# Patient Record
Sex: FEMALE | Race: WHITE | NOT HISPANIC OR LATINO | Employment: FULL TIME | URBAN - METROPOLITAN AREA
[De-identification: names, ages, dates, MRNs, and addresses within clinical notes are randomized per-mention and may not be internally consistent; named-entity substitution may affect disease eponyms.]

---

## 2018-02-23 ENCOUNTER — OFFICE VISIT (OUTPATIENT)
Dept: OBSTETRICS AND GYNECOLOGY | Facility: CLINIC | Age: 53
End: 2018-02-23

## 2018-02-23 VITALS — BODY MASS INDEX: 23.18 KG/M2 | WEIGHT: 157 LBS | DIASTOLIC BLOOD PRESSURE: 80 MMHG | SYSTOLIC BLOOD PRESSURE: 110 MMHG

## 2018-02-23 DIAGNOSIS — Z94.9 TRANSPLANT: ICD-10-CM

## 2018-02-23 DIAGNOSIS — Z11.3 SCREENING FOR STD (SEXUALLY TRANSMITTED DISEASE): ICD-10-CM

## 2018-02-23 DIAGNOSIS — C80.1 CANCER (HCC): ICD-10-CM

## 2018-02-23 DIAGNOSIS — Z87.410 HISTORY OF CERVICAL DYSPLASIA: ICD-10-CM

## 2018-02-23 DIAGNOSIS — C73 THYROID CANCER (HCC): ICD-10-CM

## 2018-02-23 DIAGNOSIS — N95.2 VAGINAL ATROPHY: ICD-10-CM

## 2018-02-23 DIAGNOSIS — Z00.00 ANNUAL PHYSICAL EXAM: Primary | ICD-10-CM

## 2018-02-23 PROBLEM — C50.919 BREAST CANCER (HCC): Status: ACTIVE | Noted: 2018-02-23

## 2018-02-23 LAB
BILIRUB BLD-MCNC: NEGATIVE MG/DL
CLARITY, POC: CLEAR
COLOR UR: YELLOW
GLUCOSE UR STRIP-MCNC: NEGATIVE MG/DL
KETONES UR QL: NEGATIVE
LEUKOCYTE EST, POC: NEGATIVE
NITRITE UR-MCNC: NEGATIVE MG/ML
PH UR: 5 [PH] (ref 5–8)
PROT UR STRIP-MCNC: NEGATIVE MG/DL
RBC # UR STRIP: NEGATIVE /UL
SP GR UR: 1 (ref 1–1.03)
UROBILINOGEN UR QL: NORMAL

## 2018-02-23 PROCEDURE — 99213 OFFICE O/P EST LOW 20 MIN: CPT | Performed by: OBSTETRICS & GYNECOLOGY

## 2018-02-23 PROCEDURE — 81002 URINALYSIS NONAUTO W/O SCOPE: CPT | Performed by: OBSTETRICS & GYNECOLOGY

## 2018-02-23 PROCEDURE — 99386 PREV VISIT NEW AGE 40-64: CPT | Performed by: OBSTETRICS & GYNECOLOGY

## 2018-02-23 RX ORDER — BACLOFEN 10 MG/1
10 TABLET ORAL 3 TIMES DAILY
COMMUNITY
End: 2018-08-16

## 2018-02-23 RX ORDER — LEVOTHYROXINE SODIUM 137 UG/1
112 TABLET ORAL DAILY
COMMUNITY

## 2018-02-23 RX ORDER — SUMATRIPTAN 100 MG/1
100 TABLET, FILM COATED ORAL
COMMUNITY
End: 2019-06-13 | Stop reason: SDUPTHER

## 2018-02-23 RX ORDER — HYDROCODONE BITARTRATE AND ACETAMINOPHEN 5; 325 MG/1; MG/1
1 TABLET ORAL EVERY 6 HOURS PRN
COMMUNITY
End: 2022-01-27

## 2018-02-23 RX ORDER — VENLAFAXINE 37.5 MG/1
37.5 TABLET ORAL 2 TIMES DAILY
COMMUNITY
End: 2018-08-16

## 2018-02-23 RX ORDER — VITAMIN B COMPLEX
2 CAPSULE ORAL DAILY
COMMUNITY

## 2018-02-23 RX ORDER — CEPHALEXIN 500 MG/1
500 CAPSULE ORAL 2 TIMES DAILY
COMMUNITY
End: 2018-04-27

## 2018-02-23 RX ORDER — ANASTROZOLE 1 MG/1
1 TABLET ORAL DAILY
COMMUNITY

## 2018-02-23 RX ORDER — PROMETHAZINE HYDROCHLORIDE 12.5 MG/1
12.5 TABLET ORAL EVERY 6 HOURS PRN
COMMUNITY
End: 2019-06-13 | Stop reason: SDUPTHER

## 2018-02-23 RX ORDER — ERGOCALCIFEROL (VITAMIN D2) 10 MCG
400 TABLET ORAL DAILY
COMMUNITY
End: 2019-06-13 | Stop reason: SDUPTHER

## 2018-02-23 RX ORDER — MULTIPLE VITAMINS W/ MINERALS TAB 9MG-400MCG
1 TAB ORAL DAILY
COMMUNITY
End: 2022-07-20

## 2018-02-23 RX ORDER — GABAPENTIN 300 MG/1
300 CAPSULE ORAL 3 TIMES DAILY
COMMUNITY
End: 2019-06-13 | Stop reason: SDUPTHER

## 2018-02-23 NOTE — PROGRESS NOTES
"GYN Annual Exam     CC- Here for annual exam.     Cinthya Ayoub is a 52 y.o. female new patient who presents for annual well woman exam. She has a very complicated past history . She has had Stage 3 breast cancer with a B mastectomy.She is using Estring for dryness on the advice of her oncologist despite the fact she was ER+. She is BRCA neg. She also had thyroid cancer. She has had a recent lymph node transplant at [facility] due to recurrent cellulitis. She is dong well. She has a h/o possible herpes in college but has never had an outbreak. She does feel like she sometimes has a\"scratch\" vaginally that is very sore, story concerning for HSV.  She had a TLH/USO for recurrent dysplasia.       OB History      Para Term  AB Living    0 0 0 0 0 0    SAB TAB Ectopic Multiple Live Births    0 0 0 0 0          Menarche:13  Menopause:48- TLH/USO dysplasia  HRT:none, Estring  Current contraception: status post hysterectomy  History of abnormal Pap smear: yes - s/p LEEP, recurrent. TLH  History of abnormal mammogram: yes - stage 3 breast cancer  Family history of uterine, colon or ovarian cancer: yes - colon cancer dad age 74  Family history of breast cancer: yes - m greast grandmom   STD's: ? HSV2    Health Maintenance   Topic Date Due   • TDAP/TD VACCINES (1 - Tdap) 1984   • INFLUENZA VACCINE  2017   • HEPATITIS C SCREENING  2018   • MAMMOGRAM  2018   • PAP SMEAR  2018   • COLONOSCOPY  2018       Past Medical History:   Diagnosis Date   • Abnormal Pap smear of cervix    • Breast cancer metastasized to axillary lymph node, right 2014   • Cervical dysplasia     S/P LEEP, continued abnl, TLH   • Herpes    • Thyroid cancer        Past Surgical History:   Procedure Laterality Date   • BREAST BIOPSY     • CERVICAL BIOPSY  W/ LOOP ELECTRODE EXCISION     • HYSTERECTOMY      TLH/USO dysplasia   • HYSTEROSCOPY     • MASTECTOMY      B mastectomy with TRAM flap " reconstruction   • OTHER SURGICAL HISTORY      lymph node transplant   • TOTAL THYROIDECTOMY      follicular cancer         Current Outpatient Prescriptions:   •  anastrozole (ARIMIDEX) 1 MG tablet, Take 1 mg by mouth Daily., Disp: , Rfl:   •  B Complex Vitamins (VITAMIN B COMPLEX) capsule capsule, Take  by mouth Daily., Disp: , Rfl:   •  baclofen (LIORESAL) 10 MG tablet, Take 10 mg by mouth 3 (Three) Times a Day., Disp: , Rfl:   •  cephalexin (KEFLEX) 500 MG capsule, Take 500 mg by mouth 2 (Two) Times a Day., Disp: , Rfl:   •  gabapentin (NEURONTIN) 300 MG capsule, Take 300 mg by mouth 3 (Three) Times a Day., Disp: , Rfl:   •  HYDROcodone-acetaminophen (NORCO) 5-325 MG per tablet, Take 1 tablet by mouth Every 6 (Six) Hours As Needed., Disp: , Rfl:   •  levothyroxine (SYNTHROID, LEVOTHROID) 137 MCG tablet, Take 137 mcg by mouth Daily., Disp: , Rfl:   •  Multiple Vitamins-Minerals (MULTIVITAMIN WITH MINERALS) tablet tablet, Take 1 tablet by mouth Daily., Disp: , Rfl:   •  promethazine (PHENERGAN) 12.5 MG tablet, Take 12.5 mg by mouth Every 6 (Six) Hours As Needed for Nausea or Vomiting., Disp: , Rfl:   •  SUMAtriptan (IMITREX) 100 MG tablet, Take 100 mg by mouth Every 2 (Two) Hours As Needed for Migraine., Disp: , Rfl:   •  venlafaxine (EFFEXOR) 37.5 MG tablet, Take 37.5 mg by mouth 2 (Two) Times a Day., Disp: , Rfl:   •  Vitamin D, Cholecalciferol, (CHOLECALCIFEROL) 400 units tablet, Take 400 Units by mouth Daily., Disp: , Rfl:     Allergies   Allergen Reactions   • Codeine Nausea And Vomiting   • Levaquin [Levofloxacin In D5w] Unknown (See Comments)     Lost concious       Social History   Substance Use Topics   • Smoking status: Never Smoker   • Smokeless tobacco: None   • Alcohol use Yes      Comment: socially       Family History   Problem Relation Age of Onset   • Colon cancer Father 74   • Breast cancer Neg Hx      maternal great grandmother breast   • Ovarian cancer Neg Hx        Review of Systems    Constitutional: Negative for appetite change, fatigue, fever and unexpected weight change.   Eyes: Negative for photophobia and visual disturbance.   Respiratory: Negative for cough and shortness of breath.    Cardiovascular: Negative for chest pain and palpitations.   Gastrointestinal: Negative for abdominal distention, abdominal pain, constipation, diarrhea and nausea.   Endocrine: Negative for cold intolerance and heat intolerance.   Genitourinary: Positive for genital sores (occassionally). Negative for dyspareunia, dysuria, menstrual problem, pelvic pain and vaginal discharge.   Musculoskeletal: Negative for arthralgias and back pain.   Skin: Negative for color change and rash.   Allergic/Immunologic: Negative for environmental allergies and food allergies.   Neurological: Negative for headaches.   Hematological: Negative for adenopathy. Does not bruise/bleed easily.   Psychiatric/Behavioral: Negative for dysphoric mood. The patient is not nervous/anxious.        /80  Wt 71.2 kg (157 lb)  Breastfeeding? No  BMI 23.18 kg/m2    Physical Exam   Constitutional: She is oriented to person, place, and time. She appears well-developed and well-nourished.   HENT:   Head: Normocephalic and atraumatic.   Eyes: Conjunctivae are normal. No scleral icterus.   Neck: Neck supple. No thyromegaly present.   Cardiovascular: Normal rate and regular rhythm.    Pulmonary/Chest: Effort normal and breath sounds normal. Right breast exhibits no inverted nipple, no mass, no nipple discharge, no skin change and no tenderness. Left breast exhibits no inverted nipple, no mass, no nipple discharge, no skin change and no tenderness.   B Mastectomy with reconstruction   Abdominal: Soft. Bowel sounds are normal. She exhibits no distension and no mass. There is no tenderness. There is no rebound and no guarding. No hernia.   Genitourinary:       Pelvic exam was performed with patient supine. There is no rash, tenderness or lesion on  the right labia. There is no rash, tenderness or lesion on the left labia. Right adnexum displays no mass, no tenderness and no fullness. Left adnexum displays no mass, no tenderness and no fullness. No erythema, tenderness or bleeding in the vagina. No foreign body in the vagina. No signs of injury around the vagina. No vaginal discharge found.   Genitourinary Comments: Normal cuff  No masses   Musculoskeletal: Normal range of motion.   Neurological: She is alert and oriented to person, place, and time.   Skin: Skin is warm and dry.   Psychiatric: She has a normal mood and affect. Her behavior is normal. Judgment and thought content normal.   Nursing note and vitals reviewed.         Assessment/Plan    1) GYN HM: check pap/HPV   SBE demonstrated and encouraged.  2) STD screening: accepts.  Condoms encouraged.  3) Bone health - Weight bearing exercise, dietary calcium recommendations and vitamin D reviewed.   4) Diet and Exercise discussed  5) Smoking Status: non smoker  6) Social:  for [facility], not .   7)MMG:  N/A, s/p mastectomy. BRCA neg.   8) DEXA-plan age 55.   9)C scope- refer.  10) Possible HSV- will check bloodwork. Rec pt RTO when she is having the lesion so it can be swabbed. If she is HSV +, consider suppressive therapy to decrease outbreaks and to protect discordant partners.   11) Estring use- Patient counseled that vaginal estrogen rings, creams and tablets are available and highly effective at treating local vaginal symptoms such as atrophy and vaginal dryness.  Vaginal estrogen does not cause uterine overgrowth and does NOT require a progestogen to protect the uterus.  Very small amounts of estrogen are absorbed systemically.  For patients with a history of an estrogen dependent cancer such as breast cancer, the decision to use local estrogen for local vaginal symptoms should be made after consultation with her oncologist.  Possible side effects include local  irritation or burning and/or vaginal bleeding and should always be reported.    12) Pt has had 2 cancers at a young age- offered referral to , pt agrees, Refer [facility].    Follow up prn and 1 year       Virginia was seen today for gynecologic exam.    Diagnoses and all orders for this visit:    Annual physical exam  -     POC Urinalysis Dipstick  -     Cancel: Pap IG, HPV-hr  -     Hepatitis B Surface Antigen  -     Hepatitis C Antibody  -     HIV-1 / O / 2 Ag / Antibody 4th Generation  -     HSV 1 & 2 - Specific Antibody, IgG  -     RPR  -     PapIG, CtNgTv, HPV, Rfx 16 / 18  -     Ambulatory Referral For Screening Colonoscopy    Cancer  -     Ambulatory Referral For Screening Colonoscopy  -     Ambulatory Referral to Genetics    Transplant    Thyroid cancer    History of cervical dysplasia    Vaginal atrophy    Screening for STD (sexually transmitted disease)        Edelmira Aviles MD  2/23/2018  12:46 PM

## 2018-02-26 LAB
HBV SURFACE AG SERPL QL IA: NEGATIVE
HCV AB S/CO SERPL IA: <0.1 S/CO RATIO (ref 0–0.9)
HIV 1+2 AB+HIV1 P24 AG SERPL QL IA: NON REACTIVE
HSV1 IGG SER IA-ACNC: 16.7 INDEX (ref 0–0.9)
HSV2 IGG SER IA-ACNC: <0.91 INDEX (ref 0–0.9)
RPR SER QL: NORMAL

## 2018-03-01 LAB
C TRACH RRNA CVX QL NAA+PROBE: NEGATIVE
CYTOLOGIST CVX/VAG CYTO: NORMAL
CYTOLOGY CVX/VAG DOC THIN PREP: NORMAL
DX ICD CODE: NORMAL
HIV 1 & 2 AB SER-IMP: NORMAL
HPV I/H RISK 1 DNA CVX QL PROBE+SIG AMP: NEGATIVE
Lab: NORMAL
N GONORRHOEA RRNA CVX QL NAA+PROBE: NEGATIVE
OTHER STN SPEC: NORMAL
PATH REPORT.FINAL DX SPEC: NORMAL
STAT OF ADQ CVX/VAG CYTO-IMP: NORMAL
T VAGINALIS RRNA SPEC QL NAA+PROBE: NEGATIVE

## 2018-03-27 NOTE — PROGRESS NOTES
Subjective   Patient ID: Cinthya Ayoub is a 52 y.o. female is being seen for consultation today at the request of Facundo Wing II, MD for back pain.    Today the patient reports back pain that radiates intermittently down the legs right worse than left. She also complains of tingling in the legs. She also states that she has been falling down a lot.    Back Pain   This is a chronic problem. The current episode started more than 1 year ago. The problem has been gradually worsening since onset. The pain is present in the lumbar spine. Associated symptoms include headaches, leg pain, numbness, tingling and weakness. Pertinent negatives include no bladder incontinence.       The following portions of the patient's history were reviewed and updated as appropriate: allergies, current medications, past family history, past medical history, past social history, past surgical history and problem list.    Review of Systems   Eyes: Positive for photophobia.   Genitourinary: Negative for bladder incontinence.   Musculoskeletal: Positive for arthralgias, back pain, gait problem, myalgias and neck pain.   Allergic/Immunologic: Positive for immunocompromised state.   Neurological: Positive for tingling, weakness, numbness and headaches.   Hematological: Bruises/bleeds easily.   All other systems reviewed and are negative.    The patient is a practicing  and is quite active. She has a history of breast cancer and thyroid cancer, which have been successfully treated at this point. She has a history of migraines and is on gabapentin at 300/300/600. She also has a history of many years of low back pain and radiating buttock and leg pain. These have gotten worse over the last 6 months. Her neurologist, Dr. Wing, sent her here. She has gone through physical therapy which really has not helped. She has no motor deficits but certainly does have a history consistent with neurogenic claudication with positive shopping cart  sign. We discussed her MRI which shows multiple things. She also has some thoracolumbar scoliosis but the main thing is the spinal stenosis at L4-L5 and the spondylolisthesis. She has not had epidural blocks and I told her I thought that was a good starting point, but it may or may not be helpful. If it is, we can utilize it, and if not and her pain is intractable, we might consider a lumbar decompression and fusion of L4-L5. Again, it is both legs, right worse than left, as well as the low back pain.      Objective   Physical Exam   Constitutional: She is oriented to person, place, and time. She appears well-developed and well-nourished.   HENT:   Head: Normocephalic and atraumatic.   Eyes: Conjunctivae and EOM are normal. Pupils are equal, round, and reactive to light.   Fundoscopic exam:       The right eye shows no papilledema. The right eye shows venous pulsations.        The left eye shows no papilledema. The left eye shows venous pulsations.   Neck: Carotid bruit is not present.   Neurological: She is oriented to person, place, and time. She has a normal Finger-Nose-Finger Test and a normal Heel to Shin Test. Gait normal.   Reflex Scores:       Tricep reflexes are 2+ on the right side and 2+ on the left side.       Bicep reflexes are 2+ on the right side and 2+ on the left side.       Brachioradialis reflexes are 2+ on the right side and 2+ on the left side.       Patellar reflexes are 2+ on the right side and 2+ on the left side.       Achilles reflexes are 2+ on the right side and 2+ on the left side.  Psychiatric: Her speech is normal.     Neurologic Exam     Mental Status   Oriented to person, place, and time.   Registration of memory: Good recent and remote memory.   Attention: normal. Concentration: normal.   Speech: speech is normal   Level of consciousness: alert  Knowledge: consistent with education.     Cranial Nerves     CN II   Visual fields full to confrontation.   Visual acuity: normal    CN  III, IV, VI   Pupils are equal, round, and reactive to light.  Extraocular motions are normal.     CN V   Facial sensation intact.   Right corneal reflex: normal  Left corneal reflex: normal    CN VII   Facial expression full, symmetric.   Right facial weakness: none  Left facial weakness: none    CN VIII   Hearing: intact    CN IX, X   Palate: symmetric    CN XI   Right sternocleidomastoid strength: normal  Left sternocleidomastoid strength: normal    CN XII   Tongue: not atrophic  Tongue deviation: none    Motor Exam   Muscle bulk: normal  Right arm tone: normal  Left arm tone: normal  Right leg tone: normal  Left leg tone: normal    Strength   Strength 5/5 except as noted.     Sensory Exam   Light touch normal.     Gait, Coordination, and Reflexes     Gait  Gait: normal    Coordination   Finger to nose coordination: normal  Heel to shin coordination: normal    Reflexes   Right brachioradialis: 2+  Left brachioradialis: 2+  Right biceps: 2+  Left biceps: 2+  Right triceps: 2+  Left triceps: 2+  Right patellar: 2+  Left patellar: 2+  Right achilles: 2+  Left achilles: 2+  Right : 2+  Left : 2+      Assessment/Plan   Independent Review of Radiographic Studies:    Lumbar MRI done 10/24/17 shows multiple levels of degenerative disc disease throughout the lumbar spine.  The most significant factor is the spinal stenosis at L4-L5 with a spondylolisthesis causing severe bilateral root compression.  Agree with the report.      Medical Decision Making:    We'll try to avoid surgery if we can although it may come to that.  We'll try some epidural blocks and have her come back to see me in 2 months.  If nothing else helps, we might need to resort to a decompression and fusion at L4-L5.    Virginia was seen today for back pain.    Diagnoses and all orders for this visit:    Spinal stenosis of lumbar region with neurogenic claudication  -     Epidural Block    Spondylolisthesis at L4-L5 level  -     Epidural  Block    Other forms of scoliosis, thoracic region      Return in about 2 months (around 5/28/2018).

## 2018-03-28 ENCOUNTER — TELEPHONE (OUTPATIENT)
Dept: GASTROENTEROLOGY | Facility: CLINIC | Age: 53
End: 2018-03-28

## 2018-03-28 ENCOUNTER — OFFICE VISIT (OUTPATIENT)
Dept: NEUROSURGERY | Facility: CLINIC | Age: 53
End: 2018-03-28

## 2018-03-28 VITALS
WEIGHT: 157 LBS | SYSTOLIC BLOOD PRESSURE: 128 MMHG | HEIGHT: 69 IN | BODY MASS INDEX: 23.25 KG/M2 | HEART RATE: 78 BPM | DIASTOLIC BLOOD PRESSURE: 86 MMHG

## 2018-03-28 DIAGNOSIS — M43.16 SPONDYLOLISTHESIS AT L4-L5 LEVEL: ICD-10-CM

## 2018-03-28 DIAGNOSIS — M41.84 OTHER FORMS OF SCOLIOSIS, THORACIC REGION: ICD-10-CM

## 2018-03-28 DIAGNOSIS — Z12.11 COLON CANCER SCREENING: Primary | ICD-10-CM

## 2018-03-28 DIAGNOSIS — M48.062 SPINAL STENOSIS OF LUMBAR REGION WITH NEUROGENIC CLAUDICATION: Primary | ICD-10-CM

## 2018-03-28 PROCEDURE — 99244 OFF/OP CNSLTJ NEW/EST MOD 40: CPT | Performed by: NEUROLOGICAL SURGERY

## 2018-04-11 PROBLEM — Z12.11 COLON CANCER SCREENING: Status: ACTIVE | Noted: 2018-04-11

## 2018-04-11 RX ORDER — SODIUM PICOSULFATE, MAGNESIUM OXIDE, AND ANHYDROUS CITRIC ACID 10; 3.5; 12 MG/160ML; G/160ML; G/160ML
1 LIQUID ORAL 2 TIMES DAILY
Qty: 320 ML | Refills: 0 | Status: SHIPPED | OUTPATIENT
Start: 2018-04-11 | End: 2018-04-12 | Stop reason: CLARIF

## 2018-04-11 NOTE — PATIENT INSTRUCTIONS
Dr. Danielle Morelos   Date: _____6/1/18________     Arrival Time: __8:00am_______    Hospital:  [facility] ([address] [place] [address] [place] [address]) (back of hospital at the emergency room) or         Please call the office as soon as possible if you need to reschedule or cancel your procedure please give two weeks’ notice.  If you do not show up or frequently reschedule your procedure, your provider has the option of not rescheduling.    Stop the following medications 5 days prior to your procedure- check with the prescribing doctor prior to holding.  No Aspirin, Advil, Aleve, Motrin, IBU or anything listed on the back of this form.    If you are taking any medications on the attached list and/or pills that contain iron please stop them one week prior. Insulin will be addressed separately.    1.  your prescription AND a 10 oz. bottle of Magnesium Citrate (over the counter) as soon as possible. Do not wait until the day before in case of issues with cost or etc.    The day before your procedure  It is very important that you follow the instructions on this form and not on the prep you were prescribed.    You will be on a clear liquid diet only which may include coffee , tea, soft drinks, broth(chicken beef or vegetable), popsicles, Jell-O, Gatorade, juice (no orange, grapefruit or V-8).  ®No red or purple dyes and no dairy or non-dairy.    2. At 8:00 am drink the bottle of magnesium citrate.  Drink plenty of fluids in between    3. At    2:00 pm drink first dose or ½ of prep, mix as directed on container/box    Drink plenty of fluids between    4. At   10:00 pm      drink second dose or ½ of prep, mix as directed on container/box    5. If you start to get nauseous while drinking your prep try stepping away for 15-20 min. then resume again at a slower pace.    You may have clear liquids only up to 4 hours before your procedure.  No gum or candy!     You may only take your morning prescription blood  pressure (no diuretic combinations), breathing, seizure, psych or heart medications.     You will need a  to accompany you for your exam. The average time spent in our facility is around 2-3 hours.  You are not to drive, operate machinery or make legal decisions the remainder of the day following you procedure.    Please call Tania the morning of your procedure@ [phone] if your bowel movements are not a clearish liquid or have questions about any of this information.  If it is after hours or the weekend and you need to reach the doctor or have questions for the office please call [phone].     It is your responsibility to check with your insurance company to determine benefits and out of pocket costs.      Avoid these medications 5-7 days prior to surgery  Please check with your prescribing doctor before stopping any medications  NSAIDS- Advil, Aleve, Motrin, Ibuprofen, Midol, Excedrin, Fiorinal, Caryl-[place]  (Some cold medications may have these in them)    All herbal medications- iron, vitamin E, fish oil, decongestants (phenylephrine, pseudoephedrine), ginkgo, garlic, ginseng, [place]’s wart    Mobic (meloxicam), Celebrex, Diclofenac (Voltaren), Nambumetone (Relafen), Daypro, Naproxen, Sulindac, Indomethacin, Toradol, Feldine, Salsalate, Etodolac (Lodine),     Viagra, Cialis, Levitra    Aspirin, Plavix (clopidogrel), Effient, Pletal, Coumadin, Pradaxa, Brilinta, Ticlide, Eliquis, (Xaralto- 3 days)      Diet pills -Adipex (phentermine) -2 weeks prior

## 2018-04-12 RX ORDER — SODIUM, POTASSIUM,MAG SULFATES 17.5-3.13G
1 SOLUTION, RECONSTITUTED, ORAL ORAL EVERY 12 HOURS
Qty: 2 BOTTLE | Refills: 0 | Status: ON HOLD | OUTPATIENT
Start: 2018-04-12 | End: 2018-08-17

## 2018-04-13 ENCOUNTER — ANESTHESIA (OUTPATIENT)
Dept: PAIN MEDICINE | Facility: HOSPITAL | Age: 53
End: 2018-04-13

## 2018-04-13 ENCOUNTER — ANESTHESIA EVENT (OUTPATIENT)
Dept: PAIN MEDICINE | Facility: HOSPITAL | Age: 53
End: 2018-04-13

## 2018-04-13 ENCOUNTER — HOSPITAL ENCOUNTER (OUTPATIENT)
Dept: GENERAL RADIOLOGY | Facility: HOSPITAL | Age: 53
Discharge: HOME | End: 2018-04-13

## 2018-04-13 ENCOUNTER — TRANSCRIBE ORDERS (OUTPATIENT)
Dept: PAIN MEDICINE | Facility: HOSPITAL | Age: 53
End: 2018-04-13

## 2018-04-13 ENCOUNTER — HOSPITAL ENCOUNTER (OUTPATIENT)
Dept: PAIN MEDICINE | Facility: HOSPITAL | Age: 53
Discharge: HOME | End: 2018-04-13
Attending: NEUROLOGICAL SURGERY | Admitting: NEUROLOGICAL SURGERY

## 2018-04-13 VITALS
SYSTOLIC BLOOD PRESSURE: 125 MMHG | HEART RATE: 84 BPM | RESPIRATION RATE: 16 BRPM | OXYGEN SATURATION: 97 % | TEMPERATURE: 98.9 F | DIASTOLIC BLOOD PRESSURE: 90 MMHG

## 2018-04-13 DIAGNOSIS — M48.062 SPINAL STENOSIS OF LUMBAR REGION WITH NEUROGENIC CLAUDICATION: ICD-10-CM

## 2018-04-13 DIAGNOSIS — M43.16 SPONDYLOLISTHESIS AT L4-L5 LEVEL: ICD-10-CM

## 2018-04-13 DIAGNOSIS — R52 PAIN: ICD-10-CM

## 2018-04-13 DIAGNOSIS — M48.062 SPINAL STENOSIS OF LUMBAR REGION WITH NEUROGENIC CLAUDICATION: Primary | ICD-10-CM

## 2018-04-13 PROCEDURE — C1755 CATHETER, INTRASPINAL: HCPCS

## 2018-04-13 PROCEDURE — 25010000002 METHYLPREDNISOLONE PER 80 MG: Performed by: ANESTHESIOLOGY

## 2018-04-13 PROCEDURE — 77003 FLUOROGUIDE FOR SPINE INJECT: CPT

## 2018-04-13 PROCEDURE — 0 IOPAMIDOL 41 % SOLUTION: Performed by: ANESTHESIOLOGY

## 2018-04-13 RX ORDER — LIDOCAINE HYDROCHLORIDE 10 MG/ML
1 INJECTION, SOLUTION INFILTRATION; PERINEURAL ONCE AS NEEDED
Status: DISCONTINUED | OUTPATIENT
Start: 2018-04-13 | End: 2018-04-14 | Stop reason: HOSPADM

## 2018-04-13 RX ORDER — METHYLPREDNISOLONE ACETATE 80 MG/ML
80 INJECTION, SUSPENSION INTRA-ARTICULAR; INTRALESIONAL; INTRAMUSCULAR; SOFT TISSUE ONCE
Status: COMPLETED | OUTPATIENT
Start: 2018-04-13 | End: 2018-04-13

## 2018-04-13 RX ORDER — MIDAZOLAM HYDROCHLORIDE 1 MG/ML
1 INJECTION INTRAMUSCULAR; INTRAVENOUS AS NEEDED
Status: DISCONTINUED | OUTPATIENT
Start: 2018-04-13 | End: 2018-04-14 | Stop reason: HOSPADM

## 2018-04-13 RX ORDER — SODIUM CHLORIDE 0.9 % (FLUSH) 0.9 %
1-10 SYRINGE (ML) INJECTION AS NEEDED
Status: DISCONTINUED | OUTPATIENT
Start: 2018-04-13 | End: 2018-04-14 | Stop reason: HOSPADM

## 2018-04-13 RX ADMIN — METHYLPREDNISOLONE ACETATE 80 MG: 80 INJECTION, SUSPENSION INTRA-ARTICULAR; INTRALESIONAL; INTRAMUSCULAR; SOFT TISSUE at 13:45

## 2018-04-13 RX ADMIN — IOPAMIDOL 10 ML: 408 INJECTION, SOLUTION INTRATHECAL at 13:45

## 2018-04-13 NOTE — H&P
[facility]    History and Physical    Patient Name: Cinthya Ayoub  :  1965  MRN:  [id number]  Date of Admission: 2018    Subjective     Patient is a 52 y.o. female presents with chief complaint of chronic, intermitent, moderate, severe low back and bilateral lower extremity pain.  Onset of symptoms was gradual starting several years ago.  Symptoms are associated/aggravated by activity. Symptoms improve with pain medication and rest.  On a pain scale from 0-10, she rates her pain as a 4 on a good day and 8 on a bad day.  She describes the pain as aching and burning in nature.  She says the pain is adversely affected her ability to perform activities of daily living, her ability to exercise and her sleep habits.  The pain radiates down the posterior aspect of both lower extremities, the right being greater than the left.    The following portions of the patients history were reviewed and updated as appropriate: current medications, allergies, past medical history, past surgical history, past family history, past social history and problem list                Objective     Past Medical History:   Past Medical History:   Diagnosis Date   • Abnormal Pap smear of cervix    • Breast cancer metastasized to axillary lymph node, right 2014   • Cervical dysplasia     S/P LEEP, continued abnl, TLH   • Herpes    • Low back pain    • Thyroid cancer      Past Surgical History:   Past Surgical History:   Procedure Laterality Date   • BREAST BIOPSY     • CERVICAL BIOPSY  W/ LOOP ELECTRODE EXCISION     • HYSTERECTOMY      TLH/USO dysplasia   • HYSTEROSCOPY     • KNEE ARTHROSCOPY Left    • MASTECTOMY      B mastectomy with TRAM flap reconstruction   • OTHER SURGICAL HISTORY      lymph node transplant   • TOTAL THYROIDECTOMY      follicular cancer     Family History:   Family History   Problem Relation Age of Onset   • Colon cancer Father 74   • No Known Problems Mother    • No Known Problems Brother     • Breast cancer Neg Hx      maternal great grandmother breast   • Ovarian cancer Neg Hx      Social History:   Social History   Substance Use Topics   • Smoking status: Never Smoker   • Smokeless tobacco: Never Used   • Alcohol use Yes      Comment: socially       Vital Signs Range for the last 24 hours  Temperature: Temp:  [37.2 °C (98.9 °F)] 37.2 °C (98.9 °F)   Temp Source: Temp src: Oral   BP: BP: (117)/(84) 117/84   Pulse: Heart Rate:  [81] 81   Respirations: Resp:  [16] 16   SPO2: SpO2:  [97 %] 97 %   O2 Amount (l/min):     O2 Devices Device (Oxygen Therapy): room air   Weight:           --------------------------------------------------------------------------------    Current Outpatient Prescriptions   Medication Sig Dispense Refill   • anastrozole (ARIMIDEX) 1 MG tablet Take 1 mg by mouth Daily.     • B Complex Vitamins (VITAMIN B COMPLEX) capsule capsule Take  by mouth Daily.     • baclofen (LIORESAL) 10 MG tablet Take 10 mg by mouth 3 (Three) Times a Day.     • cephalexin (KEFLEX) 500 MG capsule Take 500 mg by mouth 2 (Two) Times a Day.     • gabapentin (NEURONTIN) 300 MG capsule Take 300 mg by mouth 3 (Three) Times a Day.     • HYDROcodone-acetaminophen (NORCO) 5-325 MG per tablet Take 1 tablet by mouth Every 6 (Six) Hours As Needed.     • levothyroxine (SYNTHROID, LEVOTHROID) 137 MCG tablet Take 137 mcg by mouth Daily.     • Multiple Vitamins-Minerals (MULTIVITAMIN WITH MINERALS) tablet tablet Take 1 tablet by mouth Daily.     • promethazine (PHENERGAN) 12.5 MG tablet Take 12.5 mg by mouth Every 6 (Six) Hours As Needed for Nausea or Vomiting.     • sodium-potassium-magnesium sulfates (SUPREP BOWEL PREP KIT) 17.5-3.13-1.6 GM/180ML solution oral solution Take 1 bottle by mouth Every 12 (Twelve) Hours. 2 bottle 0   • SUMAtriptan (IMITREX) 100 MG tablet Take 100 mg by mouth Every 2 (Two) Hours As Needed for Migraine.     • venlafaxine (EFFEXOR) 37.5 MG tablet Take 37.5 mg by mouth 2 (Two) Times a Day.      • Vitamin D, Cholecalciferol, (CHOLECALCIFEROL) 400 units tablet Take 400 Units by mouth Daily.       No current facility-administered medications for this encounter.        --------------------------------------------------------------------------------  Assessment/Plan      Anesthesia Evaluation     Patient summary reviewed and Nursing notes reviewed   NPO Solid Status: > 8 hours  NPO Liquid Status: < 2 hours           Airway   Mallampati: II  TM distance: >3 FB  Neck ROM: full  Dental - normal exam     Pulmonary - negative pulmonary ROS and normal exam    breath sounds clear to auscultation  Cardiovascular - negative cardio ROS and normal exam    Rhythm: regular  Rate: normal    (-) angina, orthopnea, PND, BENITO      Neuro/Psych- negative ROS  (-) left straight leg raise test, right straight leg raise test  GI/Hepatic/Renal/Endo - negative ROS     Musculoskeletal (-) negative ROS    Abdominal  - normal exam    Abdomen: soft.  Bowel sounds: normal.   Substance History - negative use     OB/GYN negative ob/gyn ROS         Other      history of cancer (Breast cancer and thyroid cancer)        Phys Exam Other:   NECK:  Supple without adenopathy, JVD or bruits.    EXTREMITIES:  There is no clubbing, cyanosis or edema.  Radial pulses are 1+ and equal bilaterally.  Examination of the lumbar spine shows no marked tenderness or discoloration.    SKIN:  Warm and dry.    NEUROLOGICAL EXAM:  Alert and oriented ×3.  Extraocular muscles intact.  Strength is normal and symmetrical in the lower extremities with respect to dorsal and plantar flexion of the ankles and quadriceps.           Diagnosis and Plan    Treatment Plan  ASA 2      Procedures: Lumbar Epidural Steroid Injection(LESI), With fluoroscopy,       Anesthetic plan and risks discussed with patient.          Diagnosis     * Lumbar radiculopathy [M54.16]     * Lumbago [M54.5]      * Spinal stenosis, lumbar region without neurogenic claudication [M48.061]

## 2018-04-13 NOTE — ANESTHESIA PROCEDURE NOTES
PAIN Epidural block    Patient location during procedure: pain clinic  Start Time: 4/13/2018 1:34 PM  Stop Time: 4/13/2018 1:48 PM  Indication:procedure for pain  Performed By  Anesthesiologist: DWIGHT SPIVEY  Preanesthetic Checklist  Completed: patient identified, site marked, surgical consent, pre-op evaluation, timeout performed, risks and benefits discussed and monitors and equipment checked  Additional Notes  Interlaminar epidural was performed under fluoroscopic guidance.    Diagnosis     * Lumbar radiculopathy (M54.16)     * Lumbago (M54.5)      * Spinal stenosis, lumbar region without neurogenic claudication (M48.061)  Prep:  Pt Position:prone  Sterile Tech:cap, gloves, mask and sterile barrier  Prep:chlorhexidine gluconate and isopropyl alcohol  Monitoring:blood pressure monitoring, continuous pulse oximetry and EKG  Procedure:  Sedation: no   Approach:left paramedian  Guidance: fluoroscopy  Location:lumbar  Level:4-5  Needle Type:Tuohy  Needle Gauge:20 G  Aspiration:negative  Medications:  Depomedrol:80 mg  Preservative Free Saline:3mL  Isovue:2mL  Comments:Epidural spread of contrast.  Post Assessment:  Dressing:occlusive dressing applied  Pt Tolerance:patient tolerated the procedure well with no apparent complications  Complications:no

## 2018-04-27 ENCOUNTER — HOSPITAL ENCOUNTER (OUTPATIENT)
Dept: PAIN MEDICINE | Facility: HOSPITAL | Age: 53
Discharge: HOME | End: 2018-04-27
Admitting: NEUROLOGICAL SURGERY

## 2018-04-27 ENCOUNTER — ANESTHESIA EVENT (OUTPATIENT)
Dept: PAIN MEDICINE | Facility: HOSPITAL | Age: 53
End: 2018-04-27

## 2018-04-27 ENCOUNTER — ANESTHESIA (OUTPATIENT)
Dept: PAIN MEDICINE | Facility: HOSPITAL | Age: 53
End: 2018-04-27

## 2018-04-27 ENCOUNTER — HOSPITAL ENCOUNTER (OUTPATIENT)
Dept: GENERAL RADIOLOGY | Facility: HOSPITAL | Age: 53
Discharge: HOME | End: 2018-04-27

## 2018-04-27 VITALS
DIASTOLIC BLOOD PRESSURE: 81 MMHG | SYSTOLIC BLOOD PRESSURE: 128 MMHG | TEMPERATURE: 98.3 F | RESPIRATION RATE: 16 BRPM | OXYGEN SATURATION: 97 % | HEART RATE: 83 BPM

## 2018-04-27 DIAGNOSIS — R52 PAIN: ICD-10-CM

## 2018-04-27 PROCEDURE — C1755 CATHETER, INTRASPINAL: HCPCS

## 2018-04-27 PROCEDURE — 77003 FLUOROGUIDE FOR SPINE INJECT: CPT

## 2018-04-27 PROCEDURE — 25010000002 METHYLPREDNISOLONE PER 80 MG: Performed by: ANESTHESIOLOGY

## 2018-04-27 RX ORDER — SODIUM CHLORIDE 0.9 % (FLUSH) 0.9 %
1-10 SYRINGE (ML) INJECTION AS NEEDED
Status: DISCONTINUED | OUTPATIENT
Start: 2018-04-27 | End: 2018-04-28 | Stop reason: HOSPADM

## 2018-04-27 RX ORDER — LIDOCAINE HYDROCHLORIDE 10 MG/ML
1 INJECTION, SOLUTION INFILTRATION; PERINEURAL ONCE
Status: DISCONTINUED | OUTPATIENT
Start: 2018-04-27 | End: 2018-04-28 | Stop reason: HOSPADM

## 2018-04-27 RX ORDER — FENTANYL CITRATE 50 UG/ML
50 INJECTION, SOLUTION INTRAMUSCULAR; INTRAVENOUS
Status: DISCONTINUED | OUTPATIENT
Start: 2018-04-27 | End: 2018-04-28 | Stop reason: HOSPADM

## 2018-04-27 RX ORDER — MIDAZOLAM HYDROCHLORIDE 1 MG/ML
1 INJECTION INTRAMUSCULAR; INTRAVENOUS
Status: DISCONTINUED | OUTPATIENT
Start: 2018-04-27 | End: 2018-04-28 | Stop reason: HOSPADM

## 2018-04-27 RX ORDER — METHYLPREDNISOLONE ACETATE 80 MG/ML
80 INJECTION, SUSPENSION INTRA-ARTICULAR; INTRALESIONAL; INTRAMUSCULAR; SOFT TISSUE ONCE
Status: COMPLETED | OUTPATIENT
Start: 2018-04-27 | End: 2018-04-27

## 2018-04-27 RX ADMIN — METHYLPREDNISOLONE ACETATE 80 MG: 80 INJECTION, SUSPENSION INTRA-ARTICULAR; INTRALESIONAL; INTRAMUSCULAR; SOFT TISSUE at 07:57

## 2018-04-27 NOTE — H&P
INTERVAL HISTORY:    The patient returns for another Lumbar epidural steroid injection 2 today.  They have received 50% improvement since their last injection with a pain level of 3 /10 at its worst recently.  Leg pain is gone, back still hurts.  Conservative measures tried in the interim. Daily activities are still affected by the pain.    Radiology reports and/or previous notes have been reviewed and are consistent with their diagnosis of Post-Op Diagnosis Codes:     * Lumbar spinal stenosis [M48.061]     * Lumbar radiculopathy [M54.16]     * Lumbago [M54.5]    Alert and oriented  MP - 2  Lungs - clear  CV - rrr    Diagnosis:    Plan:  Lumbar epidural steroid injection under fluoroscopic guidance    I have encouraged them to continue:  1.  Physical therapy exercises at home as prescribed by physical therapy or from the pain clinic handout (given to the patient).  Continuation of these exercises every day, or multiple times per week, even when the patient has good pain relief, was stressed to the patient as a preventative measure to decrease the frequency and severity of future pain episodes.  2.  Continue pain medicines as already prescribed.  If patient not currently taking any, it is recommended to begin Acetaminophen 1000 mg po q 8 hours.  If other medicines containing Acetaminophen are currently prescribed, maintain daily dose at 3000mg.    3.  If they can tolerate NSAIDS, it is recommended to take Ibuprofen 600 mg po q 6 hours for 7 days during pain exacerbations.   Alternatively, they may substitute an NSAID of their choice (e.g. Aleve)  4.  Heat and ice to the affected area as tolerated for pain control.  It was discussed that heating pads can cause burns.  5.  Low impact exercise such as walking or water exercise was recommended to maintain overall health and aid in weight control.   6.  Follow up as needed for subsequent injections.  7.  Patient was counseled to abstain from tobacco products.

## 2018-04-27 NOTE — ANESTHESIA PROCEDURE NOTES
PAIN Epidural block    Patient location during procedure: pain clinic  Start Time: 4/27/2018 7:48 AM  Stop Time: 4/27/2018 7:58 AM  Indication:at surgeon's request and procedure for pain  Performed By  Anesthesiologist: CHRIS KIDD  Preanesthetic Checklist  Completed: patient identified, site marked, surgical consent, pre-op evaluation, timeout performed, IV checked, risks and benefits discussed and monitors and equipment checked  Additional Notes  Dx:  Post-Op Diagnosis Codes:     * Lumbar spinal stenosis (M48.061)     * Lumbar radiculopathy (M54.16)     * Lumbago (M54.5)  80 mg depomedrol in epid    Plan : return to clinic as needed  Prep:  Pt Position:prone (prone)  Sterile Tech:cap, gloves, mask and sterile barrier  Prep:chlorhexidine gluconate and isopropyl alcohol  Monitoring:blood pressure monitoring, EKG and continuous pulse oximetry  Procedure:  Sedation: no   Approach:midline  Guidance: fluoroscopy and c arm pa and lat and loss of resistance  Location:lumbar  Level:4-5 (interlaminar)  Needle Type:Well[employer]skaylynn  Needle Gauge:20  Aspiration:negative  Medications:  Depomedrol:80 mg  Preservative Free Saline:3mL    Post Assessment:  Post-procedure: bandaid.  Pt Tolerance:patient tolerated the procedure well with no apparent complications  Complications:no

## 2018-05-23 ENCOUNTER — TELEPHONE (OUTPATIENT)
Dept: SURGERY | Facility: CLINIC | Age: 53
End: 2018-05-23

## 2018-05-23 NOTE — PROGRESS NOTES
Subjective   Patient ID: Cinthya Ayoub is a 52 y.o. female is here today for follow-up on back pain.    At the last visit the patient reported worsening back pain with intermittent pain radiating down bilateral legs right worse than left. She also reported tingling in the legs and frequent falls. Ms. Ayoub was given a referral to have some epidural injections at the last visit.    Today the patient reports that she has had 2 epidurals since the last visit and states that she has not had any falls. She reports that her pain has improved and she no longer has the radiating leg pain.     Back Pain   This is a chronic problem. The current episode started more than 1 year ago. The problem occurs daily. The problem has been gradually improving since onset. The pain is present in the lumbar spine. Associated symptoms include tingling. Pertinent negatives include no bladder incontinence, bowel incontinence, leg pain, numbness or weakness.       The following portions of the patient's history were reviewed and updated as appropriate: allergies, current medications, past family history, past medical history, past social history, past surgical history and problem list.    Review of Systems   Gastrointestinal: Negative for bowel incontinence.   Genitourinary: Negative for bladder incontinence.   Musculoskeletal: Positive for back pain.   Neurological: Positive for tingling. Negative for weakness and numbness.   All other systems reviewed and are negative.    This patient has known spinal stenosis at L4-L5 with a spondylolisthesis. We have been trying to avoid surgery. Two blocks helped her quite a bit to the extent that she will hold off on the third block. She feels much better and really has not had to take much other pain medications. Hopefully this will last a while but it is hard to know what the sustainability of this treatment is. We will see her in 4 months. She can get another block if the pain flares  up.      Objective   Physical Exam   Constitutional: She is oriented to person, place, and time. She appears well-developed and well-nourished.   HENT:   Head: Normocephalic and atraumatic.   Eyes: Conjunctivae and EOM are normal. Pupils are equal, round, and reactive to light.   Fundoscopic exam:       The right eye shows no papilledema. The right eye shows venous pulsations.        The left eye shows no papilledema. The left eye shows venous pulsations.   Neck: Carotid bruit is not present.   Neurological: She is oriented to person, place, and time. She has a normal Finger-Nose-Finger Test and a normal Heel to Shin Test. Gait normal.   Reflex Scores:       Tricep reflexes are 2+ on the right side and 2+ on the left side.       Bicep reflexes are 2+ on the right side and 2+ on the left side.       Brachioradialis reflexes are 2+ on the right side and 2+ on the left side.       Patellar reflexes are 2+ on the right side and 2+ on the left side.       Achilles reflexes are 2+ on the right side and 2+ on the left side.  Psychiatric: Her speech is normal.     Neurologic Exam     Mental Status   Oriented to person, place, and time.   Registration of memory: Good recent and remote memory.   Attention: normal. Concentration: normal.   Speech: speech is normal   Level of consciousness: alert  Knowledge: consistent with education.     Cranial Nerves     CN II   Visual fields full to confrontation.   Visual acuity: normal    CN III, IV, VI   Pupils are equal, round, and reactive to light.  Extraocular motions are normal.     CN V   Facial sensation intact.   Right corneal reflex: normal  Left corneal reflex: normal    CN VII   Facial expression full, symmetric.   Right facial weakness: none  Left facial weakness: none    CN VIII   Hearing: intact    CN IX, X   Palate: symmetric    CN XI   Right sternocleidomastoid strength: normal  Left sternocleidomastoid strength: normal    CN XII   Tongue: not atrophic  Tongue deviation:  none    Motor Exam   Muscle bulk: normal  Right arm tone: normal  Left arm tone: normal  Right leg tone: normal  Left leg tone: normal    Strength   Strength 5/5 except as noted.     Sensory Exam   Light touch normal.     Gait, Coordination, and Reflexes     Gait  Gait: normal    Coordination   Finger to nose coordination: normal  Heel to shin coordination: normal    Reflexes   Right brachioradialis: 2+  Left brachioradialis: 2+  Right biceps: 2+  Left biceps: 2+  Right triceps: 2+  Left triceps: 2+  Right patellar: 2+  Left patellar: 2+  Right achilles: 2+  Left achilles: 2+  Right : 2+  Left : 2+      Assessment/Plan   Independent Review of Radiographic Studies:    I reviewed the lumbar MRI done 10/24/17 which shows severe spinal stenosis at L4-L5 with spondylolisthesis.  Agree with the report.      Medical Decision Making:    She's doing much better after 2 blocks.  She will hold her third block in reserve.  I'll have her come back to see me in 4 months.  If there is a flareup she will let me know.  She can utilize her third block at that time.      [place] was seen today for back pain.    Diagnoses and all orders for this visit:    Spinal stenosis of lumbar region with neurogenic claudication    Spondylolisthesis at L4-L5 level    Other forms of scoliosis, thoracic region      Return in about 4 months (around 9/25/2018).

## 2018-05-25 ENCOUNTER — OFFICE VISIT (OUTPATIENT)
Dept: NEUROSURGERY | Facility: CLINIC | Age: 53
End: 2018-05-25

## 2018-05-25 VITALS
SYSTOLIC BLOOD PRESSURE: 136 MMHG | HEIGHT: 69 IN | WEIGHT: 157 LBS | HEART RATE: 87 BPM | BODY MASS INDEX: 23.25 KG/M2 | DIASTOLIC BLOOD PRESSURE: 83 MMHG

## 2018-05-25 DIAGNOSIS — M41.84 OTHER FORMS OF SCOLIOSIS, THORACIC REGION: ICD-10-CM

## 2018-05-25 DIAGNOSIS — M48.062 SPINAL STENOSIS OF LUMBAR REGION WITH NEUROGENIC CLAUDICATION: Primary | ICD-10-CM

## 2018-05-25 DIAGNOSIS — M43.16 SPONDYLOLISTHESIS AT L4-L5 LEVEL: ICD-10-CM

## 2018-05-25 PROCEDURE — 99213 OFFICE O/P EST LOW 20 MIN: CPT | Performed by: NEUROLOGICAL SURGERY

## 2018-07-03 ENCOUNTER — TRANSCRIBE ORDERS (OUTPATIENT)
Dept: PAIN MEDICINE | Facility: HOSPITAL | Age: 53
End: 2018-07-03

## 2018-07-03 DIAGNOSIS — M48.062 SPINAL STENOSIS OF LUMBAR REGION WITH NEUROGENIC CLAUDICATION: Primary | ICD-10-CM

## 2018-07-03 DIAGNOSIS — M43.16 SPONDYLOLISTHESIS AT L4-L5 LEVEL: ICD-10-CM

## 2018-07-05 ENCOUNTER — ANESTHESIA (OUTPATIENT)
Dept: PAIN MEDICINE | Facility: HOSPITAL | Age: 53
End: 2018-07-05

## 2018-07-05 ENCOUNTER — HOSPITAL ENCOUNTER (OUTPATIENT)
Dept: PAIN MEDICINE | Facility: HOSPITAL | Age: 53
Discharge: HOME | End: 2018-07-05
Admitting: ANESTHESIOLOGY

## 2018-07-05 ENCOUNTER — HOSPITAL ENCOUNTER (OUTPATIENT)
Dept: GENERAL RADIOLOGY | Facility: HOSPITAL | Age: 53
Discharge: HOME | End: 2018-07-05

## 2018-07-05 ENCOUNTER — ANESTHESIA EVENT (OUTPATIENT)
Dept: PAIN MEDICINE | Facility: HOSPITAL | Age: 53
End: 2018-07-05

## 2018-07-05 VITALS
RESPIRATION RATE: 16 BRPM | OXYGEN SATURATION: 99 % | TEMPERATURE: 98.7 F | HEART RATE: 76 BPM | DIASTOLIC BLOOD PRESSURE: 95 MMHG | SYSTOLIC BLOOD PRESSURE: 133 MMHG

## 2018-07-05 DIAGNOSIS — M43.16 SPONDYLOLISTHESIS AT L4-L5 LEVEL: ICD-10-CM

## 2018-07-05 DIAGNOSIS — R52 PAIN: ICD-10-CM

## 2018-07-05 DIAGNOSIS — M48.062 SPINAL STENOSIS OF LUMBAR REGION WITH NEUROGENIC CLAUDICATION: ICD-10-CM

## 2018-07-05 PROCEDURE — 25010000002 METHYLPREDNISOLONE PER 80 MG: Performed by: ANESTHESIOLOGY

## 2018-07-05 PROCEDURE — 77003 FLUOROGUIDE FOR SPINE INJECT: CPT

## 2018-07-05 PROCEDURE — C1755 CATHETER, INTRASPINAL: HCPCS

## 2018-07-05 RX ORDER — SODIUM CHLORIDE 0.9 % (FLUSH) 0.9 %
1-10 SYRINGE (ML) INJECTION AS NEEDED
Status: DISCONTINUED | OUTPATIENT
Start: 2018-07-05 | End: 2018-07-06 | Stop reason: HOSPADM

## 2018-07-05 RX ORDER — METHYLPREDNISOLONE ACETATE 80 MG/ML
80 INJECTION, SUSPENSION INTRA-ARTICULAR; INTRALESIONAL; INTRAMUSCULAR; SOFT TISSUE ONCE
Status: COMPLETED | OUTPATIENT
Start: 2018-07-05 | End: 2018-07-05

## 2018-07-05 RX ORDER — LIDOCAINE HYDROCHLORIDE 10 MG/ML
1 INJECTION, SOLUTION INFILTRATION; PERINEURAL ONCE AS NEEDED
Status: DISCONTINUED | OUTPATIENT
Start: 2018-07-05 | End: 2018-07-06 | Stop reason: HOSPADM

## 2018-07-05 RX ORDER — CEPHALEXIN 500 MG/1
1000 CAPSULE ORAL 3 TIMES DAILY
COMMUNITY
End: 2018-08-16

## 2018-07-05 RX ADMIN — METHYLPREDNISOLONE ACETATE 80 MG: 80 INJECTION, SUSPENSION INTRA-ARTICULAR; INTRALESIONAL; INTRAMUSCULAR; SOFT TISSUE at 09:12

## 2018-07-05 NOTE — H&P
[facility]    History and Physical    Patient Name: Cinthya Ayoub  :  1965  MRN:  [id number]  Date of Admission: 2018    Subjective     Patient is a 52 y.o. female presents with chief complaint of chronic low back pain.  Onset of symptoms was gradual starting several months ago.  Symptoms are associated/aggravated by activity. Symptoms improve with injection.  Pain worse than before after 2 months of >70% relief.  Pain radiate to right big toe.    The following portions of the patients history were reviewed and updated as appropriate: current medications, allergies, past medical history, past surgical history, past family history, past social history and problem list                Objective     Past Medical History:   Past Medical History:   Diagnosis Date   • Abnormal Pap smear of cervix    • Breast cancer metastasized to axillary lymph node, right (CMS/HCC) 2014   • Cervical dysplasia     S/P LEEP, continued abnl, TLH   • Herpes    • Low back pain    • Peripheral neuropathy    • Thyroid cancer (CMS/HCC)      Past Surgical History:   Past Surgical History:   Procedure Laterality Date   • BREAST BIOPSY     • CERVICAL BIOPSY  W/ LOOP ELECTRODE EXCISION     • HYSTERECTOMY      TLH/USO dysplasia   • HYSTEROSCOPY     • KNEE ARTHROSCOPY Left    • MASTECTOMY      B mastectomy with TRAM flap reconstruction   • OTHER SURGICAL HISTORY      lymph node transplant   • TOTAL THYROIDECTOMY      follicular cancer     Family History:   Family History   Problem Relation Age of Onset   • Colon cancer Father 74   • No Known Problems Mother    • No Known Problems Brother    • Breast cancer Neg Hx         maternal great grandmother breast   • Ovarian cancer Neg Hx      Social History:   Social History   Substance Use Topics   • Smoking status: Never Smoker   • Smokeless tobacco: Never Used   • Alcohol use Yes      Comment: socially       Vital Signs Range for the last 24 hours  Temperature: Temp:   [37.1 °C (98.7 °F)] 37.1 °C (98.7 °F)   Temp Source: Temp src: Oral   BP: BP: (129)/(92) 129/92   Pulse: Heart Rate:  [63] 63   Respirations: Resp:  [16] 16   SPO2: SpO2:  [98 %] 98 %   O2 Amount (l/min):     O2 Devices Device (Oxygen Therapy): room air   Weight:           --------------------------------------------------------------------------------    Current Outpatient Prescriptions   Medication Sig Dispense Refill   • anastrozole (ARIMIDEX) 1 MG tablet Take 1 mg by mouth Daily.     • B Complex Vitamins (VITAMIN B COMPLEX) capsule capsule Take  by mouth Daily.     • baclofen (LIORESAL) 10 MG tablet Take 10 mg by mouth 3 (Three) Times a Day.     • cephalexin (KEFLEX) 500 MG capsule Take 1,000 mg by mouth 3 (Three) Times a Day.     • gabapentin (NEURONTIN) 300 MG capsule Take 300 mg by mouth 3 (Three) Times a Day.     • HYDROcodone-acetaminophen (NORCO) 5-325 MG per tablet Take 1 tablet by mouth Every 6 (Six) Hours As Needed.     • levothyroxine (SYNTHROID, LEVOTHROID) 137 MCG tablet Take 137 mcg by mouth Daily.     • Multiple Vitamins-Minerals (MULTIVITAMIN WITH MINERALS) tablet tablet Take 1 tablet by mouth Daily.     • promethazine (PHENERGAN) 12.5 MG tablet Take 12.5 mg by mouth Every 6 (Six) Hours As Needed for Nausea or Vomiting.     • SUMAtriptan (IMITREX) 100 MG tablet Take 100 mg by mouth Every 2 (Two) Hours As Needed for Migraine.     • venlafaxine (EFFEXOR) 37.5 MG tablet Take 37.5 mg by mouth 2 (Two) Times a Day.     • Vitamin D, Cholecalciferol, (CHOLECALCIFEROL) 400 units tablet Take 400 Units by mouth Daily.     • sodium-potassium-magnesium sulfates (SUPREP BOWEL PREP KIT) 17.5-3.13-1.6 GM/180ML solution oral solution Take 1 bottle by mouth Every 12 (Twelve) Hours. 2 bottle 0     No current facility-administered medications for this encounter.        --------------------------------------------------------------------------------  Assessment/Plan      Anesthesia Evaluation     Patient summary  reviewed and Nursing notes reviewed                Airway   Mallampati: II  TM distance: >3 FB  Neck ROM: full  no difficulty expected  Dental - normal exam     Pulmonary - negative pulmonary ROS    breath sounds clear to auscultation  Cardiovascular - negative cardio ROS and normal exam  Exercise tolerance: good (4-7 METS)    Rhythm: regular  Rate: normal        Neuro/Psych- neuro exam normal  (+) numbness,     GI/Hepatic/Renal/Endo    (+)   hypothyroidism,     Musculoskeletal (-) normal exam    (+) back pain,   Abdominal  - normal exam   Substance History - negative use     OB/GYN          Other      history of cancer remission               Diagnosis and Plan    Treatment Plan  ASA 2   Patient has had previous injection/procedure with 50-75% improvement.   Procedures: Lumbar Epidural Steroid Injection(LESI), With fluoroscopy,       Anesthetic plan and risks discussed with patient.          Diagnosis     * Lumbar spinal stenosis [M48.061]     * Lumbar radicular pain [M54.16]

## 2018-07-05 NOTE — ANESTHESIA PROCEDURE NOTES
PAIN Epidural block    Patient location during procedure: pain clinic  Indication:procedure for pain  Performed By  Anesthesiologist: KHALIF HERNANDEZ  Preanesthetic Checklist  Completed: patient identified, site marked, surgical consent, pre-op evaluation, timeout performed, IV checked, risks and benefits discussed and monitors and equipment checked  Additional Notes  LSS/LRAD  Prep:  Pt Position:prone  Sterile Tech:cap, gloves and sterile barrier  Prep:chlorhexidine gluconate and isopropyl alcohol  Monitoring:EKG, continuous pulse oximetry and blood pressure monitoring  Procedure:  Sedation: no   Approach:right paramedian  Guidance: fluoroscopy  Location:lumbar  Level:4-5  Needle Type:Tuohy  Needle Gauge:20  Aspiration:negative  Medications:  Depomedrol:80  Preservative Free Saline:3mL    Post Assessment:  Dressing:secured with tape  Pt Tolerance:patient tolerated the procedure well with no apparent complications  Complications:no

## 2018-08-13 ENCOUNTER — TELEPHONE (OUTPATIENT)
Dept: GASTROENTEROLOGY | Facility: CLINIC | Age: 53
End: 2018-08-13

## 2018-08-13 NOTE — TELEPHONE ENCOUNTER
Patient forgot to stop her daypro 600mg two daily. She is scheduled for a screening colonoscopy this Friday. Is it still okay to proceed?

## 2018-08-16 ENCOUNTER — ANESTHESIA EVENT (OUTPATIENT)
Dept: PERIOP | Facility: HOSPITAL | Age: 53
End: 2018-08-16

## 2018-08-16 RX ORDER — DULOXETIN HYDROCHLORIDE 30 MG/1
30 CAPSULE, DELAYED RELEASE ORAL DAILY
COMMUNITY

## 2018-08-16 RX ORDER — METHOCARBAMOL 500 MG/1
750 TABLET, FILM COATED ORAL 3 TIMES DAILY
COMMUNITY
End: 2022-07-20

## 2018-08-17 ENCOUNTER — HOSPITAL ENCOUNTER (OUTPATIENT)
Facility: HOSPITAL | Age: 53
Setting detail: HOSPITAL OUTPATIENT SURGERY
Discharge: HOME | End: 2018-08-17
Attending: INTERNAL MEDICINE | Admitting: NURSE ANESTHETIST, CERTIFIED REGISTERED

## 2018-08-17 ENCOUNTER — ANESTHESIA (OUTPATIENT)
Dept: PERIOP | Facility: HOSPITAL | Age: 53
End: 2018-08-17

## 2018-08-17 VITALS
SYSTOLIC BLOOD PRESSURE: 148 MMHG | TEMPERATURE: 98 F | DIASTOLIC BLOOD PRESSURE: 91 MMHG | HEIGHT: 69 IN | OXYGEN SATURATION: 98 % | BODY MASS INDEX: 21.48 KG/M2 | RESPIRATION RATE: 14 BRPM | HEART RATE: 52 BPM | WEIGHT: 145 LBS

## 2018-08-17 DIAGNOSIS — Z12.11 COLON CANCER SCREENING: ICD-10-CM

## 2018-08-17 PROCEDURE — 25010000002 PROPOFOL 10 MG/ML EMULSION: Performed by: NURSE ANESTHETIST, CERTIFIED REGISTERED

## 2018-08-17 PROCEDURE — 45380 COLONOSCOPY AND BIOPSY: CPT | Performed by: INTERNAL MEDICINE

## 2018-08-17 RX ORDER — SODIUM CHLORIDE 0.9 % (FLUSH) 0.9 %
1-10 SYRINGE (ML) INJECTION AS NEEDED
Status: DISCONTINUED | OUTPATIENT
Start: 2018-08-17 | End: 2018-08-17 | Stop reason: HOSPADM

## 2018-08-17 RX ORDER — SODIUM CHLORIDE, SODIUM LACTATE, POTASSIUM CHLORIDE, CALCIUM CHLORIDE 600; 310; 30; 20 MG/100ML; MG/100ML; MG/100ML; MG/100ML
9 INJECTION, SOLUTION INTRAVENOUS CONTINUOUS PRN
Status: DISCONTINUED | OUTPATIENT
Start: 2018-08-17 | End: 2018-08-17 | Stop reason: HOSPADM

## 2018-08-17 RX ORDER — PROPOFOL 10 MG/ML
VIAL (ML) INTRAVENOUS AS NEEDED
Status: DISCONTINUED | OUTPATIENT
Start: 2018-08-17 | End: 2018-08-17 | Stop reason: SURG

## 2018-08-17 RX ORDER — PROPOFOL 10 MG/ML
VIAL (ML) INTRAVENOUS CONTINUOUS PRN
Status: DISCONTINUED | OUTPATIENT
Start: 2018-08-17 | End: 2018-08-17 | Stop reason: SURG

## 2018-08-17 RX ORDER — LIDOCAINE HYDROCHLORIDE 10 MG/ML
0.5 INJECTION, SOLUTION EPIDURAL; INFILTRATION; INTRACAUDAL; PERINEURAL ONCE AS NEEDED
Status: DISCONTINUED | OUTPATIENT
Start: 2018-08-17 | End: 2018-08-17 | Stop reason: HOSPADM

## 2018-08-17 RX ORDER — MAGNESIUM HYDROXIDE 1200 MG/15ML
LIQUID ORAL AS NEEDED
Status: DISCONTINUED | OUTPATIENT
Start: 2018-08-17 | End: 2018-08-17 | Stop reason: HOSPADM

## 2018-08-17 RX ORDER — SODIUM CHLORIDE 9 MG/ML
40 INJECTION, SOLUTION INTRAVENOUS AS NEEDED
Status: DISCONTINUED | OUTPATIENT
Start: 2018-08-17 | End: 2018-08-17 | Stop reason: HOSPADM

## 2018-08-17 RX ORDER — HYDROCODONE BITARTRATE AND ACETAMINOPHEN 5; 325 MG/1; MG/1
1 TABLET ORAL ONCE
Status: COMPLETED | OUTPATIENT
Start: 2018-08-17 | End: 2018-08-17

## 2018-08-17 RX ORDER — LIDOCAINE HYDROCHLORIDE 10 MG/ML
INJECTION, SOLUTION INFILTRATION; PERINEURAL AS NEEDED
Status: DISCONTINUED | OUTPATIENT
Start: 2018-08-17 | End: 2018-08-17 | Stop reason: SURG

## 2018-08-17 RX ADMIN — PROPOFOL 50 MG: 10 INJECTION, EMULSION INTRAVENOUS at 08:59

## 2018-08-17 RX ADMIN — PROPOFOL 50 MG: 10 INJECTION, EMULSION INTRAVENOUS at 08:56

## 2018-08-17 RX ADMIN — LIDOCAINE HYDROCHLORIDE 50 MG: 10 INJECTION, SOLUTION INFILTRATION; PERINEURAL at 08:54

## 2018-08-17 RX ADMIN — PROPOFOL 50 MG: 10 INJECTION, EMULSION INTRAVENOUS at 09:20

## 2018-08-17 RX ADMIN — PROPOFOL 50 MG: 10 INJECTION, EMULSION INTRAVENOUS at 09:07

## 2018-08-17 RX ADMIN — SODIUM CHLORIDE, POTASSIUM CHLORIDE, SODIUM LACTATE AND CALCIUM CHLORIDE: 600; 310; 30; 20 INJECTION, SOLUTION INTRAVENOUS at 08:54

## 2018-08-17 RX ADMIN — PROPOFOL 50 MG: 10 INJECTION, EMULSION INTRAVENOUS at 09:15

## 2018-08-17 RX ADMIN — PROPOFOL 50 MG: 10 INJECTION, EMULSION INTRAVENOUS at 09:03

## 2018-08-17 RX ADMIN — PROPOFOL 50 MG: 10 INJECTION, EMULSION INTRAVENOUS at 09:12

## 2018-08-17 RX ADMIN — PROPOFOL 50 MG: 10 INJECTION, EMULSION INTRAVENOUS at 09:25

## 2018-08-17 RX ADMIN — HYDROCODONE BITARTRATE AND ACETAMINOPHEN 1 TABLET: 5; 325 TABLET ORAL at 08:18

## 2018-08-17 RX ADMIN — PROPOFOL 100 MCG/KG/MIN: 10 INJECTION, EMULSION INTRAVENOUS at 08:55

## 2018-08-17 NOTE — H&P
[facility]  Pre-procedure H&P    Chief Complaint: screening colonoscopy    Cinthya Ayoub is a 52 y.o. female  who is referred by Danielle Morelos MD for a colonoscopy. She is an Asymptomatic person Age 50 years and older who has a history of screening for colon cancer.   She has had colonoscopy 0 years ago.    She denies any change in bowel function, melena, hematochezia or unexplained weight loss.    Past Medical History:   Diagnosis Date   • Abnormal Pap smear of cervix    • Breast cancer metastasized to axillary lymph node, right (CMS/HCC) 03/07/2014   • Cervical dysplasia     S/P LEEP, continued abnl, TLH   • Herpes    • Low back pain    • Peripheral neuropathy    • Thyroid cancer (CMS/HCC)        Past Surgical History:   Procedure Laterality Date   • BREAST BIOPSY     • CERVICAL BIOPSY  W/ LOOP ELECTRODE EXCISION     • HYSTERECTOMY      TLH/USO dysplasia   • HYSTEROSCOPY  2008   • KNEE ARTHROSCOPY Left 1982   • MASTECTOMY      B mastectomy with TRAM flap reconstruction   • OTHER SURGICAL HISTORY      lymph node transplant   • TOTAL THYROIDECTOMY      follicular cancer       Family History   Problem Relation Age of Onset   • Colon cancer Father 74   • No Known Problems Mother    • No Known Problems Brother    • Breast cancer Neg Hx         maternal great grandmother breast   • Ovarian cancer Neg Hx        Social History     Social History   • Marital status:      Spouse name: N/A   • Number of children: 0   • Years of education: DOCTORAL     Occupational History   • ELECTED PROSECUTOR,       Social History Main Topics   • Smoking status: Never Smoker   • Smokeless tobacco: Never Used   • Alcohol use Yes      Comment: socially    • Drug use: No   • Sexual activity: No      Comment: hysterectomy     Other Topics Concern   • Not on file     Social History Narrative    LIVES ALONE         Current Facility-Administered Medications:   •  sterile water 1,000 mL with  simethicone 40 MG/0.6ML 3 mL mixture, , , PRN, Danielle Morelos MD, 300 mL at 08/17/18 0822  •  sterile water irrigation solution, , , PRN, Danielle Morelos MD, 1,000 mL at 08/17/18 0822    Allergies   Allergen Reactions   • Ciprofloxacin Anaphylaxis   • Codeine Nausea And Vomiting   • Levaquin [Levofloxacin In D5w] Unknown (See Comments)     Lost concious            Vitals:    08/17/18 0812   BP: 123/85   Pulse: 64   Temp: 98.3 °F (36.8 °C)   SpO2: 100%       Physical Exam:   General: patient awake, alert and cooperative   Eyes: Normal lids and lashes, no scleral icterus   Neck: supple, normal ROM   Skin: warm and dry, not jaundiced   Cardiovascular: regular rhythm and rate, no murmurs auscultated   Pulm: clear to auscultation bilaterally, regular and unlabored   Abdomen: soft, nontender, nondistended; normal bowel sounds   Extremities: no rash or edema   Psychiatric: Normal mood and behavior         Assessment/Plan       No diagnosis found.    Schedule for colonoscopy.      Indications, risks and procedure were discussed with the patient, including but not limited to, bleeding, infection, possibility of perforation and possible polypectomy. All of the patient's questions were answered, and signed informed consent was obtained and placed on the chart.         Danielle Morelos MD  [facility]  [unfilled]

## 2018-08-17 NOTE — OP NOTE
Colonoscopy Note:    Indication:  Screening colon, family history of colon cancer and polyps    Consent: Procedure colonoscopy was explained to the patient and detail including but not limited to the, patient of bleeding perforation and possible reactions to sedation.    Sedation: Sedation was provided by anesthesia.    Procedure:  After excellent sedation a digital rectal examination is performed and a flexible colonoscope was inserted into the rectum passed to the cecum.  The cecum was identified by both the ileocecal valve and the appendiceal orifice.  The overall bowel preparation was fair to good.  Upon withdrawal scope careful examination mucosa was made.  Pertinent findings include a 4 mm sessile ascending polyp removed completely with forceps and a 3 mm sessile rectal polyp removed with forceps.  The scope was slowly withdrawn to the rectum and retroflex were internal hemorrhoids are noted.  The scope was straightened and withdrawn out of the patient with no immediate, patient's and she tolerated procedure well.    Impression/Plan:  Colon polyps removed with forceps  Await pathology  Repeat in 5 years

## 2018-08-17 NOTE — ANESTHESIA POSTPROCEDURE EVALUATION
Patient: Cinthya Ayoub    Procedure Summary     Date:  08/17/18 Room / Location:  [facility] ENDOSCOPY 2 /  [facility]    Anesthesia Start:  0838 Anesthesia Stop:  0936    Procedure:  COLONOSCOPY, polypectomy (N/A ) Diagnosis:       Colon cancer screening      (Colon cancer screening [Z12.11])    Surgeon:  Danielle Morelos MD Provider:  Benny Richmond CRNA    Anesthesia Type:  MAC ASA Status:  2          Anesthesia Type: MAC  Last vitals  BP   128/84 (08/17/18 0950)   Temp   98 °F (36.7 °C) (08/17/18 0939)   Pulse   53 (08/17/18 0950)   Resp   14 (08/17/18 0950)     SpO2   98 % (08/17/18 0950)     Post Anesthesia Care and Evaluation    Patient location during evaluation: PHASE II  Patient participation: complete - patient participated  Level of consciousness: awake  Pain management: adequate  Airway patency: patent  Anesthetic complications: No anesthetic complications  PONV Status: noneRespiratory status: acceptable  Hydration status: acceptable

## 2018-08-17 NOTE — ANESTHESIA PREPROCEDURE EVALUATION
Anesthesia Evaluation     Patient summary reviewed and Nursing notes reviewed   no history of anesthetic complications:  NPO Solid Status: > 8 hours  NPO Liquid Status: > 8 hours           Airway   Mallampati: II  TM distance: >3 FB  Neck ROM: full  No difficulty expected  Dental      Pulmonary - negative pulmonary ROS    breath sounds clear to auscultation  Cardiovascular - negative cardio ROS  Exercise tolerance: good (4-7 METS)    Rhythm: regular  Rate: normal        Neuro/Psych  (+) headaches, numbness (LBP, RIGHT LE),     Neuromuscular disease: FIBROMYALGIA ?  GI/Hepatic/Renal/Endo      ROS Comment: THYROID CA HX REMOVAL OF THYROID     Musculoskeletal     (+) back pain, chronic pain (STEROID INJECTIONS, SEVERE BACK PAIN TODAY WITH SHOOTING NERVE PAIN DOWN RIGHT LE, WILL TAKE NORMAL PAIN MEDS NOW),   Abdominal    Substance History - negative use     OB/GYN          Other   (+) arthritis   history of cancer (BREAST CANCER RIGHT, THYROID CANCER) remission                    Anesthesia Plan    ASA 2     MAC     intravenous induction   Anesthetic plan and risks discussed with patient.  Use of blood products discussed with patient  Consented to blood products.

## 2018-08-21 LAB
LAB AP CASE REPORT: NORMAL
PATH REPORT.FINAL DX SPEC: NORMAL

## 2019-06-13 ENCOUNTER — OFFICE VISIT (OUTPATIENT)
Dept: OBSTETRICS AND GYNECOLOGY | Facility: CLINIC | Age: 54
End: 2019-06-13

## 2019-06-13 VITALS
HEIGHT: 69 IN | WEIGHT: 149 LBS | BODY MASS INDEX: 22.07 KG/M2 | DIASTOLIC BLOOD PRESSURE: 76 MMHG | SYSTOLIC BLOOD PRESSURE: 120 MMHG

## 2019-06-13 DIAGNOSIS — Z11.3 SCREEN FOR STD (SEXUALLY TRANSMITTED DISEASE): Primary | ICD-10-CM

## 2019-06-13 DIAGNOSIS — Z13.9 SCREENING FOR CONDITION: ICD-10-CM

## 2019-06-13 DIAGNOSIS — Z01.419 ENCOUNTER FOR GYNECOLOGICAL EXAMINATION WITHOUT ABNORMAL FINDING: ICD-10-CM

## 2019-06-13 DIAGNOSIS — Z85.9 HISTORY OF CANCER: ICD-10-CM

## 2019-06-13 DIAGNOSIS — Z01.419 PAP SMEAR, LOW-RISK: ICD-10-CM

## 2019-06-13 DIAGNOSIS — Z11.51 SPECIAL SCREENING EXAMINATION FOR HUMAN PAPILLOMAVIRUS (HPV): ICD-10-CM

## 2019-06-13 LAB
BILIRUB BLD-MCNC: NEGATIVE MG/DL
CLARITY, POC: CLEAR
COLOR UR: YELLOW
GLUCOSE UR STRIP-MCNC: NEGATIVE MG/DL
KETONES UR QL: NEGATIVE
LEUKOCYTE EST, POC: NEGATIVE
NITRITE UR-MCNC: NEGATIVE MG/ML
PH UR: 6 [PH] (ref 5–8)
PROT UR STRIP-MCNC: NEGATIVE MG/DL
RBC # UR STRIP: NEGATIVE /UL
SP GR UR: 1.03 (ref 1–1.03)
UROBILINOGEN UR QL: NORMAL

## 2019-06-13 PROCEDURE — 99396 PREV VISIT EST AGE 40-64: CPT | Performed by: OBSTETRICS & GYNECOLOGY

## 2019-06-13 PROCEDURE — 81002 URINALYSIS NONAUTO W/O SCOPE: CPT | Performed by: OBSTETRICS & GYNECOLOGY

## 2019-06-13 RX ORDER — BETAMETHASONE DIPROPIONATE 0.05 %
OINTMENT (GRAM) TOPICAL
COMMUNITY
Start: 2019-02-21 | End: 2022-07-20

## 2019-06-13 RX ORDER — GABAPENTIN 600 MG/1
300 TABLET ORAL 3 TIMES DAILY
Refills: 3 | COMMUNITY
Start: 2019-06-07

## 2019-06-13 RX ORDER — SUMATRIPTAN 100 MG/1
100 TABLET, FILM COATED ORAL
COMMUNITY

## 2019-06-13 RX ORDER — PROMETHAZINE HYDROCHLORIDE 25 MG/1
25 TABLET ORAL
COMMUNITY
Start: 2018-10-12

## 2019-06-13 RX ORDER — HYDROXYZINE HYDROCHLORIDE 25 MG/1
TABLET, FILM COATED ORAL
COMMUNITY
Start: 2018-08-15 | End: 2022-07-20

## 2019-06-13 RX ORDER — PREDNISOLONE ACETATE 10 MG/ML
SUSPENSION/ DROPS OPHTHALMIC
COMMUNITY
Start: 2019-03-08 | End: 2022-07-20

## 2019-06-13 RX ORDER — FLUOROURACIL 50 MG/G
CREAM TOPICAL
COMMUNITY
Start: 2019-02-21 | End: 2022-07-20

## 2019-06-13 RX ORDER — TOBRAMYCIN 3 MG/ML
SOLUTION/ DROPS OPHTHALMIC
Refills: 0 | COMMUNITY
Start: 2019-03-07 | End: 2022-07-20

## 2019-06-13 RX ORDER — CYCLOBENZAPRINE HCL 10 MG
10 TABLET ORAL 3 TIMES DAILY
COMMUNITY
Start: 2018-10-03

## 2019-06-13 NOTE — PROGRESS NOTES
"GYN Annual Exam     CC- Here for annual exam.     Cinthya Ayoub is a 53 y.o. female established patient who presents for annual well woman exam. She had a neck fusion and feels good. She denies any  VB. She is using Estring still with the blessing of her oncologist. She is now cancer free. She never heard back from the  and would like to try to get that done. She plans on seeing \"Vern\" and \"Dear Paddy Plasencia\" soon.       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   Last pap smear:2018- normal pap/HPV  Gardasil: none  KEVIN: none      Health Maintenance   Topic Date Due   • TDAP/TD VACCINES (1 - Tdap) 1984   • ZOSTER VACCINE (1 of 2) 2015   • MAMMOGRAM  2018   • ANNUAL PHYSICAL  2019   • INFLUENZA VACCINE  2019   • ANNUAL GYN EXAM  2020   • PAP SMEAR  2021   • COLONOSCOPY  2023   • HEPATITIS C SCREENING  Completed       Past Medical History:   Diagnosis Date   • Abnormal Pap smear of cervix    • Breast cancer metastasized to axillary lymph node, right (CMS/HCC) 2014   • Cervical dysplasia     S/P LEEP, continued abnl, TLH   • Colon polyp    • Herpes     HSV 1 by blood   • Low back pain    • Peripheral neuropathy    • Thyroid cancer (CMS/HCC)     h/o thyroid cancer       Past Surgical History:   Procedure Laterality Date   • BREAST BIOPSY     • CERVICAL BIOPSY  W/ LOOP ELECTRODE EXCISION     • " CERVICAL SPINE SURGERY     • COLONOSCOPY N/A 8/17/2018    Procedure: COLONOSCOPY, polypectomy;  Surgeon: Danielle Morelos MD;  [facility]: [facility];  Service: Gastroenterology   • HYSTERECTOMY      TLH/USO dysplasia   • HYSTEROSCOPY  2008   • KNEE ARTHROSCOPY Left 1982   • MASTECTOMY      B mastectomy with TRAM flap reconstruction   • OTHER SURGICAL HISTORY      lymph node transplant   • TOTAL THYROIDECTOMY      follicular cancer         Current Outpatient Medications:   •  betamethasone dipropionate (DIPROLENE) 0.05 % ointment, by Intratympanic route., Disp: , Rfl:   •  Cholecalciferol (VITAMIN D) 1000 units tablet, Take 2,000 Units by mouth Daily., Disp: , Rfl:   •  cyclobenzaprine (FLEXERIL) 10 MG tablet, Take 10 mg by mouth., Disp: , Rfl:   •  estradiol (ESTRING) 2 MG vaginal ring, PLACE RING VAGINALLY EVERY 3 MONTHS FOLLOWING PACKAGE DIRECTIONS, Disp: , Rfl:   •  fluorouracil (EFUDEX) 5 % cream, by Intratympanic route., Disp: , Rfl:   •  hydrOXYzine (ATARAX) 25 MG tablet, as needed, Disp: , Rfl:   •  promethazine (PHENERGAN) 25 MG tablet, Take 25 mg by mouth., Disp: , Rfl:   •  anastrozole (ARIMIDEX) 1 MG tablet, Take 1 mg by mouth Daily., Disp: , Rfl:   •  B Complex Vitamins (VITAMIN B COMPLEX) capsule capsule, Take  by mouth Daily., Disp: , Rfl:   •  DULoxetine (CYMBALTA) 30 MG capsule, Take 30 mg by mouth Daily., Disp: , Rfl:   •  gabapentin (NEURONTIN) 600 MG tablet, TAKE ONE TABLET BY MOUTH EVERY SIX HOURS., Disp: , Rfl: 3  •  HYDROcodone-acetaminophen (NORCO) 5-325 MG per tablet, Take 1 tablet by mouth Every 6 (Six) Hours As Needed., Disp: , Rfl:   •  levothyroxine (SYNTHROID, LEVOTHROID) 137 MCG tablet, Take 137 mcg by mouth Daily., Disp: , Rfl:   •  methocarbamol (ROBAXIN) 500 MG tablet, Take 750 mg by mouth 3 (Three) Times a Day., Disp: , Rfl:   •  Multiple Vitamins-Minerals (MULTIVITAMIN WITH MINERALS) tablet tablet, Take 1 tablet by mouth Daily., Disp: , Rfl:   •  oxaprozin (DAYPRO) 600 MG tablet, Take  1,200 mg by mouth Daily., Disp: , Rfl:   •  prednisoLONE acetate (PRED FORTE) 1 % ophthalmic suspension, , Disp: , Rfl:   •  SUMAtriptan (IMITREX) 100 MG tablet, Take 100 mg by mouth., Disp: , Rfl:   •  tobramycin 0.3 % solution ophthalmic solution, , Disp: , Rfl: 0    Allergies   Allergen Reactions   • Ciprofloxacin Anaphylaxis     Low b/p   • Oxycodone-Acetaminophen Other (See Comments)     Makes patient cry and could not eat   • Codeine Nausea And Vomiting     Felt hyperactive and out of head   • Levaquin [Levofloxacin In D5w] Unknown (See Comments)     Lost concious   • Levofloxacin Rash     Low b/p   Lost concious         Social History     Tobacco Use   • Smoking status: Never Smoker   • Smokeless tobacco: Never Used   Substance Use Topics   • Alcohol use: Yes     Comment: socially    • Drug use: No       Family History   Problem Relation Age of Onset   • Colon cancer Father 74   • No Known Problems Mother    • No Known Problems Brother    • Breast cancer Maternal Great-Grandmother    • Ovarian cancer Neg Hx        Review of Systems   Constitutional: Negative for appetite change, fatigue, fever and unexpected weight change.   Eyes: Negative for photophobia and visual disturbance.   Respiratory: Negative for cough and shortness of breath.    Cardiovascular: Negative for chest pain and palpitations.   Gastrointestinal: Negative for abdominal distention, abdominal pain, constipation, diarrhea and nausea.   Endocrine: Negative for cold intolerance and heat intolerance.   Genitourinary: Negative for dyspareunia, dysuria, menstrual problem, pelvic pain and vaginal discharge.   Musculoskeletal: Positive for neck pain (better). Negative for back pain.   Skin: Negative for color change and rash.   Neurological: Negative for headaches.   Hematological: Negative for adenopathy. Does not bruise/bleed easily.   Psychiatric/Behavioral: Negative for dysphoric mood. The patient is not nervous/anxious.        /76   Ht  "175.3 cm (69\")   Wt 67.6 kg (149 lb)   BMI 22.00 kg/m²     Physical Exam   Constitutional: She is oriented to person, place, and time. She appears well-developed and well-nourished.   HENT:   Head: Normocephalic and atraumatic.   Eyes: Conjunctivae are normal. No scleral icterus.   Neck: Neck supple. No thyromegaly present.   Cardiovascular: Normal rate and regular rhythm.   Pulmonary/Chest: Effort normal and breath sounds normal. Right breast exhibits no inverted nipple, no mass, no nipple discharge, no skin change and no tenderness. Left breast exhibits no inverted nipple, no mass, no nipple discharge, no skin change and no tenderness.   B Mastectomy with reconstruction   Abdominal: Soft. Bowel sounds are normal. She exhibits no distension and no mass. There is no tenderness. There is no rebound and no guarding. No hernia.   Genitourinary: Pelvic exam was performed with patient supine. There is no rash, tenderness or lesion on the right labia. There is no rash, tenderness or lesion on the left labia. Right adnexum displays no mass, no tenderness and no fullness. Left adnexum displays no mass, no tenderness and no fullness. No erythema, tenderness or bleeding in the vagina.   There is a foreign body in the vagina. No signs of injury around the vagina. Vaginal discharge found.   Genitourinary Comments: Normal cuff  No masses  Scant discharge  Estring noted   Musculoskeletal: Normal range of motion.   Neurological: She is alert and oriented to person, place, and time.   Skin: Skin is warm and dry.   Psychiatric: She has a normal mood and affect. Her behavior is normal. Judgment and thought content normal.   Nursing note and vitals reviewed.         Assessment/Plan    1) GYN HM: UTD 2/2018  SBE demonstrated and encouraged.  2) STD screening: accepts Condoms encouraged.  3) Bone health - Weight bearing exercise, dietary calcium recommendations and vitamin D reviewed.   4) Diet and Exercise discussed  5) Smoking " Status: No  6) Social: no issues, dating again.  for [employer] [employer].  7)MMG: N/A, s/p B mastectomy. BRCA neg  8) DEXA-plan age 55  9)C scope-UTD 8/2018- repeat 5 years   10) h/o 2 cancers at a young age- refer .   11) Possible HSV on vulva- blood work only showed HSV 1, advised her to come in when she is having any vanigal discomfort and we can culture the lesion for correct diagnosis.   12) Vaginal estrogen use- Patient counseled that vaginal estrogen rings, creams and tablets are available and highly effective at treating local vaginal symptoms such as atrophy and vaginal dryness.  Vaginal estrogen does not cause uterine overgrowth and does not require a progestogen to protect the uterus.  Very small amounts of estrogen are absorbed systemically.  For patients with a history of an estrogen dependent cancer such as breast cancer, the decision to use local estrogen for local vaginal symptoms should be made after consultation with her oncologist.  Possible side effects include local irritation or burning and/or vaginal bleeding and should always be reported.  I do not recommend vaginal estrogen use for someone with a h/o of an E2 positive breast cancer, pt says her oncologist is aware and prescribing.      Follow up prn and 1 year       [place]ia was seen today for gynecologic exam.    Diagnoses and all orders for this visit:    Screen for STD (sexually transmitted disease)  -     Chlamydia trachomatis, Neisseria gonorrhoeae, Trichomonas vaginalis, PCR - Urine, Urine, Random Void  -     Hepatitis B Surface Antigen  -     Hepatitis C Antibody  -     HIV-1 / O / 2 Ag / Antibody 4th Generation  -     HSV 1 & 2 - Specific Antibody, IgG  -     RPR, Rfx Qn RPR / Confirm TP    Screening for condition  -     POC Urinalysis Dipstick    Special screening examination for human papillomavirus (HPV)  -     Cancel: Pap IG, HPV-hr    Pap smear, low-risk  -     Cancel: Pap IG, HPV-hr        Edelmira Mcelroy  MD [facility]  6/13/19  2:25 PM

## 2019-06-14 LAB
HBV SURFACE AG SERPL QL IA: NEGATIVE
HCV AB S/CO SERPL IA: <0.1 S/CO RATIO (ref 0–0.9)
HIV 1+2 AB+HIV1 P24 AG SERPL QL IA: NON REACTIVE
HSV1 IGG SER IA-ACNC: 21.7 INDEX (ref 0–0.9)
HSV2 IGG SER IA-ACNC: <0.91 INDEX (ref 0–0.9)
RPR SER QL: NON REACTIVE

## 2019-06-16 PROBLEM — Z12.11 COLON CANCER SCREENING: Status: RESOLVED | Noted: 2018-04-11 | Resolved: 2019-06-16

## 2019-06-16 LAB
C TRACH RRNA SPEC QL NAA+PROBE: NEGATIVE
N GONORRHOEA RRNA SPEC QL NAA+PROBE: NEGATIVE
T VAGINALIS RRNA VAG QL NAA+PROBE: POSITIVE

## 2019-06-16 RX ORDER — TINIDAZOLE 500 MG/1
TABLET ORAL
Qty: 14 TABLET | Refills: 2 | Status: SHIPPED | OUTPATIENT
Start: 2019-06-16 | End: 2022-07-20

## 2019-06-16 NOTE — PROGRESS NOTES
6/16/19- I called and spoke with pt by phone re: natalia. Her partners need to be treated, condoms, YEIMI in 1month.Called in Rx for tindamax

## 2019-10-02 ENCOUNTER — TELEPHONE (OUTPATIENT)
Dept: OBSTETRICS AND GYNECOLOGY | Facility: CLINIC | Age: 54
End: 2019-10-02

## 2019-10-02 NOTE — TELEPHONE ENCOUNTER
PT CALLED AND STATED SHE NEEDS TO BE SEEN BY YOU FROM A F/U FROM [facility] THROUGH [place]. SHE STATED SHE NEEDS HER LAST OVARY REMOVED.  IS IT OK TO WORK HER IN WHEN YOU GET BACK? THANKS

## 2019-10-04 ENCOUNTER — TRANSCRIBE ORDERS (OUTPATIENT)
Dept: ADMINISTRATIVE | Facility: HOSPITAL | Age: 54
End: 2019-10-04

## 2019-10-04 ENCOUNTER — HOSPITAL ENCOUNTER (OUTPATIENT)
Dept: ULTRASOUND IMAGING | Facility: HOSPITAL | Age: 54
Discharge: HOME | End: 2019-10-04
Admitting: PHYSICIAN ASSISTANT

## 2019-10-04 DIAGNOSIS — M79.605 LEFT LEG PAIN: Primary | ICD-10-CM

## 2019-10-04 DIAGNOSIS — M79.605 LEFT LEG PAIN: ICD-10-CM

## 2019-10-04 PROCEDURE — 93971 EXTREMITY STUDY: CPT

## 2019-10-17 ENCOUNTER — PROCEDURE VISIT (OUTPATIENT)
Dept: OBSTETRICS AND GYNECOLOGY | Facility: CLINIC | Age: 54
End: 2019-10-17

## 2019-10-17 ENCOUNTER — OFFICE VISIT (OUTPATIENT)
Dept: OBSTETRICS AND GYNECOLOGY | Facility: CLINIC | Age: 54
End: 2019-10-17

## 2019-10-17 VITALS
DIASTOLIC BLOOD PRESSURE: 82 MMHG | SYSTOLIC BLOOD PRESSURE: 120 MMHG | HEIGHT: 69 IN | BODY MASS INDEX: 22.72 KG/M2 | WEIGHT: 153.4 LBS

## 2019-10-17 DIAGNOSIS — C50.919 MALIGNANT NEOPLASM OF FEMALE BREAST, UNSPECIFIED ESTROGEN RECEPTOR STATUS, UNSPECIFIED LATERALITY, UNSPECIFIED SITE OF BREAST (HCC): Primary | ICD-10-CM

## 2019-10-17 DIAGNOSIS — Z11.3 SCREEN FOR STD (SEXUALLY TRANSMITTED DISEASE): Primary | ICD-10-CM

## 2019-10-17 DIAGNOSIS — N89.8 VAGINAL DRYNESS: ICD-10-CM

## 2019-10-17 DIAGNOSIS — C50.919 MALIGNANT NEOPLASM OF FEMALE BREAST, UNSPECIFIED ESTROGEN RECEPTOR STATUS, UNSPECIFIED LATERALITY, UNSPECIFIED SITE OF BREAST (HCC): ICD-10-CM

## 2019-10-17 DIAGNOSIS — Z13.9 SCREENING FOR CONDITION: ICD-10-CM

## 2019-10-17 DIAGNOSIS — Z90.721 H/O UNILATERAL OOPHORECTOMY: ICD-10-CM

## 2019-10-17 PROCEDURE — 99214 OFFICE O/P EST MOD 30 MIN: CPT | Performed by: OBSTETRICS & GYNECOLOGY

## 2019-10-17 PROCEDURE — 76830 TRANSVAGINAL US NON-OB: CPT | Performed by: OBSTETRICS & GYNECOLOGY

## 2019-10-17 PROCEDURE — 81002 URINALYSIS NONAUTO W/O SCOPE: CPT | Performed by: OBSTETRICS & GYNECOLOGY

## 2019-10-17 NOTE — PROGRESS NOTES
"      Cinthya Ayoub is a 53 y.o. patient who presents for follow up of   Chief Complaint   Patient presents with   • Follow-up     gyn     54 yo est pt here for TVUS and discussion of surgery. She also needs a YEIMI for trich. She has a history of breast cancer and thyroid cancer. She has had a TLH/USO for dysplasia. She has one remaining ovary and was seen by a  at [facility] who recommended that she have her remaining ovary and appendix removed. Her US today shows no visible ovary remaining and there is no comparable data. Her workup from the  is still pending. We discussed removal of appendix and ovary and I recommend that this be done by gyn onc so that they can do a definitive surgery should anything be found and she is agreeable. She is also on Estring vaginally, despite the fact that her tumor was ER+. She has discussed this at length with her oncologist and has been cleared for use. She understands that any use of vaginal estrogen may increase risk of recurrence, but feels that her vaginal dryness without some therapy is too miserable to bear.  We did discuss changing to Imvecty 4 mcg twice a week, which is lower than the 7 to 8 micrograms daily that one receives from Estring.  She is agreeable to this change.      The following portions of the patient's history were reviewed and updated as appropriate: allergies, current medications and problem list.    Review of Systems   Constitutional: Negative for chills and fever.   Gastrointestinal: Negative for abdominal pain.   Genitourinary: Negative for dysuria, pelvic pain, vaginal bleeding and vaginal discharge.   Musculoskeletal: Negative for back pain and joint swelling.   All other systems reviewed and are negative.      /82   Ht 175.3 cm (69.02\")   Wt 69.6 kg (153 lb 6.4 oz)   LMP  (LMP Unknown)   Breastfeeding? No   BMI 22.64 kg/m²     Physical Exam   Constitutional: She is oriented to person, place, and time. She appears " well-developed and well-nourished.   HENT:   Head: Normocephalic and atraumatic.   Eyes: Conjunctivae are normal. No scleral icterus.   Abdominal: Soft. Bowel sounds are normal. She exhibits no distension and no mass. There is no tenderness. There is no rebound and no guarding. No hernia.   Neurological: She is alert and oriented to person, place, and time.   Skin: Skin is warm and dry.   Psychiatric: She has a normal mood and affect. Her behavior is normal. Judgment and thought content normal.   Nursing note and vitals reviewed.      A/P:  1. H/O trich-she and her partner were treated.  She is not using condoms.  Check full STD panel.  Encourage condoms.  2. Vaginal atrophy- Patient counseled that vaginal estrogen rings, creams and tablets are available and highly effective at treating local vaginal symptoms such as atrophy and vaginal dryness.  Vaginal estrogen does not cause uterine overgrowth and does not require a progestogen to protect the uterus.  Very small amounts of estrogen are absorbed systemically.  For patients with a history of an estrogen dependent cancer such as breast cancer, the decision to use local estrogen for local vaginal symptoms should be made after consultation with her oncologist.  Possible side effects include local irritation or burning and/or vaginal bleeding and should always be reported.  Rx Imvecty 4 mcg x 2/week.   3. H/O breast and thyroid cancer-patient is that she has Alina negative, is awaiting her results from [facility] at [place].  They recommend removal of her remaining ovary and her appendix.  Will refer to Dr. Frost at GYN oncology for surgery.  4. [facility] annual 6/2019    Assessment/Plan   Virginia was seen today for follow-up.    Diagnoses and all orders for this visit:    Screen for STD (sexually transmitted disease)  -     Chlamydia trachomatis, Neisseria gonorrhoeae, Trichomonas vaginalis, PCR - Urine, Urine, Random Void  -     Hepatitis B Surface Antigen  -      Hepatitis C Antibody  -     HIV-1 / O / 2 Ag / Antibody 4th Generation  -     HSV 1 & 2 - Specific Antibody, IgG  -     RPR, Rfx Qn RPR / Confirm TP    Screening for condition  -     POC Urinalysis Dipstick    H/O unilateral oophorectomy  -     Ambulatory Referral to Gynecologic Oncology    Malignant neoplasm of female breast, unspecified estrogen receptor status, unspecified laterality, unspecified site of breast (CMS/HCC)  -     Ambulatory Referral to Gynecologic Oncology    Vaginal dryness    Other orders  -     Estradiol (IMVEXXY MAINTENANCE PACK) 4 MCG insert; Insert 1 capsule into the vagina 2 (Two) Times a Week.                 No Follow-up on file.      Edelmira Aviles MD    10/17/19  12:20 PM

## 2019-10-18 LAB
HBV SURFACE AG SERPL QL IA: NEGATIVE
HCV AB S/CO SERPL IA: 0.1 S/CO RATIO (ref 0–0.9)
HIV 1+2 AB+HIV1 P24 AG SERPL QL IA: NON REACTIVE
HSV1 IGG SER IA-ACNC: 28.8 INDEX (ref 0–0.9)
HSV2 IGG SER IA-ACNC: <0.91 INDEX (ref 0–0.9)
RPR SER QL: NON REACTIVE

## 2019-10-21 LAB
C TRACH RRNA SPEC QL NAA+PROBE: NEGATIVE
N GONORRHOEA RRNA SPEC QL NAA+PROBE: NEGATIVE
T VAGINALIS DNA SPEC QL NAA+PROBE: NEGATIVE

## 2020-01-10 RX ORDER — SUMATRIPTAN 100 MG/1
TABLET, FILM COATED ORAL
Qty: 9 TABLET | OUTPATIENT
Start: 2020-01-10

## 2020-01-31 RX ORDER — SUMATRIPTAN 100 MG/1
TABLET, FILM COATED ORAL
Qty: 9 TABLET | OUTPATIENT
Start: 2020-01-31

## 2020-02-21 ENCOUNTER — TELEPHONE (OUTPATIENT)
Dept: OBSTETRICS AND GYNECOLOGY | Facility: CLINIC | Age: 55
End: 2020-02-21

## 2020-02-21 NOTE — TELEPHONE ENCOUNTER
Patient called again just to make sure you understood she didn't have genetic testing done were you referred her to [facility] because she couldn't get in for two years so another dr sent her to [facility] in [facility]

## 2020-02-21 NOTE — TELEPHONE ENCOUNTER
PT STATED SHE HAD THE GENETIC TESTING OF JING ALEXANDRA AND HER INSURANCE COMPANY DENIED PAYING FOR IT.      SHE IS ASKING FOR AN APPEAL.    SHE NEEDS A LETTER OF APPEAL SENT TO HER INSURANCE COMPANY. CAN YOU CALL PATIENT WHEN YOU GET A CHANCE, OR SEND AN APPEAL LETTER TO HER INSURANCE COMPANY, OR PLEASE ADVISE ME ON WHAT TO DO NEXT . THANK YOU

## 2020-05-06 ENCOUNTER — TELEPHONE (OUTPATIENT)
Dept: OBSTETRICS AND GYNECOLOGY | Facility: CLINIC | Age: 55
End: 2020-05-06

## 2020-05-06 NOTE — TELEPHONE ENCOUNTER
[facility] drug store in [place] called and stated they dispensed the wrong prescription to the patient. They gave her Imvexxy 10mcg instead of imvexxy 4mcg. Pharmacist spoke to patient and states she is ok. Pharmacy is sending over all paperwork on this, and dispensing the correct prescription.

## 2020-10-27 ENCOUNTER — HOSPITAL ENCOUNTER (EMERGENCY)
Facility: HOSPITAL | Age: 55
Discharge: HOME | End: 2020-10-27
Attending: EMERGENCY MEDICINE | Admitting: EMERGENCY MEDICINE

## 2020-10-27 VITALS
OXYGEN SATURATION: 99 % | SYSTOLIC BLOOD PRESSURE: 136 MMHG | RESPIRATION RATE: 18 BRPM | DIASTOLIC BLOOD PRESSURE: 110 MMHG | HEART RATE: 96 BPM | TEMPERATURE: 97.3 F | BODY MASS INDEX: 21.7 KG/M2 | WEIGHT: 147 LBS

## 2020-10-27 DIAGNOSIS — H02.401 PTOSIS OF RIGHT EYELID: Primary | ICD-10-CM

## 2020-10-27 PROCEDURE — 99282 EMERGENCY DEPT VISIT SF MDM: CPT | Performed by: EMERGENCY MEDICINE

## 2020-10-27 PROCEDURE — 99282 EMERGENCY DEPT VISIT SF MDM: CPT

## 2020-10-27 RX ORDER — ESTRADIOL 4 UG/1
1 INSERT VAGINAL 2 TIMES WEEKLY
Qty: 8 EACH | Refills: 3 | Status: SHIPPED | OUTPATIENT
Start: 2020-10-29

## 2020-10-27 RX ORDER — IBUPROFEN 800 MG/1
800 TABLET ORAL EVERY 6 HOURS PRN
COMMUNITY
End: 2022-07-20

## 2021-04-19 ENCOUNTER — OFFICE VISIT (OUTPATIENT)
Dept: OBSTETRICS AND GYNECOLOGY | Facility: CLINIC | Age: 56
End: 2021-04-19

## 2021-04-19 VITALS
DIASTOLIC BLOOD PRESSURE: 88 MMHG | BODY MASS INDEX: 25.72 KG/M2 | WEIGHT: 154.4 LBS | SYSTOLIC BLOOD PRESSURE: 134 MMHG | HEIGHT: 65 IN

## 2021-04-19 DIAGNOSIS — Z01.419 ROUTINE GYNECOLOGICAL EXAMINATION: ICD-10-CM

## 2021-04-19 DIAGNOSIS — Z01.419 PAP SMEAR, LOW-RISK: ICD-10-CM

## 2021-04-19 DIAGNOSIS — Z11.51 ENCOUNTER FOR SCREENING FOR HUMAN PAPILLOMAVIRUS (HPV): Primary | ICD-10-CM

## 2021-04-19 DIAGNOSIS — Z11.3 SCREEN FOR STD (SEXUALLY TRANSMITTED DISEASE): ICD-10-CM

## 2021-04-19 PROCEDURE — 99396 PREV VISIT EST AGE 40-64: CPT | Performed by: OBSTETRICS & GYNECOLOGY

## 2021-04-19 PROCEDURE — 81002 URINALYSIS NONAUTO W/O SCOPE: CPT | Performed by: OBSTETRICS & GYNECOLOGY

## 2021-04-19 RX ORDER — DIPHENOXYLATE HYDROCHLORIDE AND ATROPINE SULFATE 2.5; .025 MG/1; MG/1
1 TABLET ORAL DAILY
COMMUNITY

## 2021-04-19 RX ORDER — ZOLMITRIPTAN 5 MG/1
SPRAY NASAL
COMMUNITY
Start: 2020-11-05

## 2021-04-20 LAB
HBV SURFACE AG SERPL QL IA: NEGATIVE
HCV AB S/CO SERPL IA: <0.1 S/CO RATIO (ref 0–0.9)
HIV 1+2 AB+HIV1 P24 AG SERPL QL IA: NON REACTIVE
HSV1 IGG SER IA-ACNC: 31.6 INDEX (ref 0–0.9)
HSV2 IGG SER IA-ACNC: <0.91 INDEX (ref 0–0.9)
RPR SER QL: NON REACTIVE

## 2021-04-21 LAB
C TRACH RRNA CVX QL NAA+PROBE: NEGATIVE
CYTOLOGIST CVX/VAG CYTO: NORMAL
CYTOLOGY CVX/VAG DOC CYTO: NORMAL
CYTOLOGY CVX/VAG DOC THIN PREP: NORMAL
DX ICD CODE: NORMAL
HIV 1 & 2 AB SER-IMP: NORMAL
HPV I/H RISK 4 DNA CVX QL PROBE+SIG AMP: NEGATIVE
N GONORRHOEA RRNA CVX QL NAA+PROBE: NEGATIVE
OTHER STN SPEC: NORMAL
STAT OF ADQ CVX/VAG CYTO-IMP: NORMAL
T VAGINALIS RRNA SPEC QL NAA+PROBE: NEGATIVE

## 2022-01-27 ENCOUNTER — OFFICE VISIT (OUTPATIENT)
Dept: ORTHOPEDIC SURGERY | Facility: CLINIC | Age: 57
End: 2022-01-27

## 2022-01-27 VITALS — HEIGHT: 65 IN | WEIGHT: 154 LBS | BODY MASS INDEX: 25.66 KG/M2 | TEMPERATURE: 96.8 F

## 2022-01-27 DIAGNOSIS — M20.11 HALLUX VALGUS OF RIGHT FOOT: ICD-10-CM

## 2022-01-27 DIAGNOSIS — M79.672 BILATERAL FOOT PAIN: ICD-10-CM

## 2022-01-27 DIAGNOSIS — M92.71 FREIBERG'S INFRACTION, RIGHT: ICD-10-CM

## 2022-01-27 DIAGNOSIS — M79.671 BILATERAL FOOT PAIN: ICD-10-CM

## 2022-01-27 DIAGNOSIS — M19.072 ARTHRITIS OF FIRST METATARSOPHALANGEAL (MTP) JOINT OF LEFT FOOT: ICD-10-CM

## 2022-01-27 DIAGNOSIS — M19.071 ARTHRITIS OF FIRST METATARSOPHALANGEAL (MTP) JOINT OF RIGHT FOOT: Primary | ICD-10-CM

## 2022-01-27 DIAGNOSIS — M20.41 HAMMER TOE OF RIGHT FOOT: ICD-10-CM

## 2022-01-27 PROCEDURE — 99204 OFFICE O/P NEW MOD 45 MIN: CPT | Performed by: ORTHOPAEDIC SURGERY

## 2022-01-27 PROCEDURE — 73630 X-RAY EXAM OF FOOT: CPT | Performed by: ORTHOPAEDIC SURGERY

## 2022-01-27 RX ORDER — KETOROLAC TROMETHAMINE 10 MG/1
TABLET, FILM COATED ORAL
COMMUNITY
Start: 2021-11-30

## 2022-01-27 RX ORDER — FLUTICASONE PROPIONATE 50 MCG
SPRAY, SUSPENSION (ML) NASAL
COMMUNITY
End: 2022-07-20

## 2022-01-27 RX ORDER — DICLOFENAC SODIUM 75 MG/1
TABLET, DELAYED RELEASE ORAL
COMMUNITY
Start: 2022-01-11

## 2022-01-27 NOTE — PROGRESS NOTES
"   New Patient Complaint      Patient: Cinthya Ayoub  YOB: 1965 56 y.o. female  Medical Record Number: [id number]    Chief Complaints: Toes hurt    History of Present Illness:   Patient reports a several year history of mild intermittent aching pain around the first MTP joints bilaterally fairly symmetrically mainly over the dorsal medial and somewhat lateral aspect on the left more so than right.  This does interfere some with shoe wear but does not really keep her from daily activities.    She also reports that she broke her right second toe several years ago has had some persistent deformity to that with some intermittent achiness around the PIP joint and MTP joint.    She does have some mild decrease sensation in her feet and hands secondary to previous chemotherapy for breast cancer.    She is seen at the request of   ARNOLDO Dutton who has requested my opinion regarding etiology and treatment of this condition       HPI    Allergies:   Allergies   Allergen Reactions   • Ciprofloxacin Anaphylaxis and Rash     Low b/p  Other reaction(s): Bradycardia, HYPOTENSION  Low b/p, O2, lost consciousness     • Codeine Nausea And Vomiting, Confusion and Rash     Felt hyperactive and out of head  Other reaction(s): Hyperactive  \"Felt hyperactive and out of head, jittery\"     • Levofloxacin Rash and Other (See Comments)     Low b/p   Lost concious    Other reaction(s): HYPOTENSION  Lost concious  Low b/p, during chemo  Lost concious  Lost concious     • Oxycodone-Acetaminophen Other (See Comments)     Makes patient cry and could not eat  Makes patient cry and could not eat  Makes patient cry and could not eat     • Levaquin [Levofloxacin In D5w] Unknown (See Comments)     Lost concious       Medications:   Current Outpatient Medications on File Prior to Visit   Medication Sig   • anastrozole (ARIMIDEX) 1 MG tablet Take 1 mg by mouth Daily.   • ascorbic acid (VITAMIN C) 100 MG tablet Take 100 mg by " mouth Daily.   • B Complex Vitamins (VITAMIN B COMPLEX) capsule capsule Take  by mouth Daily.   • betamethasone dipropionate (DIPROLENE) 0.05 % ointment by Intratympanic route.   • Cholecalciferol (VITAMIN D) 1000 units tablet Take 2,000 Units by mouth Daily.   • cyclobenzaprine (FLEXERIL) 10 MG tablet Take 10 mg by mouth.   • diclofenac (VOLTAREN) 75 MG EC tablet TAKE ONE TABLET BY MOUTH TWICE DAILY AS NEEDED for pain Do not crush,chew, or split   • diphenhydrAMINE (SOMINEX) 25 MG tablet Take 25 mg by mouth.   • DULoxetine (CYMBALTA) 30 MG capsule Take 30 mg by mouth Daily.   • Estradiol (Imvexxy Maintenance Pack) 4 MCG insert Insert 1 capsule into the vagina 2 (Two) Times a Week.   • fluorouracil (EFUDEX) 5 % cream by Intratympanic route.   • fluticasone (FLONASE) 50 MCG/ACT nasal spray fluticasone propionate 50 mcg/actuation nasal spray,suspension   USE TWO SPRAYS TWICE DAILY FOR ONE WEEK THEN reduce TO ONCE APPLY DAY   • gabapentin (NEURONTIN) 600 MG tablet TAKE ONE TABLET BY MOUTH EVERY SIX HOURS.   • hydrOXYzine (ATARAX) 25 MG tablet as needed   • ibuprofen (ADVIL,MOTRIN) 800 MG tablet Take 800 mg by mouth Every 6 (Six) Hours As Needed for Mild Pain .   • ketorolac (TORADOL) 10 MG tablet TAKE ONE TABLET BY MOUTH every 8 hours AS NEEDED FOR moderate migraine pain FOR UP TO THREE DAYS   • levothyroxine (SYNTHROID, LEVOTHROID) 137 MCG tablet Take 112 mcg by mouth Daily.   • methocarbamol (ROBAXIN) 500 MG tablet Take 750 mg by mouth 3 (Three) Times a Day.   • Multiple Vitamins-Minerals (MULTIVITAMIN WITH MINERALS) tablet tablet Take 1 tablet by mouth Daily.   • multivitamin (THERAGRAN) tablet tablet Take 1 tablet by mouth Daily.   • oxaprozin (DAYPRO) 600 MG tablet Take 1,200 mg by mouth Daily.   • prednisoLONE acetate (PRED FORTE) 1 % ophthalmic suspension    • promethazine (PHENERGAN) 25 MG tablet Take 25 mg by mouth.   • SUMAtriptan (IMITREX) 100 MG tablet Take 100 mg by mouth.   • tinidazole (TINDAMAX) 500 MG  tablet One tablet by mouth twice a day for 7 days   • tobramycin 0.3 % solution ophthalmic solution    • VITAMIN D, CHOLECALCIFEROL, PO Take  by mouth.   • ZOLMitriptan (Zomig) 5 MG nasal solution Zomig 5 mg nasal spray   USE ONE SPRAY in nostril AT ONSET of HEADACHE. MAY REPEAT in ONE hour if needed. max DOSE TWO PER DAY OR FOUR PER WEEK   • [DISCONTINUED] HYDROcodone-acetaminophen (NORCO) 5-325 MG per tablet Take 1 tablet by mouth Every 6 (Six) Hours As Needed.     No current facility-administered medications on file prior to visit.       Past Medical History:   Diagnosis Date   • Abnormal Pap smear of cervix    • Breast cancer metastasized to axillary lymph node, right (HCC) 03/07/2014   • Cervical dysplasia     S/P LEEP, continued abnl, TLH   • Colon polyp    • Herpes     HSV 1 by blood   • Low back pain    • Peripheral neuropathy    • Thyroid cancer (HCC)     h/o thyroid cancer   • Urogenital trichomoniasis      Past Surgical History:   Procedure Laterality Date   • APPENDECTOMY     • BREAST BIOPSY     • CERVICAL BIOPSY  W/ LOOP ELECTRODE EXCISION     • CERVICAL SPINE SURGERY     • COLONOSCOPY N/A 8/17/2018    Procedure: COLONOSCOPY, polypectomy;  Surgeon: Danielle Morelos MD;  Location: [facility];  Service: Gastroenterology   • HYSTERECTOMY      TLH/USO dysplasia   • HYSTEROSCOPY  2008   • KNEE ARTHROSCOPY Left 1982   • MASTECTOMY      B mastectomy with TRAM flap reconstruction   • OOPHORECTOMY      lsc BSO by gyn onc   • OTHER SURGICAL HISTORY      lymph node transplant   • TOTAL THYROIDECTOMY      follicular cancer     Social History     Occupational History   • Occupation: ELECTED PROSECUTOR,    Tobacco Use   • Smoking status: Never Smoker   • Smokeless tobacco: Never Used   Substance and Sexual Activity   • Alcohol use: Yes     Comment: socially    • Drug use: No   • Sexual activity: Yes     Partners: Male     Birth control/protection: Surgical, Post-menopausal     Comment: hysterectomy      Social  "History     Social History Narrative    LIVES ALONE     Family History   Problem Relation Age of Onset   • Colon cancer Father 74   • No Known Problems Mother    • No Known Problems Brother    • Breast cancer Maternal Great-Grandmother    • Ovarian cancer Neg Hx    • Deep vein thrombosis Neg Hx    • Pulmonary embolism Neg Hx        Review of Systems: 14 point review of systems performed, positive pertinent findings identified in HPI. All remaining systems negative     Review of Systems      Physical Exam:   Vitals:    01/27/22 0859   Temp: 96.8 °F (36 °C)   Weight: 69.9 kg (154 lb)   Height: 165.1 cm (65\")     Physical Exam   Constitutional: pleasant, well developed She is in slip on rubber type sandals with a 2 to 3 inch heel  Eyes: sclera non icteric  Hearing : adequate for exam  Cardiovascular: palpable pulses in bilaeral feet, bilateral calves/ thighs NT without sign of DVT  Respiratoy: breathing unlabored   Neurological: grossly sensate to LT throughout bilateral LEs  Psychiatric: oriented with normal mood and affect.   Lymphatic: No palpable popliteal lymphadenopathy bilateral LEs  Skin: intact throughout bilateral legs/feet  Musculoskeletal: Right foot shows mild clinical hallux valgus deformity with about 50 degrees dorsiflexion 20 to 30 degrees plantarflexion with slight discomfort of the medial eminence and mild discomfort with range of motion especially the upper limits.  She was nontender about the first TMT joint and did not demonstrate hypermobility.    There was some mild deformity around the PIP joint of the second toe and somewhat limited motion at the MTP P joint without gross instability but slight discomfort.    Left foot showed prominent bony eminence dorsomedially with discomfort to palpation over this area and limited motion at 40 degrees dorsiflexion 20 great plantarflexion with mild discomfort.  Physical Exam  Ortho Exam    Radiology: 3 views of both feet ordered evaluate pain and alignment " reviewed and no prior x-rays available for comparison.    Right foot shows mild to moderate hallux valgus deformity with some probable arthritic change of the first MTP joint and questionable cystic area over the medial head.  The CHINTAN measures around 14.8 and HVA around 30.  There is chronic appearing morphologic changes over the distal second metatarsal head with some questionable cystic change and spurring as well as flattening.  There is some valgus alignment to the PIP joint of the second toe    Left foot demonstrates hallux valgus deformity with moderate to severe arthritic change of the first MTP joint.    Assessment/Plan: Bilateral hallux valgus with first MTP arthritis left more so than right  2.  Right second metatarsal head possible osteonecrosis or Freiberg's infraction with history of previous fracture  3.  Right second PIP joint pain with slight clinical deformity    We reviewed treatment options going forward and she not able to do anything from a surgical standpoint as she is going on a trip to [place] from February 3 through March 8 and then leaving to go on another trip overseas from April 16 to May 8.    Symptoms not particularly all that bothersome but do interfere with shoe wear and what I think we need to do is to get an MRI and CT scan of her right foot as it is the most symptomatic to determine the bony morphology of the second metatarsal head for preoperative planning as well as determine degree of arthrosis at the second and first MTP joints first assistance with preoperative planning and recommendations going forward.    For now we will have her use a silicone spacer between her first and second toes as this seems to be somewhat helpful to her in the past and she was also instructed on obtaining silicone bunion sleeves for both sides.    We will see her back in about 6 weeks to review treatment options and determine treatment course going forward.

## 2022-03-09 ENCOUNTER — HOSPITAL ENCOUNTER (OUTPATIENT)
Dept: CT IMAGING | Facility: HOSPITAL | Age: 57
Discharge: HOME | End: 2022-03-09

## 2022-03-09 ENCOUNTER — HOSPITAL ENCOUNTER (OUTPATIENT)
Dept: MRI IMAGING | Facility: HOSPITAL | Age: 57
Discharge: HOME | End: 2022-03-09

## 2022-03-09 DIAGNOSIS — M92.71 FREIBERG'S INFRACTION, RIGHT: ICD-10-CM

## 2022-03-09 DIAGNOSIS — M19.071 ARTHRITIS OF FIRST METATARSOPHALANGEAL (MTP) JOINT OF RIGHT FOOT: ICD-10-CM

## 2022-03-09 PROCEDURE — 73700 CT LOWER EXTREMITY W/O DYE: CPT

## 2022-03-09 PROCEDURE — 76376 3D RENDER W/INTRP POSTPROCES: CPT

## 2022-03-09 PROCEDURE — 73718 MRI LOWER EXTREMITY W/O DYE: CPT

## 2022-03-10 ENCOUNTER — OFFICE VISIT (OUTPATIENT)
Dept: ORTHOPEDIC SURGERY | Facility: CLINIC | Age: 57
End: 2022-03-10

## 2022-03-10 VITALS — WEIGHT: 138.2 LBS | BODY MASS INDEX: 20.47 KG/M2 | TEMPERATURE: 96.3 F | HEIGHT: 69 IN

## 2022-03-10 DIAGNOSIS — M92.71 FREIBERG'S INFRACTION, RIGHT: ICD-10-CM

## 2022-03-10 DIAGNOSIS — M19.072 ARTHRITIS OF FIRST METATARSOPHALANGEAL (MTP) JOINT OF LEFT FOOT: ICD-10-CM

## 2022-03-10 DIAGNOSIS — M20.11 HALLUX VALGUS OF RIGHT FOOT: ICD-10-CM

## 2022-03-10 DIAGNOSIS — M20.41 HAMMER TOE OF RIGHT FOOT: ICD-10-CM

## 2022-03-10 DIAGNOSIS — M19.071 ARTHRITIS OF FIRST METATARSOPHALANGEAL (MTP) JOINT OF RIGHT FOOT: Primary | ICD-10-CM

## 2022-03-10 PROCEDURE — 99214 OFFICE O/P EST MOD 30 MIN: CPT | Performed by: ORTHOPAEDIC SURGERY

## 2022-03-11 NOTE — PROGRESS NOTES
Foot Follow Up      Patient: Cinthya Ayoub    YOB: 1965 56 y.o. female    Chief Complaints: Feet feel okay    History of Present Illness: Patient was seen on 1/27/2022 with good several year history of mild intermittent aching pain around the first MTP joints bilaterally fairly symmetrically mainly over the dorsal medial and somewhat lateral aspect on the left more so than right.    This did interfere some with shoe wear but did not really keep her from daily activities.     She also reported that she broke her right second toe several years ago had had some persistent deformity to that with some intermittent achiness around the PIP joint and MTP joint.     She did have some mild decrease sensation in her feet and hands secondary to previous chemotherapy for breast cancer.     We had discussed treatment options that time and symptoms were not particularly bothersome to her but did interfere with shoe wear.  She was sent for MRI and CT scan of her right foot as that was the most symptomatic in order to help determine the degree of arthrosis of the first and second MTP joints.    She was instructed on use of silicone spacers and on obtaining bunion silicone sleeves.    She is seen back today reporting that she is really not having any pain in her toes.  It does interfere with shoe wear but does not keep her from doing anything [place] up to 10 miles a day and had no pain in her feet.  HPI    ROS: No foot pain  Past Medical History:   Diagnosis Date   • Abnormal Pap smear of cervix    • Breast cancer metastasized to axillary lymph node, right (HCC) 03/07/2014   • Cervical dysplasia     S/P LEEP, continued abnl, TLH   • Colon polyp    • Herpes     HSV 1 by blood   • Low back pain    • Peripheral neuropathy    • Thyroid cancer (HCC)     h/o thyroid cancer   • Urogenital trichomoniasis      Physical Exam:   Vitals:    03/10/22 0853   Temp: 96.3  "°F (35.7 °C)   Weight: 62.7 kg (138 lb 3.2 oz)   Height: 174 cm (68.5\")     Well developed with normal mood.  On exam she has mild to moderate clinical hallux valgus deformity on the right with about 50 degrees dorsiflexion 20 to 30 degrees plantarflexion without discomfort to range of motion today or over the medial eminence.  She was nontender about the first TMT joint with no hypermobility.  There remains some deformity to the second PIP joint but no discomfort although limited motion at the second MTP joint.    Left foot showed palpable arthritic deformity with 40 degrees dorsiflexion and 20 degrees plantarflexion but without significant pain.      Radiology: CT scan films and report of the right foot dated 3/9/2022 reviewed which show significant arthritic change of the first and second metatarsal phalangeal joints with osseous remodeling and deformity at the at the metatarsal head articular surface and base of the second proximal phalanx.  There is some subcortical cyst from the medial edge of the first metatarsal head and medial sesamoid and central portion of the second metatarsal head.    There is flattening of the second metatarsal head and ossification dorsal to the second metatarsal head which may be intra-articular.    MRI films and report of the right foot reviewed and have been correlated with previous MRI from 10/21/2016.    There has been progressive degenerative change of the first MTP joint compared to previous MRI in between the metatarsal head and sesamoids.  There is subcortical cystic change and marrow edema along medial aspect the metatarsal head but the marrow edema seen previously in the medial great toe sesamoid was no longer present but there was some subcortical cystic change and minimal synovial thickening observed at that joint.    There is progressive arthropathy of the second MTP joint with more extensive articular surface remodeling and marginal osteophyte.      Assessment/Plan: 1.  " Right hallux valgus with first MTP arthritis  2.  Right second MTP arthritis with deformity of second metatarsal head  3.  Left first MTP arthritis.    We discussed treatment going forward she does not desire anything done from a surgical standpoint.    Her left foot she would definitely need a fusion for the right foot given the arthritic change that she has I would recommend a fusion but she is not having any significant pain and if symptoms were bad enough we could consider bunion correction with the understanding that she could eventually need a fusion.    She would need debridement of the right second metatarsal head and PIP joint fusion as well.    Regardless she is not interested in any surgical treatment at this time and I will having significant symptoms.    She will continue with use of silicone spacers and/or bunion sleeves and accommodative shoes.    If anything worsens to the point that she wishes to proceed with surgical treatment she will let me know (we are related and she has my number).  Otherwise I will see her back as needed.

## 2022-07-18 ENCOUNTER — APPOINTMENT (OUTPATIENT)
Dept: GENERAL RADIOLOGY | Facility: HOSPITAL | Age: 57
End: 2022-07-18

## 2022-07-18 ENCOUNTER — HOSPITAL ENCOUNTER (EMERGENCY)
Facility: HOSPITAL | Age: 57
Discharge: HOME | End: 2022-07-18
Attending: EMERGENCY MEDICINE | Admitting: EMERGENCY MEDICINE

## 2022-07-18 VITALS
WEIGHT: 134 LBS | RESPIRATION RATE: 18 BRPM | TEMPERATURE: 98.4 F | SYSTOLIC BLOOD PRESSURE: 145 MMHG | HEIGHT: 68 IN | BODY MASS INDEX: 20.31 KG/M2 | HEART RATE: 79 BPM | DIASTOLIC BLOOD PRESSURE: 89 MMHG | OXYGEN SATURATION: 98 %

## 2022-07-18 DIAGNOSIS — L03.113 CELLULITIS OF RIGHT UPPER EXTREMITY: ICD-10-CM
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
## 2022-07-18 DIAGNOSIS — S61.451A DOG BITE OF RIGHT HAND, INITIAL ENCOUNTER: Primary | ICD-10-CM

## 2022-07-18 PROCEDURE — 96365 THER/PROPH/DIAG IV INF INIT: CPT

## 2022-07-18 PROCEDURE — 25010000002 TETANUS-DIPHTH-ACELL PERTUSSIS 5-2.5-18.5 LF-MCG/0.5 SUSPENSION PREFILLED SYRINGE: Performed by: EMERGENCY MEDICINE

## 2022-07-18 PROCEDURE — 96366 THER/PROPH/DIAG IV INF ADDON: CPT

## 2022-07-18 PROCEDURE — 90472 IMMUNIZATION ADMIN EACH ADD: CPT

## 2022-07-18 PROCEDURE — 90471 IMMUNIZATION ADMIN: CPT | Performed by: EMERGENCY MEDICINE

## 2022-07-18 PROCEDURE — 90375 RABIES IG IM/SC: CPT | Performed by: EMERGENCY MEDICINE

## 2022-07-18 PROCEDURE — 90715 TDAP VACCINE 7 YRS/> IM: CPT | Performed by: EMERGENCY MEDICINE

## 2022-07-18 PROCEDURE — 96372 THER/PROPH/DIAG INJ SC/IM: CPT

## 2022-07-18 PROCEDURE — 99282 EMERGENCY DEPT VISIT SF MDM: CPT

## 2022-07-18 PROCEDURE — 25010000002 AMPICILLIN-SULBACTAM PER 1.5 G: Performed by: EMERGENCY MEDICINE

## 2022-07-18 PROCEDURE — 25010000002 RABIES IMMUNE GLOBULIN PER 150 INT'L UNITS: Performed by: EMERGENCY MEDICINE

## 2022-07-18 PROCEDURE — 90675 RABIES VACCINE IM: CPT | Performed by: EMERGENCY MEDICINE

## 2022-07-18 PROCEDURE — 73130 X-RAY EXAM OF HAND: CPT

## 2022-07-18 PROCEDURE — 25010000002 RABIES VACCINE PER 1 ML: Performed by: EMERGENCY MEDICINE

## 2022-07-18 RX ORDER — ONDANSETRON 4 MG/1
4 TABLET, ORALLY DISINTEGRATING ORAL EVERY 6 HOURS PRN
Qty: 20 TABLET | Refills: 0 | Status: SHIPPED | OUTPATIENT
Start: 2022-07-18

## 2022-07-18 RX ORDER — AMOXICILLIN AND CLAVULANATE POTASSIUM 500; 125 MG/1; MG/1
1 TABLET, FILM COATED ORAL 3 TIMES DAILY
Qty: 21 TABLET | Refills: 0 | Status: SHIPPED | OUTPATIENT
Start: 2022-07-19 | End: 2022-07-26

## 2022-07-18 RX ORDER — SODIUM CHLORIDE 0.9 % (FLUSH) 0.9 %
10 SYRINGE (ML) INJECTION AS NEEDED
Status: DISCONTINUED | OUTPATIENT
Start: 2022-07-18 | End: 2022-07-18 | Stop reason: HOSPADM

## 2022-07-18 RX ORDER — SACCHAROMYCES BOULARDII 250 MG
250 CAPSULE ORAL 2 TIMES DAILY
Qty: 28 CAPSULE | Refills: 0 | Status: SHIPPED | OUTPATIENT
Start: 2022-07-18 | End: 2022-08-01

## 2022-07-18 RX ADMIN — RABIES IMMUNE GLOBULIN (HUMAN) 1215 UNITS: 300 INJECTION, SOLUTION INFILTRATION; INTRAMUSCULAR at 16:01

## 2022-07-18 RX ADMIN — TETANUS TOXOID, REDUCED DIPHTHERIA TOXOID AND ACELLULAR PERTUSSIS VACCINE, ADSORBED 0.5 ML: 5; 2.5; 8; 8; 2.5 SUSPENSION INTRAMUSCULAR at 15:05

## 2022-07-18 RX ADMIN — SODIUM CHLORIDE 3 G: 900 INJECTION INTRAVENOUS at 15:09

## 2022-07-18 RX ADMIN — Medication 2.5 UNITS: at 15:43

## 2022-07-18 NOTE — DISCHARGE INSTRUCTIONS
Medication as directed.  Wash wound 3 times daily with antibacterial soap.  Pat dry and apply bacitracin or Neosporin.  Continue to healed.  Elevate right upper extremity to heart level is much as possible.  This will increase blood flow and speed up healing.  You have been given rabies antiglobulin given the first rabies vaccine today.  You are to be given additional doses of rabies vaccine on days 3, 7, 14 and 28.  Day 3 is on Wednesday, 7/20/2022.  These are to be given in the [facility].  Return to the hospital as discussed.  Follow-up with your PCP in 2 days for wound check, sooner if needed.  Return to ED for worsening symptoms, medical emergencies.

## 2022-07-18 NOTE — ED PROVIDER NOTES
Subjective   Cinthya Ayoub is a 56-year-old white female who presents secondary to concerns after dog bite.  Patient is caring for her friend's cat.  Last night the neighbors dogs were attacking the cat.  Ms.  Open the door of her home and the right hand.  She and applying antibiotic cream.  This morning patient noted redness and swelling in her hand and forearm.  Ms. Edwards is status post bilateral mastectomy in 2014.  She had lymph nodes removed from her right axilla.  She called her oncologist at [facility].  As the rabies status of the dog was her unknown they recommended patient come to the ED for rabies injections.  Patient also concerned about cellulitis.  Thus she presents for evaluation.    Patient is a .  Her family members includes Roslyn Ayoub.     Patient is vaccinated and boosted for COVID-19.      History provided by:  Patient      Review of Systems   Constitutional: Negative.  Negative for fever.   HENT: Negative.  Negative for rhinorrhea.    Eyes: Negative.  Negative for redness.   Respiratory: Negative for cough.    Cardiovascular: Negative for chest pain.   Gastrointestinal: Negative for abdominal pain.   Endocrine: Negative.    Genitourinary: Negative.  Negative for difficulty urinating.   Musculoskeletal: Negative.  Negative for back pain.   Skin: Positive for color change.        Erythema right forearm   Neurological: Negative.  Negative for syncope.   Hematological: Negative.    Psychiatric/Behavioral: Negative.    All other systems reviewed and are negative.      Past Medical History:   Diagnosis Date   • Abnormal Pap smear of cervix    • Breast cancer metastasized to axillary lymph node, right (HCC) 03/07/2014   • Cervical dysplasia     S/P LEEP, continued abnl, TLH   • Colon polyp    • Herpes     HSV 1 by blood   • Low back pain    • Peripheral neuropathy    • Thyroid cancer (HCC)     h/o thyroid cancer   • Urogenital trichomoniasis        Allergies   Allergen  "Reactions   • Ciprofloxacin Anaphylaxis and Rash     Low b/p  Other reaction(s): Bradycardia, HYPOTENSION  Low b/p, O2, lost consciousness     • Codeine Nausea And Vomiting, Confusion and Rash     Felt hyperactive and out of head  Other reaction(s): Hyperactive  \"Felt hyperactive and out of head, jittery\"     • Levofloxacin Rash and Other (See Comments)     Low b/p   Lost concious    Other reaction(s): HYPOTENSION  Lost concious  Low b/p, during chemo  Lost concious  Lost concious     • Oxycodone-Acetaminophen Other (See Comments)     Makes patient cry and could not eat  Makes patient cry and could not eat  Makes patient cry and could not eat     • Levaquin [Levofloxacin In D5w] Unknown (See Comments)     Lost concious       Past Surgical History:   Procedure Laterality Date   • APPENDECTOMY     • BREAST BIOPSY     • CERVICAL BIOPSY  W/ LOOP ELECTRODE EXCISION     • CERVICAL SPINE SURGERY     • COLONOSCOPY N/A 8/17/2018    Procedure: COLONOSCOPY, polypectomy;  Surgeon: Danielle Morelos MD;  Location: [facility];  Service: Gastroenterology   • HYSTERECTOMY      TLH/USO dysplasia   • HYSTEROSCOPY  2008   • KNEE ARTHROSCOPY Left 1982   • MASTECTOMY      B mastectomy with TRAM flap reconstruction   • OOPHORECTOMY      lsc BSO by gyn onc   • OTHER SURGICAL HISTORY      lymph node transplant   • TOTAL THYROIDECTOMY      follicular cancer       Family History   Problem Relation Age of Onset   • Colon cancer Father 74   • No Known Problems Mother    • No Known Problems Brother    • Breast cancer Maternal Great-Grandmother    • Ovarian cancer Neg Hx    • Deep vein thrombosis Neg Hx    • Pulmonary embolism Neg Hx        Social History     Socioeconomic History   • Marital status:    • Number of children: 0   • Years of education: DOCTORAL   Tobacco Use   • Smoking status: Never Smoker   • Smokeless tobacco: Never Used   Substance and Sexual Activity   • Alcohol use: Yes     Comment: socially    • Drug use: No   • Sexual " activity: Yes     Partners: Male     Birth control/protection: Surgical, Post-menopausal     Comment: hysterectomy           Objective   Physical Exam  Vitals and nursing note reviewed.   Constitutional:       General: She is not in acute distress.     Appearance: Normal appearance. She is normal weight. She is not ill-appearing, toxic-appearing or diaphoretic.      Comments: 56-year-old white female sitting in chair.  Patient appears in good overall health.  Vital signs notable for blood pressure 160/89.  Patient is friendly, very jovial and cooperative.   Musculoskeletal:         General: Swelling and signs of injury present.      Right elbow: Normal.      Right forearm: Swelling present. No tenderness or bony tenderness.      Right wrist: Swelling present. No tenderness, bony tenderness or crepitus. Normal range of motion.      Left hand: Swelling present. No tenderness or bony tenderness. Normal strength. Normal sensation. Normal pulse.        Arms:         Hands:    Skin:     General: Skin is warm and dry.      Capillary Refill: Capillary refill takes less than 2 seconds.      Findings: Erythema present.   Neurological:      General: No focal deficit present.      Mental Status: She is alert and oriented to person, place, and time.   Psychiatric:         Mood and Affect: Mood normal.         Behavior: Behavior normal.         Procedures           ED Course  ED Course as of 07/18/22 1638   Mon Jul 18, 2022   1451 Updating tetanus.  Obtaining x-ray to rule out foreign body.  Will place IV and give first dose of antibiotics IV.  Starting rabies vaccine series as well as giving rabies immunoglobulin. [SS]   1619 X-ray is unremarkable.  Antibiotics infused.  Discussed at length with patient results, diagnoses, need for additional rabies vaccines on days 3, 7, 14 and 28.  Indications return to ED.  All questions answered.  Prescribing Augmentin for home.    Prescription  1-Augmentin  2-Zofran  3-probiotic. [SS]       ED Course User Index  [SS] Nikolay Guevara MD      My differential diagnosis of the upper extremity pain or injury includes but is not limited to contusions of the shoulder, forearm, arm, wrist, elbow or hand, dislocations of shoulder, elbow, wrist, digits, nursemaid's elbow (age-appropriate), shoulder sprain, elbow sprain, wrist sprain, digit sprain, shoulder strain, arm strain, forearm strain, elbow strain, wrist strain, hand sprain, digit strain, lacerations of the upper extremity, fractures both closed and open of clavicle, scapula, humerus, radius, ulna, bones of the wrist, hands and digits, cellulitis or abscess, cervical radiculopathy, radial nerve palsy, neurogenic upper extremity pain, angina equivalent, SVT, DVT, arterial occlusion, compartment syndrome.                                   MDM    Final diagnoses:   Dog bite of right hand, initial encounter   Cellulitis of right upper extremity       ED Disposition  ED Disposition     ED Disposition   Discharge    Condition   Stable    Comment   --             Lul Brandon MD  [address] [facility] [address] [phone]    Schedule an appointment as soon as possible for a visit in 2 days  For wound re-check, Sooner if needed    [address] [address] [phone]  Go in 2 days  To [facility] for second dose of rabies vaccine         Medication List      New Prescriptions    amoxicillin-clavulanate 500-125 MG per tablet  Commonly known as: Augmentin  Take 1 tablet by mouth 3 (Three) Times a Day for 7 days.  Start taking on: July 19, 2022     ondansetron ODT 4 MG disintegrating tablet  Commonly known as: Zofran ODT  Place 1 tablet on the tongue Every 6 (Six) Hours As Needed for Nausea or Vomiting for up to 20 doses.     saccharomyces boulardii 250 MG capsule  Commonly known as: FLORASTOR  Take 1 capsule by mouth 2 (Two) Times a Day for 14 days.           Where to Get Your Medications       These medications were sent to [facility] - [place], [place] - [address] [facility] [facility] - [phone]  - [phone] FX  [address] [facility] [address]    Phone: [phone]   · amoxicillin-clavulanate 500-125 MG per tablet  · ondansetron ODT 4 MG disintegrating tablet  · saccharomyces boulardii 250 MG capsule          Nikolay Guevara MD  07/18/22 6757

## 2022-07-18 NOTE — ED NOTES
Called [facility] spoke with Ivet, notified her of patient needing follow-up rabivert injections. Ivet stated she would call the patient tomorrow. Patient informed to expect call tomorrow.

## 2022-07-20 ENCOUNTER — HOSPITAL ENCOUNTER (OUTPATIENT)
Dept: INFUSION THERAPY | Facility: HOSPITAL | Age: 57
Discharge: HOME | End: 2022-07-20
Admitting: EMERGENCY MEDICINE

## 2022-07-20 VITALS
WEIGHT: 134.4 LBS | OXYGEN SATURATION: 100 % | RESPIRATION RATE: 16 BRPM | HEIGHT: 69 IN | DIASTOLIC BLOOD PRESSURE: 74 MMHG | HEART RATE: 96 BPM | BODY MASS INDEX: 19.91 KG/M2 | SYSTOLIC BLOOD PRESSURE: 134 MMHG | TEMPERATURE: 97.8 F

## 2022-07-20 DIAGNOSIS — S61.451A DOG BITE OF RIGHT HAND, INITIAL ENCOUNTER: ICD-10-CM
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
## 2022-07-20 DIAGNOSIS — T14.8XXA ANIMAL BITE: ICD-10-CM

## 2022-07-20 DIAGNOSIS — Z23 NEED FOR PROPHYLACTIC VACCINATION AND INOCULATION AGAINST RABIES: Primary | ICD-10-CM

## 2022-07-20 PROCEDURE — 96372 THER/PROPH/DIAG INJ SC/IM: CPT

## 2022-07-20 PROCEDURE — 90675 RABIES VACCINE IM: CPT | Performed by: EMERGENCY MEDICINE

## 2022-07-20 PROCEDURE — 25010000002 RABIES VACCINE PER 1 ML: Performed by: EMERGENCY MEDICINE

## 2022-07-20 RX ADMIN — RABIES VACCINE 2.5 UNITS: KIT at 13:45

## 2022-07-20 NOTE — PATIENT INSTRUCTIONS
"  Call  Dr Guevara [facility] [phone]  if you have any problems or concerns.    We know you have a Choice in healthcare and appreciate you using [facility].  Our purpose is to provide you \"Excellent Care\".  We hope that you will always choose us in the future and continue to recommend us to your family and friends.             "

## 2022-07-20 NOTE — NURSING NOTE
NURSE PROGRESS NOTE    PT. ARRIVED @ [facility] FOR SCHEDULED INJECTION AT 1300 .  INJECTION WAS PERFORMED WITHOUT INCIDENT.  PT. DISCHARGED TO HOME AT 1350.

## 2022-07-25 ENCOUNTER — HOSPITAL ENCOUNTER (OUTPATIENT)
Dept: INFUSION THERAPY | Facility: HOSPITAL | Age: 57
Discharge: HOME | End: 2022-07-25
Admitting: EMERGENCY MEDICINE

## 2022-07-25 VITALS
HEART RATE: 90 BPM | TEMPERATURE: 98.4 F | DIASTOLIC BLOOD PRESSURE: 73 MMHG | SYSTOLIC BLOOD PRESSURE: 111 MMHG | OXYGEN SATURATION: 99 % | RESPIRATION RATE: 16 BRPM
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
## 2022-07-25 DIAGNOSIS — S61.451A DOG BITE OF RIGHT HAND, INITIAL ENCOUNTER: ICD-10-CM

## 2022-07-25 DIAGNOSIS — Z23 NEED FOR PROPHYLACTIC VACCINATION AND INOCULATION AGAINST RABIES: Primary | ICD-10-CM

## 2022-07-25 PROCEDURE — 90675 RABIES VACCINE IM: CPT | Performed by: EMERGENCY MEDICINE

## 2022-07-25 PROCEDURE — 25010000002 RABIES VACCINE PER 1 ML: Performed by: EMERGENCY MEDICINE

## 2022-07-25 PROCEDURE — 96372 THER/PROPH/DIAG INJ SC/IM: CPT

## 2022-07-25 RX ADMIN — RABIES VACCINE 2.5 UNITS: KIT at 14:02

## 2022-07-25 NOTE — NURSING NOTE
NURSE PROGRESS NOTE    PT. ARRIVED @ [facility] FOR SCHEDULED INJECTION AT 1345 .  INJECTION WAS PERFORMED WITHOUT INCIDENT.  PT. DISCHARGED TO HOME AT 1406.

## 2022-08-01 ENCOUNTER — HOSPITAL ENCOUNTER (OUTPATIENT)
Dept: INFUSION THERAPY | Facility: HOSPITAL | Age: 57
Discharge: HOME | End: 2022-08-01
Admitting: EMERGENCY MEDICINE

## 2022-08-01 VITALS
SYSTOLIC BLOOD PRESSURE: 130 MMHG | OXYGEN SATURATION: 99 % | HEART RATE: 87 BPM | RESPIRATION RATE: 16 BRPM | DIASTOLIC BLOOD PRESSURE: 85 MMHG | TEMPERATURE: 97.4 F
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
## 2022-08-01 DIAGNOSIS — S61.451A DOG BITE OF RIGHT HAND, INITIAL ENCOUNTER: Primary | ICD-10-CM

## 2022-08-01 PROCEDURE — 96372 THER/PROPH/DIAG INJ SC/IM: CPT

## 2022-08-01 PROCEDURE — 90675 RABIES VACCINE IM: CPT | Performed by: EMERGENCY MEDICINE

## 2022-08-01 PROCEDURE — 25010000002 RABIES VACCINE PER 1 ML: Performed by: EMERGENCY MEDICINE

## 2022-08-01 RX ADMIN — Medication 2.5 UNITS: at 15:17

## 2022-08-01 NOTE — NURSING NOTE
Patient arrived at 1505 for scheduled [facility] appointment.  History and medications reviewed.  Medication administered as ordered without complications.  AVS refused by patient.  Patient discharged in stable condition without complaint ambulatory towards front lobby at 1520.

## 2022-08-15 ENCOUNTER — HOSPITAL ENCOUNTER (OUTPATIENT)
Dept: INFUSION THERAPY | Facility: HOSPITAL | Age: 57
Discharge: HOME | End: 2022-08-15
Admitting: EMERGENCY MEDICINE

## 2022-08-15 VITALS
TEMPERATURE: 98.2 F | BODY MASS INDEX: 20.17 KG/M2 | HEART RATE: 82 BPM | SYSTOLIC BLOOD PRESSURE: 122 MMHG | OXYGEN SATURATION: 97 % | DIASTOLIC BLOOD PRESSURE: 70 MMHG | RESPIRATION RATE: 16 BRPM | WEIGHT: 134.6 LBS

## 2022-08-15 DIAGNOSIS — S61.451A DOG BITE OF RIGHT HAND, INITIAL ENCOUNTER: Primary | ICD-10-CM

## 2022-08-15 DIAGNOSIS — Z23 NEED FOR PROPHYLACTIC VACCINATION AND INOCULATION AGAINST RABIES: ICD-10-CM
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
## 2022-08-15 PROCEDURE — 90471 IMMUNIZATION ADMIN: CPT

## 2022-08-15 PROCEDURE — 90675 RABIES VACCINE IM: CPT | Performed by: EMERGENCY MEDICINE

## 2022-08-15 PROCEDURE — 25010000002 RABIES VACCINE PER 1 ML: Performed by: EMERGENCY MEDICINE

## 2022-08-15 PROCEDURE — 96372 THER/PROPH/DIAG INJ SC/IM: CPT

## 2022-08-15 RX ADMIN — RABIES VACCINE 2.5 UNITS: KIT at 15:01

## 2022-08-15 NOTE — NURSING NOTE
NURSING PROGRESS NOTE:    PATIENT ARRIVED AMBULATORY TO [facility] AT 1445 FOR SCHEDULED INJECTION.  PROCEDURE PERFORMED WITHOUT INCIDENT.  DISCHARGED HOME AT 1510.  BAKARI GU

## 2023-12-13 ENCOUNTER — OFFICE VISIT (OUTPATIENT)
Dept: ORTHOPEDIC SURGERY | Facility: CLINIC | Age: 58
End: 2023-12-13
Payer: COMMERCIAL

## 2023-12-13 VITALS — TEMPERATURE: 98.4 F | HEIGHT: 69 IN | WEIGHT: 136 LBS | BODY MASS INDEX: 20.14 KG/M2

## 2023-12-13 DIAGNOSIS — R52 PAIN: ICD-10-CM

## 2023-12-13 DIAGNOSIS — M19.071 ARTHRITIS OF FIRST METATARSOPHALANGEAL (MTP) JOINT OF RIGHT FOOT: ICD-10-CM

## 2023-12-13 DIAGNOSIS — M20.41 HAMMER TOE OF RIGHT FOOT: ICD-10-CM

## 2023-12-13 DIAGNOSIS — M20.11 HALLUX VALGUS OF RIGHT FOOT: ICD-10-CM

## 2023-12-13 DIAGNOSIS — M92.71 FREIBERG'S INFRACTION, RIGHT: ICD-10-CM

## 2023-12-13 DIAGNOSIS — M19.072 ARTHRITIS OF FIRST METATARSOPHALANGEAL (MTP) JOINT OF LEFT FOOT: Primary | ICD-10-CM

## 2023-12-13 PROCEDURE — 99214 OFFICE O/P EST MOD 30 MIN: CPT | Performed by: ORTHOPAEDIC SURGERY

## 2023-12-13 NOTE — PROGRESS NOTES
"Foot Follow Up      Patient: Cinthya Ayoub    YOB: 1965 58 y.o. female    Chief Complaints: Toes hurt left more than right    History of Present Illness: Patient has a history of hallux valgus bilaterally with arthritis left much more so than right as well as second MTP arthritis and hammertoe    She was seen on 3/10/2022 and discussed surgical treatment with her but she will did not wish to proceed with them instructed use of accommodative shoes bunion sleeves silicone spacers etc.    Patient is seen back today with persistent worsening symptoms of bilateral first MTP joints and somewhat to the right second toe but mostly symptoms on the left.  Her symptoms preclude wearing any type of closed toed shoe and actually had quite a bit of swelling and some breakdown over the medial aspect of her left first MTP joint several weeks ago while wearing tighter shoes and doing a lot of walking in [place] which is now improving.  HPI    ROS: Foot pain  Past Medical History:   Diagnosis Date    Abnormal Pap smear of cervix     Breast cancer metastasized to axillary lymph node, right 03/07/2014    Cervical dysplasia     S/P LEEP, continued abnl, TLH    Colon polyp     Herpes     HSV 1 by blood    Low back pain     Peripheral neuropathy     Thyroid cancer     h/o thyroid cancer    Urogenital trichomoniasis      Physical Exam:   Vitals:    12/13/23 1104   Temp: 98.4 °F (36.9 °C)   Weight: 61.7 kg (136 lb)   Height: 174 cm (68.5\")   PainSc:   8     Well developed with normal mood.  On exam she has hallux valgus deformity on the left with limited motion of the first MTP joint with significant pain.  There is a very small scab over the medial first MTP joint that is nearly healed without sign of infection    Right foot shows hallux valgus again with pain on axial grind testing.  There is some mild hammering of the second toe with some callus formation medially and limited discomfort at the second MTP " joint          Radiology: 3 views of both feet ordered evaluate pain and alignment reviewed and compared to previous x-rays.  There has been some worsening of arthritis of the left first MTP joint with lateral subluxation.    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      Assessment/Plan:  1.  Right hallux valgus with first MTP arthritis  2.  Right second MTP arthritis with deformity of second metatarsal head  3.  Left first MTP arthritis with hallux valgus.    We discussed treatment going forward and left side is most symptomatic and definitely would require fusion with which she was in agreement.  Plan on correction of the left first metatarsophalangeal joint valgus and due to arthritis will require fusing left first MTP joint including calcaneal bone graft.    I reviewed the operative procedure and postoperative course with her in detail and that she would be nonweightbearing for at least 4 to 6 weeks may not be full weightbearing until least 8 to 10 weeks or possibly longer but this should help decompress her immediately and significantly improve her pain and allow for more accommodative shoe wear    She voiced clear understanding operative procedure and postoperative course with associated risk benefits potential outcomes and complications which can include but not limited to heart attack stroke death pneumonia infection bleeding damage to blood vessels nerves or tendons blood clots pulmonary embolism persistent or worsening pain stiffness malunion nonunion need for subsequent surgery and failure to return to presurgery and precondition levels of activity as well as wound healing complications and even partial loss of the  toe or foot and possible fracture or infection at bone graft harvest site    She was encouraged on getting a scooter    All of her questions were answered to her full satisfaction we will plan to proceed with this on outpatient basis at a mutually convenient time.  She was encouraged to call if she has any questions prior to surgery.

## 2024-01-11 ENCOUNTER — PRE-ADMISSION TESTING (OUTPATIENT)
Dept: PREADMISSION TESTING | Facility: HOSPITAL | Age: 59
End: 2024-01-11
Payer: COMMERCIAL

## 2024-01-11 VITALS
HEIGHT: 66 IN | RESPIRATION RATE: 18 BRPM | HEART RATE: 87 BPM | SYSTOLIC BLOOD PRESSURE: 144 MMHG | OXYGEN SATURATION: 98 % | BODY MASS INDEX: 22.34 KG/M2 | WEIGHT: 139 LBS | TEMPERATURE: 98.8 F | DIASTOLIC BLOOD PRESSURE: 92 MMHG

## 2024-01-11 LAB
ANION GAP SERPL CALCULATED.3IONS-SCNC: 10 MMOL/L (ref 5–15)
BUN SERPL-MCNC: 9 MG/DL (ref 6–20)
BUN/CREAT SERPL: 11.8 (ref 7–25)
CALCIUM SPEC-SCNC: 9.6 MG/DL (ref 8.6–10.5)
CHLORIDE SERPL-SCNC: 102 MMOL/L (ref 98–107)
CO2 SERPL-SCNC: 26 MMOL/L (ref 22–29)
CREAT SERPL-MCNC: 0.76 MG/DL (ref 0.57–1)
DEPRECATED RDW RBC AUTO: 40.4 FL (ref 37–54)
EGFRCR SERPLBLD CKD-EPI 2021: 91 ML/MIN/1.73
ERYTHROCYTE [DISTWIDTH] IN BLOOD BY AUTOMATED COUNT: 12.3 % (ref 12.3–15.4)
GLUCOSE SERPL-MCNC: 76 MG/DL (ref 65–99)
HCT VFR BLD AUTO: 39.7 % (ref 34–46.6)
HGB BLD-MCNC: 13.1 G/DL (ref 12–15.9)
MCH RBC QN AUTO: 29.4 PG (ref 26.6–33)
MCHC RBC AUTO-ENTMCNC: 33 G/DL (ref 31.5–35.7)
MCV RBC AUTO: 89.2 FL (ref 79–97)
PLATELET # BLD AUTO: 288 10*3/MM3 (ref 140–450)
PMV BLD AUTO: 9.8 FL (ref 6–12)
POTASSIUM SERPL-SCNC: 3.7 MMOL/L (ref 3.5–5.2)
RBC # BLD AUTO: 4.45 10*6/MM3 (ref 3.77–5.28)
SODIUM SERPL-SCNC: 138 MMOL/L (ref 136–145)
WBC NRBC COR # BLD AUTO: 5.63 10*3/MM3 (ref 3.4–10.8)

## 2024-01-11 PROCEDURE — 85027 COMPLETE CBC AUTOMATED: CPT

## 2024-01-11 PROCEDURE — 36415 COLL VENOUS BLD VENIPUNCTURE: CPT

## 2024-01-11 PROCEDURE — 80048 BASIC METABOLIC PNL TOTAL CA: CPT

## 2024-01-11 RX ORDER — CYPROHEPTADINE HYDROCHLORIDE 4 MG/1
4-8 TABLET ORAL 3 TIMES DAILY PRN
COMMUNITY

## 2024-01-11 NOTE — DISCHARGE INSTRUCTIONS
Take the following medications the morning of surgery: DULOXETINE AND LEVOTHYROXINE      If you are on prescription narcotic pain medication to control your pain you may also take that medication the morning of surgery.    General Instructions:  Do not eat solid food after midnight the night before surgery.  You may drink clear liquids day of surgery but must stop at least one hour before your hospital arrival time.  It is beneficial for you to have a clear drink that contains carbohydrates the day of surgery.  We suggest a 12 to 20 ounce bottle of Gatorade or Powerade for non-diabetic patients or a 12 to 20 ounce bottle of G2 or Powerade Zero for diabetic patients. (Pediatric patients, are not advised to drink a 12 to 20 ounce carbohydrate drink)    Clear liquids are liquids you can see through.  Nothing red in color.     Plain water                               Sports drinks  Sodas                                   Gelatin (Jell-O)  Fruit juices without pulp such as white grape juice and apple juice  Popsicles that contain no fruit or yogurt  Tea or coffee (no cream or milk added)  Gatorade / Powerade  G2 / Powerade Zero    Infants may have breast milk up to four hours before surgery.  Infants drinking formula may drink formula up to six hours before surgery.   Patients who avoid smoking, chewing tobacco and alcohol for 4 weeks prior to surgery have a reduced risk of post-operative complications.  Quit smoking as many days before surgery as you can.  Do not smoke, use chewing tobacco or drink alcohol the day of surgery.   If applicable bring your C-PAP/ BI-PAP machine in with you to preop day of surgery.  Bring any papers given to you in the doctor’s office.  Wear clean comfortable clothes.  Do not wear contact lenses, false eyelashes or make-up.  Bring a case for your glasses.   Bring crutches or walker if applicable.  Remove all piercings.  Leave jewelry and any other valuables at home.  Hair extensions with  metal clips must be removed prior to surgery.  The Pre-Admission Testing nurse will instruct you to bring medications if unable to obtain an accurate list in Pre-Admission Testing.        If you were given a blood bank ID arm band remember to bring it with you the day of surgery.    Preventing a Surgical Site Infection:  For 2 to 3 days before surgery, avoid shaving with a razor because the razor can irritate skin and make it easier to develop an infection.    Any areas of open skin can increase the risk of a post-operative wound infection by allowing bacteria to enter and travel throughout the body.  Notify your surgeon if you have any skin wounds / rashes even if it is not near the expected surgical site.  The area will need assessed to determine if surgery should be delayed until it is healed.  The night prior to surgery shower using a fresh bar of anti-bacterial soap (such as Dial) and clean washcloth.  Sleep in a clean bed with clean clothing.  Do not allow pets to sleep with you.  Shower on the morning of surgery using a fresh bar of anti-bacterial soap (such as Dial) and clean washcloth.  Dry with a clean towel and dress in clean clothing.  Ask your surgeon if you will be receiving antibiotics prior to surgery.  Make sure you, your family, and all healthcare providers clean their hands with soap and water or an alcohol based hand  before caring for you or your wound.    Day of surgery:  Your arrival time is approximately two hours before your scheduled surgery time.  Upon arrival, a Pre-op nurse and Anesthesiologist will review your health history, obtain vital signs, and answer questions you may have.  The only belongings needed at this time will be a list of your home medications and if applicable your C-PAP/BI-PAP machine.  A Pre-op nurse will start an IV and you may receive medication in preparation for surgery, including something to help you relax.     Please be aware that surgery does come  with discomfort.  We want to make every effort to control your discomfort so please discuss any uncontrolled symptoms with your nurse.   Your doctor will most likely have prescribed pain medications.      If you are going home after surgery you will receive individualized written care instructions before being discharged.  A responsible adult must drive you to and from the hospital on the day of your surgery and stay with you for 24 hours.  Discharge prescriptions can be filled by the hospital pharmacy during regular pharmacy hours.  If you are having surgery late in the day/evening your prescription may be e-prescribed to your pharmacy.  Please verify your pharmacy hours or chose a 24 hour pharmacy to avoid not having access to your prescription because your pharmacy has closed for the day.    If you are staying overnight following surgery, you will be transported to your hospital room following the recovery period.  [facility] has all private rooms.    If you have any questions please call Pre-Admission Testing at [phone].  Deductibles and co-payments are collected on the day of service. Please be prepared to pay the required co-pay, deductible or deposit on the day of service as defined by your plan.    Call your surgeon immediately if you experience any of the following symptoms:  Sore Throat  Shortness of Breath or difficulty breathing  Cough  Chills  Body soreness or muscle pain  Headache  Fever  New loss of taste or smell  Do not arrive for your surgery ill.  Your procedure will need to be rescheduled to another time.  You will need to call your physician before the day of surgery to avoid any unnecessary exposure to hospital staff as well as other patients.

## 2024-01-15 NOTE — H&P
"Patient: Cinthya Ayoub     YOB: 1965 58 y.o. female     Chief Complaints: Toes hurt left more than right     History of Present Illness: Patient has a history of hallux valgus bilaterally with arthritis left much more so than right as well as second MTP arthritis and hammertoe     She was seen on 3/10/2022 and discussed surgical treatment with her but she will did not wish to proceed with them instructed use of accommodative shoes bunion sleeves silicone spacers etc.     Patient was seen on 12/13/2023 with persistent worsening symptoms of bilateral first MTP joints and somewhat to the right second toe but mostly symptoms on the left.  Her symptoms preclude wearing any type of closed toed shoe and actually had quite a bit of swelling and some breakdown over the medial aspect of her left first MTP joint several weeks ago while wearing tighter shoes and doing a lot of walking in [place] which is now improving.  HPI     ROS: Foot pain  Medical History[]Expand by Default        Past Medical History:   Diagnosis Date    Abnormal Pap smear of cervix      Breast cancer metastasized to axillary lymph node, right 03/07/2014    Cervical dysplasia       S/P LEEP, continued abnl, TLH    Colon polyp      Herpes       HSV 1 by blood    Low back pain      Peripheral neuropathy      Thyroid cancer       h/o thyroid cancer    Urogenital trichomoniasis           Physical Exam:   Vitals       Vitals:     12/13/23 1104   Temp: 98.4 °F (36.9 °C)   Weight: 61.7 kg (136 lb)   Height: 174 cm (68.5\")   PainSc:   8      Performed 12/13/2023  Well developed with normal mood.  On exam she has hallux valgus deformity on the left with limited motion of the first MTP joint with significant pain.  There is a very small scab over the medial first MTP joint that is nearly healed without sign of infection     Right foot shows hallux valgus again with pain on axial grind testing.  There is some mild hammering of the second toe with " some callus formation medially and limited discomfort at the second MTP joint              Radiology: 3 views of both feet ordered evaluate pain and alignment reviewed and compared to previous x-rays.  There has been some worsening of arthritis of the left first MTP joint with lateral subluxation.     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        Assessment/Plan:  1.  Right hallux valgus with first MTP arthritis  2.  Right second MTP arthritis with deformity of second metatarsal head  3.  Left first MTP arthritis with hallux valgus.     On 12/13/2023 discussed treatment going forward and left side was most symptomatic and definitely would require fusion with which she was in agreement.  Plans were made for correction of the left first metatarsophalangeal joint valgus and due to arthritis will require fusing left first MTP joint including calcaneal bone graft.     I reviewed the operative procedure and postoperative course with her in detail and that she would be nonweightbearing for at least 4 to 6 weeks may not be full weightbearing until least 8 to 10 weeks or possibly longer but this should help decompress her immediately and significantly improve her pain and allow for more accommodative shoe wear     She voiced clear understanding operative procedure and postoperative course with associated risk benefits potential outcomes and complications which can include but not limited to heart attack stroke death pneumonia infection bleeding damage to blood vessels nerves or tendons blood clots pulmonary embolism persistent or worsening pain stiffness malunion nonunion need for subsequent surgery and failure to return to presurgery  and precondition levels of activity as well as wound healing complications and even partial loss of the toe or foot and possible fracture or infection at bone graft harvest site     She was encouraged on getting a scooter     All of her questions were answered to her full satisfaction plans were made to proceed with this on outpatient basis at a mutually convenient time.  She was encouraged to call if she has any questions prior to surgery.     Copied text in this note has been reviewed by me and is accurate as of  01/15/24 .

## 2024-01-16 ENCOUNTER — HOSPITAL ENCOUNTER (OUTPATIENT)
Facility: HOSPITAL | Age: 59
Setting detail: HOSPITAL OUTPATIENT SURGERY
Discharge: HOME | End: 2024-01-16
Attending: ORTHOPAEDIC SURGERY | Admitting: ORTHOPAEDIC SURGERY
Payer: COMMERCIAL

## 2024-01-16 ENCOUNTER — ANESTHESIA EVENT (OUTPATIENT)
Dept: PERIOP | Facility: HOSPITAL | Age: 59
End: 2024-01-16
Payer: COMMERCIAL

## 2024-01-16 ENCOUNTER — APPOINTMENT (OUTPATIENT)
Dept: GENERAL RADIOLOGY | Facility: HOSPITAL | Age: 59
End: 2024-01-16
Payer: COMMERCIAL

## 2024-01-16 ENCOUNTER — ANESTHESIA (OUTPATIENT)
Dept: PERIOP | Facility: HOSPITAL | Age: 59
End: 2024-01-16
Payer: COMMERCIAL

## 2024-01-16 VITALS
RESPIRATION RATE: 18 BRPM | DIASTOLIC BLOOD PRESSURE: 88 MMHG | TEMPERATURE: 97.5 F | SYSTOLIC BLOOD PRESSURE: 159 MMHG | HEART RATE: 66 BPM | OXYGEN SATURATION: 99 %

## 2024-01-16 DIAGNOSIS — M19.072 ARTHRITIS OF FIRST METATARSOPHALANGEAL (MTP) JOINT OF LEFT FOOT: Primary | ICD-10-CM

## 2024-01-16 PROCEDURE — 25010000002 PROPOFOL 10 MG/ML EMULSION: Performed by: NURSE ANESTHETIST, CERTIFIED REGISTERED

## 2024-01-16 PROCEDURE — C1713 ANCHOR/SCREW BN/BN,TIS/BN: HCPCS | Performed by: ORTHOPAEDIC SURGERY

## 2024-01-16 PROCEDURE — C1769 GUIDE WIRE: HCPCS | Performed by: ORTHOPAEDIC SURGERY

## 2024-01-16 PROCEDURE — 25010000002 CEFAZOLIN IN DEXTROSE 2-4 GM/100ML-% SOLUTION: Performed by: ORTHOPAEDIC SURGERY

## 2024-01-16 PROCEDURE — 25010000002 ROPIVACAINE PER 1 MG: Performed by: STUDENT IN AN ORGANIZED HEALTH CARE EDUCATION/TRAINING PROGRAM

## 2024-01-16 PROCEDURE — 28750 FUSION OF BIG TOE JOINT: CPT | Performed by: ORTHOPAEDIC SURGERY

## 2024-01-16 PROCEDURE — 25010000002 BUPIVACAINE PF 0.75 % SOLUTION: Performed by: STUDENT IN AN ORGANIZED HEALTH CARE EDUCATION/TRAINING PROGRAM

## 2024-01-16 PROCEDURE — 25810000003 LACTATED RINGERS PER 1000 ML: Performed by: STUDENT IN AN ORGANIZED HEALTH CARE EDUCATION/TRAINING PROGRAM

## 2024-01-16 PROCEDURE — 76000 FLUOROSCOPY <1 HR PHYS/QHP: CPT

## 2024-01-16 PROCEDURE — 28292 COR HLX VLGS RSC PRX PHLX BS: CPT | Performed by: ORTHOPAEDIC SURGERY

## 2024-01-16 PROCEDURE — 25010000002 DEXAMETHASONE PER 1 MG: Performed by: STUDENT IN AN ORGANIZED HEALTH CARE EDUCATION/TRAINING PROGRAM

## 2024-01-16 PROCEDURE — 25010000002 FENTANYL CITRATE (PF) 50 MCG/ML SOLUTION: Performed by: NURSE ANESTHETIST, CERTIFIED REGISTERED

## 2024-01-16 PROCEDURE — 25010000002 MIDAZOLAM PER 1 MG: Performed by: STUDENT IN AN ORGANIZED HEALTH CARE EDUCATION/TRAINING PROGRAM

## 2024-01-16 DEVICE — SCRW COMPR KREULOCK VA LK FUL/THRD TI 3X16MM: Type: IMPLANTABLE DEVICE | Site: TOES | Status: FUNCTIONAL

## 2024-01-16 DEVICE — IMPLANTABLE DEVICE: Type: IMPLANTABLE DEVICE | Site: TOES | Status: FUNCTIONAL

## 2024-01-16 DEVICE — SCRW COMPR KREULOCK VA LK FUL/THRD TI 3X12MM: Type: IMPLANTABLE DEVICE | Site: TOES | Status: FUNCTIONAL

## 2024-01-16 DEVICE — SCRW COMPR NOHD FUL/THRD TI MICRO 2.5X22MM: Type: IMPLANTABLE DEVICE | Site: TOES | Status: FUNCTIONAL

## 2024-01-16 DEVICE — SCRW CORT 3X16MM: Type: IMPLANTABLE DEVICE | Site: TOES | Status: FUNCTIONAL

## 2024-01-16 DEVICE — SCRW COMPR KREULOCK VA LK FUL/THRD TI 3X20MM: Type: IMPLANTABLE DEVICE | Site: TOES | Status: FUNCTIONAL

## 2024-01-16 DEVICE — SCRW COMPR KREULOCK VA LK FUL/THRD TI 3X14MM: Type: IMPLANTABLE DEVICE | Site: TOES | Status: FUNCTIONAL

## 2024-01-16 RX ORDER — HYDROCODONE BITARTRATE AND ACETAMINOPHEN 7.5; 325 MG/1; MG/1
1 TABLET ORAL ONCE AS NEEDED
Status: DISCONTINUED | OUTPATIENT
Start: 2024-01-16 | End: 2024-01-16 | Stop reason: HOSPADM

## 2024-01-16 RX ORDER — DROPERIDOL 2.5 MG/ML
0.62 INJECTION, SOLUTION INTRAMUSCULAR; INTRAVENOUS
Status: DISCONTINUED | OUTPATIENT
Start: 2024-01-16 | End: 2024-01-16 | Stop reason: HOSPADM

## 2024-01-16 RX ORDER — FLUMAZENIL 0.1 MG/ML
0.2 INJECTION INTRAVENOUS AS NEEDED
Status: DISCONTINUED | OUTPATIENT
Start: 2024-01-16 | End: 2024-01-16 | Stop reason: HOSPADM

## 2024-01-16 RX ORDER — ASPIRIN 325 MG
325 TABLET, DELAYED RELEASE (ENTERIC COATED) ORAL DAILY
Qty: 45 TABLET | Refills: 0 | Status: SHIPPED | OUTPATIENT
Start: 2024-01-17

## 2024-01-16 RX ORDER — ROPIVACAINE HYDROCHLORIDE 5 MG/ML
INJECTION, SOLUTION EPIDURAL; INFILTRATION; PERINEURAL
Status: COMPLETED | OUTPATIENT
Start: 2024-01-16 | End: 2024-01-16

## 2024-01-16 RX ORDER — LIDOCAINE HYDROCHLORIDE 10 MG/ML
0.5 INJECTION, SOLUTION INFILTRATION; PERINEURAL ONCE AS NEEDED
Status: DISCONTINUED | OUTPATIENT
Start: 2024-01-16 | End: 2024-01-16 | Stop reason: HOSPADM

## 2024-01-16 RX ORDER — PROMETHAZINE HYDROCHLORIDE 25 MG/1
25 TABLET ORAL ONCE AS NEEDED
Status: DISCONTINUED | OUTPATIENT
Start: 2024-01-16 | End: 2024-01-16 | Stop reason: HOSPADM

## 2024-01-16 RX ORDER — FENTANYL CITRATE 50 UG/ML
50 INJECTION, SOLUTION INTRAMUSCULAR; INTRAVENOUS
Status: DISCONTINUED | OUTPATIENT
Start: 2024-01-16 | End: 2024-01-16 | Stop reason: HOSPADM

## 2024-01-16 RX ORDER — PROPOFOL 10 MG/ML
VIAL (ML) INTRAVENOUS AS NEEDED
Status: DISCONTINUED | OUTPATIENT
Start: 2024-01-16 | End: 2024-01-16 | Stop reason: SURG

## 2024-01-16 RX ORDER — SODIUM CHLORIDE 0.9 % (FLUSH) 0.9 %
3 SYRINGE (ML) INJECTION EVERY 12 HOURS SCHEDULED
Status: DISCONTINUED | OUTPATIENT
Start: 2024-01-16 | End: 2024-01-16 | Stop reason: HOSPADM

## 2024-01-16 RX ORDER — CEFAZOLIN SODIUM 2 G/100ML
2 INJECTION, SOLUTION INTRAVENOUS ONCE
Status: COMPLETED | OUTPATIENT
Start: 2024-01-16 | End: 2024-01-16

## 2024-01-16 RX ORDER — SODIUM CHLORIDE 0.9 % (FLUSH) 0.9 %
3-10 SYRINGE (ML) INJECTION AS NEEDED
Status: DISCONTINUED | OUTPATIENT
Start: 2024-01-16 | End: 2024-01-16 | Stop reason: HOSPADM

## 2024-01-16 RX ORDER — HYDRALAZINE HYDROCHLORIDE 20 MG/ML
5 INJECTION INTRAMUSCULAR; INTRAVENOUS
Status: DISCONTINUED | OUTPATIENT
Start: 2024-01-16 | End: 2024-01-16 | Stop reason: HOSPADM

## 2024-01-16 RX ORDER — FENTANYL CITRATE 50 UG/ML
INJECTION, SOLUTION INTRAMUSCULAR; INTRAVENOUS AS NEEDED
Status: DISCONTINUED | OUTPATIENT
Start: 2024-01-16 | End: 2024-01-16 | Stop reason: SURG

## 2024-01-16 RX ORDER — HYDROCODONE BITARTRATE AND ACETAMINOPHEN 7.5; 325 MG/1; MG/1
1 TABLET ORAL EVERY 4 HOURS PRN
Qty: 42 TABLET | Refills: 0 | Status: SHIPPED | OUTPATIENT
Start: 2024-01-16

## 2024-01-16 RX ORDER — PROPARACAINE HYDROCHLORIDE 5 MG/ML
1 SOLUTION/ DROPS OPHTHALMIC ONCE
Status: COMPLETED | OUTPATIENT
Start: 2024-01-16 | End: 2024-01-16

## 2024-01-16 RX ORDER — SCOLOPAMINE TRANSDERMAL SYSTEM 1 MG/1
1 PATCH, EXTENDED RELEASE TRANSDERMAL ONCE
Status: DISCONTINUED | OUTPATIENT
Start: 2024-01-16 | End: 2024-01-16 | Stop reason: HOSPADM

## 2024-01-16 RX ORDER — FAMOTIDINE 10 MG/ML
20 INJECTION, SOLUTION INTRAVENOUS ONCE
Status: COMPLETED | OUTPATIENT
Start: 2024-01-16 | End: 2024-01-16

## 2024-01-16 RX ORDER — MIDAZOLAM HYDROCHLORIDE 1 MG/ML
1 INJECTION INTRAMUSCULAR; INTRAVENOUS
Status: DISCONTINUED | OUTPATIENT
Start: 2024-01-16 | End: 2024-01-16 | Stop reason: HOSPADM

## 2024-01-16 RX ORDER — IPRATROPIUM BROMIDE AND ALBUTEROL SULFATE 2.5; .5 MG/3ML; MG/3ML
3 SOLUTION RESPIRATORY (INHALATION) ONCE AS NEEDED
Status: DISCONTINUED | OUTPATIENT
Start: 2024-01-16 | End: 2024-01-16 | Stop reason: HOSPADM

## 2024-01-16 RX ORDER — HYDROMORPHONE HYDROCHLORIDE 1 MG/ML
0.5 INJECTION, SOLUTION INTRAMUSCULAR; INTRAVENOUS; SUBCUTANEOUS
Status: DISCONTINUED | OUTPATIENT
Start: 2024-01-16 | End: 2024-01-16 | Stop reason: HOSPADM

## 2024-01-16 RX ORDER — EPHEDRINE SULFATE 50 MG/ML
5 INJECTION, SOLUTION INTRAVENOUS ONCE AS NEEDED
Status: DISCONTINUED | OUTPATIENT
Start: 2024-01-16 | End: 2024-01-16 | Stop reason: HOSPADM

## 2024-01-16 RX ORDER — PROMETHAZINE HYDROCHLORIDE 25 MG/1
25 SUPPOSITORY RECTAL ONCE AS NEEDED
Status: DISCONTINUED | OUTPATIENT
Start: 2024-01-16 | End: 2024-01-16 | Stop reason: HOSPADM

## 2024-01-16 RX ORDER — DIPHENHYDRAMINE HYDROCHLORIDE 50 MG/ML
12.5 INJECTION INTRAMUSCULAR; INTRAVENOUS
Status: DISCONTINUED | OUTPATIENT
Start: 2024-01-16 | End: 2024-01-16 | Stop reason: HOSPADM

## 2024-01-16 RX ORDER — LABETALOL HYDROCHLORIDE 5 MG/ML
5 INJECTION, SOLUTION INTRAVENOUS
Status: DISCONTINUED | OUTPATIENT
Start: 2024-01-16 | End: 2024-01-16 | Stop reason: HOSPADM

## 2024-01-16 RX ORDER — NALOXONE HCL 0.4 MG/ML
0.2 VIAL (ML) INJECTION AS NEEDED
Status: DISCONTINUED | OUTPATIENT
Start: 2024-01-16 | End: 2024-01-16 | Stop reason: HOSPADM

## 2024-01-16 RX ORDER — ONDANSETRON 2 MG/ML
4 INJECTION INTRAMUSCULAR; INTRAVENOUS ONCE AS NEEDED
Status: DISCONTINUED | OUTPATIENT
Start: 2024-01-16 | End: 2024-01-16 | Stop reason: HOSPADM

## 2024-01-16 RX ORDER — CEPHALEXIN 500 MG/1
500 CAPSULE ORAL EVERY 6 HOURS
Qty: 8 CAPSULE | Refills: 0 | Status: SHIPPED | OUTPATIENT
Start: 2024-01-16 | End: 2024-01-18

## 2024-01-16 RX ORDER — ERYTHROMYCIN 5 MG/G
OINTMENT OPHTHALMIC EVERY 12 HOURS
Status: DISCONTINUED | OUTPATIENT
Start: 2024-01-16 | End: 2024-01-16 | Stop reason: HOSPADM

## 2024-01-16 RX ORDER — BUPIVACAINE HYDROCHLORIDE 7.5 MG/ML
INJECTION, SOLUTION EPIDURAL; RETROBULBAR
Status: COMPLETED | OUTPATIENT
Start: 2024-01-16 | End: 2024-01-16

## 2024-01-16 RX ORDER — KETOROLAC TROMETHAMINE 5 MG/ML
1 SOLUTION OPHTHALMIC EVERY 4 HOURS PRN
Status: DISCONTINUED | OUTPATIENT
Start: 2024-01-16 | End: 2024-01-16 | Stop reason: HOSPADM

## 2024-01-16 RX ORDER — DEXAMETHASONE SODIUM PHOSPHATE 4 MG/ML
INJECTION, SOLUTION INTRA-ARTICULAR; INTRALESIONAL; INTRAMUSCULAR; INTRAVENOUS; SOFT TISSUE
Status: COMPLETED | OUTPATIENT
Start: 2024-01-16 | End: 2024-01-16

## 2024-01-16 RX ORDER — SODIUM CHLORIDE, SODIUM LACTATE, POTASSIUM CHLORIDE, CALCIUM CHLORIDE 600; 310; 30; 20 MG/100ML; MG/100ML; MG/100ML; MG/100ML
9 INJECTION, SOLUTION INTRAVENOUS CONTINUOUS
Status: DISCONTINUED | OUTPATIENT
Start: 2024-01-16 | End: 2024-01-16 | Stop reason: HOSPADM

## 2024-01-16 RX ADMIN — SCOPALAMINE 1 PATCH: 1 PATCH, EXTENDED RELEASE TRANSDERMAL at 06:24

## 2024-01-16 RX ADMIN — KETOROLAC TROMETHAMINE 1 DROP: 5 SOLUTION/ DROPS OPHTHALMIC at 11:10

## 2024-01-16 RX ADMIN — PROPARACAINE HYDROCHLORIDE 1 DROP: 5 SOLUTION/ DROPS OPHTHALMIC at 11:08

## 2024-01-16 RX ADMIN — MIDAZOLAM 2 MG: 1 INJECTION INTRAMUSCULAR; INTRAVENOUS at 06:43

## 2024-01-16 RX ADMIN — ROPIVACAINE HYDROCHLORIDE 40 ML: 5 INJECTION EPIDURAL; INFILTRATION; PERINEURAL at 06:45

## 2024-01-16 RX ADMIN — BUPIVACAINE HYDROCHLORIDE 1.4 ML: 7.5 INJECTION, SOLUTION EPIDURAL; RETROBULBAR at 07:08

## 2024-01-16 RX ADMIN — FENTANYL CITRATE 25 MCG: 50 INJECTION, SOLUTION INTRAMUSCULAR; INTRAVENOUS at 07:10

## 2024-01-16 RX ADMIN — DEXAMETHASONE SODIUM PHOSPHATE 8 MG: 4 INJECTION, SOLUTION INTRA-ARTICULAR; INTRALESIONAL; INTRAMUSCULAR; INTRAVENOUS; SOFT TISSUE at 06:45

## 2024-01-16 RX ADMIN — SODIUM CHLORIDE, POTASSIUM CHLORIDE, SODIUM LACTATE AND CALCIUM CHLORIDE 9 ML/HR: 600; 310; 30; 20 INJECTION, SOLUTION INTRAVENOUS at 06:18

## 2024-01-16 RX ADMIN — FAMOTIDINE 20 MG: 10 INJECTION INTRAVENOUS at 06:17

## 2024-01-16 RX ADMIN — ERYTHROMYCIN 1 APPLICATION: 5 OINTMENT OPHTHALMIC at 11:43

## 2024-01-16 RX ADMIN — CEFAZOLIN SODIUM 2 G: 2 INJECTION, SOLUTION INTRAVENOUS at 06:54

## 2024-01-16 RX ADMIN — PROPOFOL 75 MCG/KG/MIN: 10 INJECTION, EMULSION INTRAVENOUS at 07:17

## 2024-01-16 RX ADMIN — PROPOFOL 30 MG: 10 INJECTION, EMULSION INTRAVENOUS at 07:17

## 2024-01-16 RX ADMIN — PROPOFOL 30 MG: 10 INJECTION, EMULSION INTRAVENOUS at 07:27

## 2024-01-16 RX ADMIN — FENTANYL CITRATE 25 MCG: 50 INJECTION, SOLUTION INTRAMUSCULAR; INTRAVENOUS at 07:21

## 2024-01-16 NOTE — ANESTHESIA PREPROCEDURE EVALUATION
Anesthesia Evaluation     history of anesthetic complications:  PONV               Airway   Mallampati: II  TM distance: >3 FB  Neck ROM: full  Dental      Pulmonary    Cardiovascular         Neuro/Psych  GI/Hepatic/Renal/Endo    (+) thyroid problem     Musculoskeletal     Abdominal    Substance History      OB/GYN          Other   arthritis,   history of cancer                  Anesthesia Plan    ASA 2     spinal     intravenous induction     Anesthetic plan, risks, benefits, and alternatives have been provided, discussed and informed consent has been obtained with: patient.      CODE STATUS:

## 2024-01-16 NOTE — ANESTHESIA POSTPROCEDURE EVALUATION
Patient: Cinthya Ayoub    Procedure Summary       Date: 01/16/24 Room / Location: [facility] OR [address] [place] [address] [facility]    Anesthesia Start: 0703 Anesthesia Stop: 0929    Procedure: Left first metatarsal phalangeal joint fusion with bone graft from heel (Left: Toes) Diagnosis:       Arthritis of first metatarsophalangeal (MTP) joint of left foot      (Arthritis of first metatarsophalangeal (MTP) joint of left foot [M19.072])    Surgeons: Mickey Nevarez MD Provider: Raimundo Cheng MD    Anesthesia Type: spinal ASA Status: 2            Anesthesia Type: spinal    Vitals  Vitals Value Taken Time   /92 01/16/24 1000   Temp 36.4 °C (97.5 °F) 01/16/24 1000   Pulse 62 01/16/24 1012   Resp 12 01/16/24 1000   SpO2 97 % 01/16/24 1012   Vitals shown include unfiled device data.        Post Anesthesia Care and Evaluation    Patient location during evaluation: bedside  Patient participation: complete - patient participated  Level of consciousness: awake and alert  Pain management: adequate    Airway patency: patent  Anesthetic complications: No anesthetic complications  PONV Status: controlled  Cardiovascular status: blood pressure returned to baseline and acceptable  Respiratory status: acceptable  Hydration status: acceptable

## 2024-01-16 NOTE — DISCHARGE INSTRUCTIONS
What to expect after a Nerve Block    Nerve blocks administered to block pain affect many types of nerves, including those nerves that control movement, pain, and normal sensation. Following a nerve block, you may notice some bruising at the site where the block was given. You may experience sensations such as: numbness of the affected area or limb, tingling, heaviness (that is the limb feels heavy to you), weakness or inability to move the affected arm or leg, or a feeling as if your arm or leg has “fallen asleep.”     A nerve block can last from 2 to 36 hours depending on the medications used.  Usually the weakness wears off first followed by the tingling and heaviness. As the block wears off, you may begin to notice pain; however, this sequence of events may occur in any order. Typically, you will be able to move your limb before you will feel it. Once a nerve block begins to wear off, the effects are usually completely gone within 60 minutes.  If you experience continued side effects that you believe are block related for longer than 48 hours, please call your healthcare provider. Please see block-specific instructions below.    Instructions for any Block involving the leg/foot:   If you have had a leg /foot block, you should not bear weight on the affected leg until the block has worn off. After the block has worn off, weight bearing should be as directed by your surgeon. You may be sent home with crutches. You are at high risk for falling because of the anesthetic effects on your leg. Please use caution when standing or trying to move or walk. Have someone assist you until your leg and foot function have returned to normal.     Protection of a “blocked” limb  After a nerve block, you cannot feel pain, pressure, or extremes of temperature in the affected limb. And because of this, your blocked limb is at more risk for injury. For example, it is possible to burn your limb on an extremely hot surface without  feeling it.     When resting, it is important to reposition your limb periodically to avoid prolonged pressure on it. This may require the use of pillows and padding.    While sleeping, you should avoid rolling onto the affected limb or putting too much pressure on it.     If you have a cast or tight dressing, check the color of your fingers or toes of the affected limb. Call your surgeon if they look discolored (that is, dusky, dark colored).    Use caution in cold weather. Cover your limb appropriately to protect it from the cold.    Pain Management:  Your surgeon will give you a prescription for pain medication. Begin taking this before the nerve block wears off. Bear in mind that sometimes the block can wear off in the middle of the night.          Corneal Abrasion Discharge Instructions      Bacitracin Ophthalmic Ointment or Erythromycin Ophthalmic Ointment:     Apply a thin layer to affected eye 3 times daily for a maximum of 2 days.    An eye patch is not needed.  Do not rub the affected eye.    If you have no improvement within the next 24 hours, please contact your eye doctor.  If you have no improvement within the next 24 hours, please contact your eye doctor.

## 2024-01-16 NOTE — ANESTHESIA PROCEDURE NOTES
Peripheral Block    Pre-sedation assessment completed: 1/16/2024 6:45 AM    Patient reassessed immediately prior to procedure    Patient location during procedure: pre-op  Start time: 1/16/2024 6:45 AM  Stop time: 1/16/2024 6:55 AM  Reason for block: at surgeon's request and post-op pain management  Performed by  Anesthesiologist: Raimundo Cheng MD  Preanesthetic Checklist  Completed: patient identified, IV checked, site marked, risks and benefits discussed, surgical consent, monitors and equipment checked, pre-op evaluation and timeout performed  Prep:  Pt Position: supine  Sterile barriers:mask, gloves and cap  Prep: ChloraPrep  Patient monitoring: blood pressure monitoring, continuous pulse oximetry and EKG  Procedure    Sedation: yes  Performed under: local infiltration  Guidance:ultrasound guided    ULTRASOUND INTERPRETATION.  Using ultrasound guidance a 21 G gauge needle was placed in close proximity to the nerve, at which point, under ultrasound guidance anesthetic was injected in the area of the nerve and spread of the anesthesia was seen on ultrasound in close proximity thereto.  There were no abnormalities seen on ultrasound; a digital image was taken; and the patient tolerated the procedure with no complications. Images:still images obtained, printed/placed on chart    Laterality:left  Block Type:popliteal and adductor canal block  Injection Technique:single-shot  Needle Type:echogenic and Tuohy  Needle Gauge:21 G  Resistance on Injection: none    Medications Used: dexamethasone (DECADRON) injection - Injection   8 mg - 1/16/2024 6:45:00 AM  ropivacaine (NAROPIN) 0.5 % injection - Injection   40 mL - 1/16/2024 6:45:00 AM      Post Assessment  Injection Assessment: negative aspiration for heme, no paresthesia on injection and incremental injection  Patient Tolerance:comfortable throughout block  Complications:no  Additional Notes  Ultrasound guidance used to visualize nerve anatomy, guide needle  placement and verify local anesthetic disbursement.

## 2024-01-16 NOTE — ANESTHESIA PROCEDURE NOTES
Spinal Block      Patient reassessed immediately prior to procedure    Patient location during procedure: OR  Start Time: 1/16/2024 7:08 AM  Stop Time: 1/16/2024 7:12 AM  Indication:at surgeon's request  Performed By  Anesthesiologist: Raimundo Cheng MD  Preanesthetic Checklist  Completed: patient identified, IV checked, site marked, risks and benefits discussed, surgical consent, monitors and equipment checked, pre-op evaluation and timeout performed  Spinal Block Prep:  Patient Position:sitting  Sterile Tech:cap, gloves, gown, mask and sterile barriers  Prep:Chloraprep  Patient Monitoring:blood pressure monitoring, continuous pulse oximetry and EKG    Spinal Block Procedure  Approach:midline  Guidance:landmark technique  Location:L3-L4  Needle Type:Jarrett  Needle Gauge:25 G  Placement of Spinal needle event:cerebrospinal fluid aspirated  Paresthesia: no  Fluid Appearance:clear  Medications: bupivacaine PF (MARCAINE) 0.75 % injection - Spinal   1.4 mL - 1/16/2024 7:08:00 AM   Post Assessment  Patient Tolerance:patient tolerated the procedure well with no apparent complications  Complications no

## 2024-01-16 NOTE — INTERVAL H&P NOTE
H&P updated. The patient was examined and patient remains tender to palpation diffusely about the left first MTP joint including the medial eminence.  She voiced clear understanding the operative procedure and postoperative course with associated risk benefits potential outcomes and complications as reviewed previously.  She has a scooter for postoperative mobilization and wishes to have surgery done under spinal but will also have her get a block.  All of her questions answered to her full satisfaction we will plan to proceed as scheduled

## 2024-01-16 NOTE — OP NOTE
Operative Note      Facility: [facility]  Patient Name: Cinthya Ayoub  YOB: 1965  Date: 1/16/2024  Medical Record Number: [id number]      Pre-op Diagnosis: 1.  Left hallux valgus  2.  Left first metatarsal phalangeal joint arthritis      Post-op Diagnosis:  1.  Left hallux valgus  2.  Left first metatarsal phalangeal joint arthritis        Procedure(s):  1.  Left hallux valgus correction with medial eminence resection and lateral release  2.  Left first metatarsophalangeal joint fusion using Arthrex MaxForce fusion plate  Surgeon(s):  Mickey Nevarez MD    Anesthesia: General  Anesthesiologist: Raimundo Cheng MD  CRNA: Melida Rodriguez CRNA    Staff:   Cell Saver : Vince Menjivar  Circulator: Jayme Hernández RN  Radiology Technologist: Randi Fournier  Scrub Person: Edmund Woody  Vendor Representative: Santiago Marquez  Assistants : none      Tourniquet time: 60 minutes        Estimated Blood Loss:  15 mL  Drains: None  Specimens: * No orders in the log *  Findings: See Dictation    Complications: None      Indications for Procedure: Patient has had a history of chronic pain and deformity of her left first MTP joint that has precluded normal shoewear and has not been controlled with conservative measures.  Although no guarantees could be given neutral decision made to proceed with operative treatment with clear degenerative risk benefits potential outcomes and complications.          Description of Procedure: Preoperative informed consent and anesthesia evaluation were obtained.  IV antibiotics were administered and the surgical site was marked.  Block was administered by anesthesia.  Patient brought to the operating room where spinal anesthesia was induced as well as conscious sedation.  Well-padded tourniquet was placed left proximal thigh.  Left foot and leg were prepped and draped in a sterile fashion and surgical timeout was performed.    Bony landmarks were  demarcated clinically and radiographically.  Left foot and leg were exsanguinated and pneumatic tourniquet inflated to 250 mmHg.    Incision was made over the first metatarsal phalangeal joint extended proximally and distally.  Full-thickness flaps were carefully elevated with care taken to protect superficial veins and nerves were possible.  EHL tendon was identified Within its sheath.  Capsulotomy was made medial to this and full-thickness capsular flaps were elevated off the dorsal lateral and medial aspects of the first metatarsal phalangeal joint with large medial eminence evident.  There are multiple dorsal osteophytes and arthritic change of the first metatarsophalangeal joint.  The joint was not correctable due to the amount of hallux valgus and this needed to be corrected.  Sagittal saw was used to remove the large medial eminence in line with the medial cortex of the first metatarsal shaft.  This was then contoured with a rongeur.  This fragment was passed off and later used for bone grafting.  The soft tissue structures were released laterally along the base of the proximal phalanx such that I was then able to correct the hallux valgus and proper alignment.  However due to the degree of arthrosis fusion was needed.    The remainder of the head was gently contoured and removed osteophytes plantarly and dorsally.  I then placed the guidewire and confirmed this radiographically.  The head was then reamed to 20 mm with good hemispherical surface to just below the subchondral bone.    Osteophytes were removed from around the first proximal phalangeal base remaining small amount of residual cartilage was removed with a curette down to subchondral bone.  Guidewire was passed and confirmed radiographically.  This was then reamed sequentially up to 20 mm with good containment.    The joint was then provisionally reduced and found to be well aligned.  The fusion surfaces were then prepared with multiple  fenestrations with a 26 2 mm K wire and 2.0 mm drill.  Nice soft cancellous surfaces.  I was able to harvest bone graft from within the medial eminence that had been resected and felt to have adequate bone graft for this and did not need to be take graft from her heel.  Fusion surfaces were sprayed with PRP after thorough irrigation and bone graft was packed in the base the proximal phalanx.  The joint was then reduced and aligned such that there was neutral rotation.  Varus valgus was aligned such that there was about 3 mm space between the first and second toes and the simulated standing position in dorsiflexion such that there was about 3 to 5 mm of excursion of the toe pulp with plantar plate applied.  This was then pinned with a heavy pin provisionally.    The Arthrex 5 degree valgus, 5 degrees dorsiflexion MaxForce plate was placed over the fusion site and found to be well aligned confirming proper alignment.  This was secured provisionally and confirmed radiographically.  I then placed 3 locking screws distally.  I then drilled and used the proprietary compression device to just provide compression across the fusion site while moving provisional fixation.  Joint remains well aligned and this was secured with a bicortical screw.  I then placed 2 additional locking screws proximally.    X-rays showed good alignment.    Guidewire was then passed from proximal medial to distal plantar across the first MTP joint and this was then secured with a 2.5 mm Arthrex headless compression screw.    Wound was thoroughly irrigated x-rays show good alignment and clinically she maintained good alignment as outlined above.  Tourniquet was released and hemostasis was obtained.  Wound was again sprayed with PRP and capsule was closed with 3-0 Vicryl suture.  Skin was closed with 4-0 Vicryl and 4-0 nylon suture.  Sterile dressing was applied and she was placed in a very well-padded forefoot and ankle bunion and [facility] sp[facility]  dressing.  She was awakened transferred to stretcher and taken recovery in stable condition

## 2024-01-16 NOTE — BRIEF OP NOTE
TOE INTERPHALANGEAL JOINT/METATARSOPHALANGEAL JOINT FUSION  Progress Note    Cinthya Ayoub  1/16/2024    Pre-op Diagnosis:   Arthritis of first metatarsophalangeal (MTP) joint of left foot [M19.072]       Post-Op Diagnosis Codes:     * Arthritis of first metatarsophalangeal (MTP) joint of left foot [M19.072]    Procedure/CPT® Codes:        Procedure(s):  Left first metatarsal phalangeal joint fusion               Surgeon(s):  Mickey Nevarez MD    Anesthesia: General    Staff:   Cell Saver : Vince Menjivar  Circulator: Jayme Hernández RN  Radiology Technologist: Randi Fournier  Scrub Person: Edmund Woody  Vendor Representative: Santiago Marquez         Estimated Blood Loss: 15 ml    Urine Voided: * No values recorded between 1/16/2024  7:03 AM and 1/16/2024  9:24 AM *    Specimens:                None    TT: 60 min      Drains: * No LDAs found *    Findings: see dict        Complications: none          Mickey Nevarez MD     Date: 1/16/2024  Time: 09:24 EST

## 2024-01-18 ENCOUNTER — TELEPHONE (OUTPATIENT)
Dept: ORTHOPEDIC SURGERY | Facility: CLINIC | Age: 59
End: 2024-01-18

## 2024-01-19 ENCOUNTER — TELEPHONE (OUTPATIENT)
Dept: ORTHOPEDIC SURGERY | Facility: CLINIC | Age: 59
End: 2024-01-19
Payer: COMMERCIAL

## 2024-01-19 NOTE — TELEPHONE ENCOUNTER
"  Caller: Cinthya Ayoub \"ARIANA\"    Relationship: Self    Best call back number:     What is the best time to reach you: ANY    Who are you requesting to speak with (clinical staff, provider,  specific staff member): CLINICAL      What was the call regarding: PATIENT WAS TOLD TO NOT CHANGE BANDAGE UNLESS SHE SAW A GREENISH LIQUID WHICH MAY BE INFECTION.  SHE IS WONDERING IS IT OK TO LOOK BECAUSE SHE CAN'T TELL IF ANYTHING IS GREENISH WITHOUT CHECKING.  SHE SAID FOOT FEELS FINE     Is it okay if the provider responds through [employer]: PLEASE CALL SHE LEAVES AT 2 TO FLY OUT OF TOWN         "

## 2024-01-19 NOTE — TELEPHONE ENCOUNTER
I spoke with patient she said her things going great she not having any pain.  She does get a little electrical feeling in her toes but this may be due to the neuropathy she had from chemo.  She was not at all worried about it.  She not had any greenish discharge etc. but the discharge instruction and said to not remove the dressing unless she noted any that but could not tell since she could remove the dressing.  She said the dressing looks fine there is no drainage she not having any pain or problems with it so told her to leave the dressing intact keep elevated and keep off of this.  She greatly appreciated the call to let me know if she has any other questions.

## 2024-01-30 ENCOUNTER — TELEPHONE (OUTPATIENT)
Dept: ORTHOPEDIC SURGERY | Facility: CLINIC | Age: 59
End: 2024-01-30
Payer: COMMERCIAL

## 2024-01-30 NOTE — TELEPHONE ENCOUNTER
Patient states she has had a migraine for 10 days now. She says it is the only thing that will break the cycle.

## 2024-01-30 NOTE — TELEPHONE ENCOUNTER
Patient is asking if she can get a Toradol Injection with are Neurologist tomorrow while she is in town.

## 2024-01-31 ENCOUNTER — OFFICE VISIT (OUTPATIENT)
Dept: ORTHOPEDIC SURGERY | Facility: CLINIC | Age: 59
End: 2024-01-31
Payer: COMMERCIAL

## 2024-01-31 VITALS — HEIGHT: 66 IN | TEMPERATURE: 98 F | WEIGHT: 133.6 LBS | BODY MASS INDEX: 21.47 KG/M2

## 2024-01-31 DIAGNOSIS — M19.071 ARTHRITIS OF FIRST METATARSOPHALANGEAL (MTP) JOINT OF RIGHT FOOT: ICD-10-CM

## 2024-01-31 DIAGNOSIS — R52 PAIN: Primary | ICD-10-CM

## 2024-01-31 DIAGNOSIS — M19.072 ARTHRITIS OF FIRST METATARSOPHALANGEAL (MTP) JOINT OF LEFT FOOT: ICD-10-CM

## 2024-01-31 DIAGNOSIS — M92.71 FREIBERG'S INFRACTION, RIGHT: ICD-10-CM

## 2024-01-31 NOTE — PROGRESS NOTES
"Foot Follow Up      Patient: Cinthya Ayoub    YOB: 1965 58 y.o. female    Chief Complaints: Foot doing okay    History of Present Illness: Patient underwent left first MTP fusion without calcaneal bone graft with bunion correction.  We used her medial eminence for bone grafting.  She has been elevating and nonweightbearing.  She is actually gone to [place].    She contacted me over the weekend as she was having a migraine let him know she could take some anti-inflammatories not sure she could take a limited amount of this and could also get a shot of Toradol from her neurologist to help \"break the cycle\" of her migraines.  She had no pain in the left foot.  HPI    ROS: No foot pain  Past Medical History:   Diagnosis Date    Abnormal Pap smear of cervix     Breast cancer metastasized to axillary lymph node, right 03/07/2014    Bunion     BILATERAL    Cervical dysplasia     S/P LEEP, continued abnl, TLH    Colon polyp     Foot pain     Herpes     HSV 1 by blood    History of depression     Itchy skin     RIGHT ARM    Low back pain     Migraines     Peripheral neuropathy     Pinched nerve in neck     PONV (postoperative nausea and vomiting)     Spinal headache     Thyroid cancer     h/o thyroid cancer    Urogenital trichomoniasis      Physical Exam:   Vitals:    01/31/24 1100   Temp: 98 °F (36.7 °C)   Weight: 60.6 kg (133 lb 9.6 oz)   Height: 167.6 cm (66\")   PainSc: 0-No pain   PainLoc: Foot     Well developed with normal mood.  On exam there is minimal swelling to the left first toe there is resolving ecchymosis.  There is mild crusting along the incision site but no sign of infection or breakdown.  There was diminished sensation in the first toe (patient had some preoperative numbness as well from previous chemotherapy).  Calf was nontender without sign of DVT      Radiology: 3 views of the 3 views of the left foot ordered evaluate postoperative alignment reviewed and compared to previous x-rays.  " There is good alignment to the first metatarsophalangeal joint fusion.      Assessment/Plan: Status post left foot surgery as outlined above     1.  Right hallux valgus with first MTP arthritis  2.  Right second MTP arthritis with deformity of second metatarsal head  3.  Left first MTP arthritis with hallux valgus.    We discussed treatment going forward and she can stop the oral anti-inflammatory/to minimize bone healing and the patient but I think it is okay for her to get an injection 1 time by her neurologist to help break her migraine cycle.    We discussed her surgical treatment she seems pleased with this and overall pleased with her alignment and outcome so far    Sutures removed and Steri-Strips were applied.  Wound was cleaned with Betadine and sterile forefoot and ankle bunion spica dressing was applied.    She will continue with elevation and nonweightbearing and use her postoperative shoe when up and about to protect this.    I will see her back in 2 weeks x-rays of her left foot.

## 2024-02-05 ENCOUNTER — TELEPHONE (OUTPATIENT)
Dept: ORTHOPEDIC SURGERY | Facility: CLINIC | Age: 59
End: 2024-02-05
Payer: COMMERCIAL

## 2024-02-05 NOTE — TELEPHONE ENCOUNTER
I spoke with patient and she still having a migraine.  Prior to surgery she been on diclofenac twice daily.  The Toradol last week evidently by her neurologist did not break the cycle of her migraine.  She is asking if she can start anti-inflammatories as we had discontinued them to minimize chance of bone healing in addition.  All that she can tolerate this for now and she is currently remain off the anti-inflammatories at least until I see her back next week and if doing okay we can try restarting them to see if that helps with her migraine.  She appreciated the call and she understands to call if she has any other questions or concerns.  (She has my cell phone number as she is a relative)

## 2024-02-14 ENCOUNTER — OFFICE VISIT (OUTPATIENT)
Dept: ORTHOPEDIC SURGERY | Facility: CLINIC | Age: 59
End: 2024-02-14
Payer: COMMERCIAL

## 2024-02-14 VITALS — BODY MASS INDEX: 21.38 KG/M2 | TEMPERATURE: 96.9 F | WEIGHT: 133 LBS | HEIGHT: 66 IN

## 2024-02-14 DIAGNOSIS — R52 PAIN: Primary | ICD-10-CM

## 2024-02-14 DIAGNOSIS — M92.71 FREIBERG'S INFRACTION, RIGHT: ICD-10-CM

## 2024-02-14 DIAGNOSIS — M19.071 ARTHRITIS OF FIRST METATARSOPHALANGEAL (MTP) JOINT OF RIGHT FOOT: ICD-10-CM

## 2024-02-14 DIAGNOSIS — M20.11 HALLUX VALGUS OF RIGHT FOOT: ICD-10-CM

## 2024-02-14 DIAGNOSIS — M19.072 ARTHRITIS OF FIRST METATARSOPHALANGEAL (MTP) JOINT OF LEFT FOOT: ICD-10-CM

## 2024-02-29 ENCOUNTER — OFFICE VISIT (OUTPATIENT)
Dept: ORTHOPEDIC SURGERY | Facility: CLINIC | Age: 59
End: 2024-02-29
Payer: COMMERCIAL

## 2024-02-29 VITALS — HEIGHT: 66 IN | TEMPERATURE: 97.5 F | WEIGHT: 133 LBS | BODY MASS INDEX: 21.38 KG/M2

## 2024-02-29 DIAGNOSIS — M19.072 ARTHRITIS OF FIRST METATARSOPHALANGEAL (MTP) JOINT OF LEFT FOOT: ICD-10-CM

## 2024-02-29 DIAGNOSIS — M19.071 ARTHRITIS OF FIRST METATARSOPHALANGEAL (MTP) JOINT OF RIGHT FOOT: ICD-10-CM

## 2024-02-29 DIAGNOSIS — Z09 FOLLOW-UP EXAM: Primary | ICD-10-CM

## 2024-02-29 RX ORDER — DICLOFENAC SODIUM 75 MG/1
75 TABLET, DELAYED RELEASE ORAL 2 TIMES DAILY
COMMUNITY

## 2024-02-29 NOTE — PROGRESS NOTES
"Foot Follow Up      Patient: Cinthya Ayoub    YOB: 1965 58 y.o. female    Chief Complaints: Foot does not hurt    History of Present Illness:Patient underwent left first MTP fusion without calcaneal bone graft with bunion correction.  We used her medial eminence for bone grafting on 1/16/2024.     Patient was seen on 1/31/2024 and had been elevating and nonweightbearing.  She had actually gone to [place].     She had contacted me over the weekend previous as she was having a migraine asked me if she could take some anti-inflammatories and to see if she could to take a limited amount of this and could also get a shot of Toradol from her neurologist to help \"break the cycle\" of her migraines.  She had no pain in the left foot.     At that time there was no sign of infection and sutures removed and Steri-Strips were applied she was placed into a forefoot and ankle bunion spica dressing     Patient was seen on 2/14/2024 and had communicated with her some since her previous visit and she did still have trouble getting the  migraine cycle to break on her migraines and was able to actually finally accomplish this but never had to take the diclofenac.  She had had some of her dressing come out and she just cut it off rather than continuing to stuff had it back in.  She has no pain in her left foot.  Her migraines were improved.  The pain close.    Rated 0-10 we discussed treatment and she was pleased with her outcome.  Steri-Strips removed and she was counseled on wound care and a wash this with clean water and reapply a bandage.  She was instructed continue with elevation and allowed partial foot flat weightbearing with walker and boot and let me know if she had any problems.    Patient is seen back today stating that she had recurrence of her migraines that they never really went away and had to restart her diclofenac 75 mg every 12 hours 5 and half weeks after surgery.  She has no pain in her left foot " "and had a few scabs that she said she removed and may have been stitches with her \"thumb nail and nail clippers but has not had any sign of infection.  There is been no drainage on her bandage.  She has been doing partial weightbearing with 2 crutches and her boot.  She has not been sleeping in a postoperative shoe pain is rated 3 out of 10  HPI    ROS: No foot pain  Past Medical History:   Diagnosis Date    Abnormal Pap smear of cervix     Breast cancer metastasized to axillary lymph node, right 03/07/2014    Bunion     BILATERAL    Cervical dysplasia     S/P LEEP, continued abnl, TLH    Colon polyp     Foot pain     Herpes     HSV 1 by blood    History of depression     Itchy skin     RIGHT ARM    Low back pain     Migraines     Peripheral neuropathy     Pinched nerve in neck     PONV (postoperative nausea and vomiting)     Spinal headache     Thyroid cancer     h/o thyroid cancer    Urogenital trichomoniasis      Physical Exam:   Vitals:    02/29/24 1002   Temp: 97.5 °F (36.4 °C)   Weight: 60.3 kg (133 lb)   Height: 167.6 cm (66\")   PainSc: 0-No pain     Well developed with normal mood.  Exam her left first toe has neutral alignment.  I cleaned this up and removed a few small scabs but did not see any sign of infection or any dehiscence.  Toe had diminished sensation to light touch but grossly intact and good capillary refill.      Radiology: 3 views of the left foot ordered evaluate postoperative alignment reviewed and compared to previous x-rays.  There remains good alignment to the first metatarsophalangeal joint fusion and hardware remains intact.      Assessment/Plan:   Status post left foot surgery as outlined above      1.  Right hallux valgus with first MTP arthritis  2.  Right second MTP arthritis with deformity of second metatarsal head  3.  Left first MTP arthritis with hallux valgus.    Overall she seems be doing well and pleased with her outcome.  She will continue watching her bone carefully and " will allow her to progress to weightbearing with her boot and 1 crutch over the next 2 weeks and if doing well may then start progressing off the crutches initially around the house.    Ideally she would not be on the diclofenac as it could minimize bone healing intubation but the migraines have kept her from working and needs to do as such and I think is reasonable to be on that at this point.    Will see her back in 3 weeks with x-rays of her left foot.  She understands to call me if she has any questions or problems and has my number.

## 2024-03-27 ENCOUNTER — OFFICE VISIT (OUTPATIENT)
Dept: ORTHOPEDIC SURGERY | Facility: CLINIC | Age: 59
End: 2024-03-27
Payer: COMMERCIAL

## 2024-03-27 VITALS — WEIGHT: 133 LBS | TEMPERATURE: 96.6 F | HEIGHT: 66 IN | BODY MASS INDEX: 21.38 KG/M2

## 2024-03-27 DIAGNOSIS — M19.072 ARTHRITIS OF FIRST METATARSOPHALANGEAL (MTP) JOINT OF LEFT FOOT: ICD-10-CM

## 2024-03-27 DIAGNOSIS — M19.071 ARTHRITIS OF FIRST METATARSOPHALANGEAL (MTP) JOINT OF RIGHT FOOT: ICD-10-CM

## 2024-03-27 DIAGNOSIS — R52 PAIN: Primary | ICD-10-CM

## 2024-03-27 DIAGNOSIS — M92.71 FREIBERG'S INFRACTION, RIGHT: ICD-10-CM

## 2024-03-27 PROCEDURE — 99024 POSTOP FOLLOW-UP VISIT: CPT | Performed by: ORTHOPAEDIC SURGERY

## 2024-03-27 RX ORDER — ATOGEPANT 60 MG/1
TABLET ORAL
COMMUNITY

## 2024-03-27 NOTE — PROGRESS NOTES
"Foot Follow Up      Patient: Cinthya Ayoub    YOB: 1965 58 y.o. female    Chief Complaints: Foot \"has no pain \"    History of Present Illness:Patient underwent left first MTP fusion without calcaneal bone graft with bunion correction.  We used her medial eminence for bone grafting on 1/16/2024.     Patient was seen on 1/31/2024 and had been elevating and nonweightbearing.  She had actually gone to [place].     She had contacted me over the weekend previous as she was having a migraine asked me if she could take some anti-inflammatories and to see if she could to take a limited amount of this and could also get a shot of Toradol from her neurologist to help \"break the cycle\" of her migraines.  She had no pain in the left foot.     At that time there was no sign of infection and sutures removed and Steri-Strips were applied she was placed into a forefoot and ankle bunion spica dressing     Patient was seen on 2/14/2024 and had communicated with her some since her previous visit and she did still have trouble getting the  migraine cycle to break on her migraines and was able to actually finally accomplish this but never had to take the diclofenac.  She had had some of her dressing come out and she just cut it off rather than continuing to stuff had it back in.  She has no pain in her left foot.  Her migraines were improved.  The pain close.     Rated 0-10 we discussed treatment and she was pleased with her outcome.  Steri-Strips removed and she was counseled on wound care and a wash this with clean water and reapply a bandage.  She was instructed continue with elevation and allowed partial foot flat weightbearing with walker and boot and let me know if she had any problems.     Patient was seen on 2/29/2024 stating that she had recurrence of her migraines that they never really went away and had to restart her diclofenac 75 mg every 12 hours 5 and half weeks after surgery.  She had no pain in her " "left foot and had had a few scabs that she said she removed and may have been stitches with her \"thumb nail\" and nail clippers but has not had any sign of infection.  There has been no drainage on her bandage.  She has been doing partial weightbearing with 2 crutches and her boot.  She had not been sleeping in a postoperative shoe.  Pain was ated 3 out of 10.    I did not see any sign of infection and she refused her outcome.  She is can continue watching her wound very carefully and allowed to progress with weightbearing with her boot and 1 crutch over the next 2 weeks and doing well and start progressing off the crutches initially around the house.  Ideally she would have been off the diclofenac minimize bone healing inhibition but migraines have kept her from working and so she was going to stay on that.    Patient is seen back today saying that she has no pain.  She had been using her boot and crutches but actually going barefoot around the house without pain.  Her wound has now healed with no sign of infection.  Pain is rated 0 out of 10  HPI    ROS: No foot pain  Past Medical History:   Diagnosis Date    Abnormal Pap smear of cervix     Breast cancer metastasized to axillary lymph node, right 03/07/2014    Bunion     BILATERAL    Cervical dysplasia     S/P LEEP, continued abnl, TLH    Colon polyp     Foot pain     Herpes     HSV 1 by blood    History of depression     Itchy skin     RIGHT ARM    Low back pain     Migraines     Peripheral neuropathy     Pinched nerve in neck     PONV (postoperative nausea and vomiting)     Spinal headache     Thyroid cancer     h/o thyroid cancer    Urogenital trichomoniasis      Physical Exam:   Vitals:    03/27/24 1142   Temp: 96.6 °F (35.9 °C)   Weight: 60.3 kg (133 lb)   Height: 167.6 cm (66\")   PainSc: 0-No pain     Well developed with normal mood.  On exam she has neutral alignment of her left first toe wound is healed without sign of infection and no tenderness to " palpation.      Radiology: 3 views left foot ordered evaluate postoperative alignment reviewed and compared to previous x-rays.  First metatarsophalangeal joint fusion appears to be in good alignment and hardware is intact.      Assessment/Plan:   Status post left foot surgery as outlined above      1.  Right hallux valgus with first MTP arthritis  2.  Right second MTP arthritis with deformity of second metatarsal head  3.  Left first MTP arthritis with hallux valgus.    Patient seems to be doing well and since she is mainly barefoot around the house think she can start weaning out of her boot and avoid barefoot ambulation she will use a sturdy athletic wide toed shoe or hiking shoe.    She is leaving on a trip in about a week to go to [place] and is going to try to do some limited hiking the counselor may very careful with this of which she voiced clear understanding.    Will see her back in 4 weeks x-rays of her left foot.    She is considering having something done with her right foot this summer.

## 2024-04-25 ENCOUNTER — OFFICE VISIT (OUTPATIENT)
Dept: ORTHOPEDIC SURGERY | Facility: CLINIC | Age: 59
End: 2024-04-25
Payer: COMMERCIAL

## 2024-04-25 VITALS — WEIGHT: 133 LBS | HEIGHT: 66 IN | BODY MASS INDEX: 21.38 KG/M2 | TEMPERATURE: 97.1 F

## 2024-04-25 DIAGNOSIS — M92.71 FREIBERG'S INFRACTION, RIGHT: Primary | ICD-10-CM

## 2024-04-25 DIAGNOSIS — M19.072 ARTHRITIS OF FIRST METATARSOPHALANGEAL (MTP) JOINT OF LEFT FOOT: ICD-10-CM

## 2024-04-25 DIAGNOSIS — M20.11 HALLUX VALGUS OF RIGHT FOOT: ICD-10-CM

## 2024-04-25 DIAGNOSIS — M20.41 HAMMER TOE OF RIGHT FOOT: ICD-10-CM

## 2024-04-25 DIAGNOSIS — M19.071 ARTHRITIS OF FIRST METATARSOPHALANGEAL (MTP) JOINT OF RIGHT FOOT: ICD-10-CM

## 2024-04-25 PROCEDURE — 99214 OFFICE O/P EST MOD 30 MIN: CPT | Performed by: ORTHOPAEDIC SURGERY

## 2024-04-25 NOTE — PROGRESS NOTES
"Foot Follow Up      Patient: Cinthya Ayoub    YOB: 1965 58 y.o. female    Chief Complaints: Foot \"feels great\"    History of Present Illness:Patient underwent left first MTP fusion without calcaneal bone graft with bunion correction.  We used her medial eminence for bone grafting on 1/16/2024.     Patient was seen on 1/31/2024 and had been elevating and nonweightbearing.  She had actually gone to [place].     She had contacted me over the weekend previous as she was having a migraine asked me if she could take some anti-inflammatories and to see if she could to take a limited amount of this and could also get a shot of Toradol from her neurologist to help \"break the cycle\" of her migraines.  She had no pain in the left foot.     At that time there was no sign of infection and sutures removed and Steri-Strips were applied she was placed into a forefoot and ankle bunion spica dressing     Patient was seen on 2/14/2024 and had communicated with her some since her previous visit and she did still have trouble getting the  migraine cycle to break on her migraines and was able to actually finally accomplish this but never had to take the diclofenac.  She had had some of her dressing come out and she just cut it off rather than continuing to stuff had it back in.  She has no pain in her left foot.  Her migraines were improved.  The pain close.     Rated 0-10 we discussed treatment and she was pleased with her outcome.  Steri-Strips removed and she was counseled on wound care and a wash this with clean water and reapply a bandage.  She was instructed continue with elevation and allowed partial foot flat weightbearing with walker and boot and let me know if she had any problems.     Patient was seen on 2/29/2024 stating that she had recurrence of her migraines that they never really went away and had to restart her diclofenac 75 mg every 12 hours 5 and half weeks after surgery.  She had no pain in her left " "foot and had had a few scabs that she said she removed and may have been stitches with her \"thumb nail\" and nail clippers but has not had any sign of infection.  There has been no drainage on her bandage.  She has been doing partial weightbearing with 2 crutches and her boot.  She had not been sleeping in a postoperative shoe.  Pain was ated 3 out of 10.     I did not see any sign of infection and she refused her outcome.  She is can continue watching her wound very carefully and allowed to progress with weightbearing with her boot and 1 crutch over the next 2 weeks and doing well and start progressing off the crutches initially around the house.  Ideally she would have been off the diclofenac minimize bone healing inhibition but migraines have kept her from working and so she was going to stay on that.     Patient was seen on 3/27/2024 reporting that she had no pain.  She had been using her boot and crutches but actually going barefoot around the house without pain.  Her wound had healed with no sign of infection.  Pain is rated 0 out of 10.    Overall she seemed to doing well and since she had been mainly going barefoot around the house I felt she is start weaning out of her boot but counseled her to avoid barefoot ambulation and use a sturdy athletic shoe or hiking shoe.  She was leaving on a trip to [place] in about a week and was going to try to do some limited hiking and counseled her to be very careful with this of which she voiced clear understanding.  She also reviewed she was considering having something done with her right foot in the summer.    Patient is seen back today porting that her left foot is great.  She was able to tolerate going [place] with her family.  Her migraines prevented her from hiking but her foot was not bothering to her.  She been ambulating well and about a 3 inch heel and tolerating this.    She has no pain in the left first toe.  She does have persistent moderate " "pain around the right first MTP joint with some deviation of the second Knee and mild discomfort around the second MTP joint..  She try getting on a pair of boots in the left foot did find that the right foot was bothersome.  Pain in the left foot is rated 0 out of 10  HPI    ROS: Foot pain  Past Medical History:   Diagnosis Date    Abnormal Pap smear of cervix     Breast cancer metastasized to axillary lymph node, right 03/07/2014    Bunion     BILATERAL    Cervical dysplasia     S/P LEEP, continued abnl, TLH    Colon polyp     Foot pain     Herpes     HSV 1 by blood    History of depression     Itchy skin     RIGHT ARM    Low back pain     Migraines     Peripheral neuropathy     Pinched nerve in neck     PONV (postoperative nausea and vomiting)     Spinal headache     Thyroid cancer     h/o thyroid cancer    Urogenital trichomoniasis      Physical Exam:   Vitals:    04/25/24 1447   Temp: 97.1 °F (36.2 °C)   Weight: 60.3 kg (133 lb)   Height: 167.6 cm (66\")   PainSc: 0-No pain     Well developed with normal mood.  On exam her left foot incision is well-healed there was no sign of infection she had neutral alignment no tenderness to palpation.  Right foot shows moderate hallux valgus that was nearly passively correctable but pain through range of motion with axial grind testing.  There are some palpable osteophytes around the second MTP joint with somewhat limited motion with some discomfort thereof as well as some lateral deviation of the PIP joint with some mild callus medially but no ulceration or infection.      Radiology: 3 views left foot ordered evaluate postoperative alignment reviewed and compared to previous x-rays.      Assessment/Plan:  Status post left foot surgery as outlined above      1.  Right hallux valgus with first MTP arthritis  2.  Right second MTP arthritis with deformity of second metatarsal head and PIP deformity  3.  Left first MTP arthritis with hallux valgus.    We discussed treatment " going forward left foot seem to be doing very well and she is pleased with her outcome.  She will continue with accommodative stiff shoes and she is contemplating doing something with her right foot later in the summer.    Reviewed with her we would likely fuse the first MTP joint similar to what she had on the right side given the arthritis that she has and that she has done well with the left side and this would prevent chance of recurrent deformity.  Would also probably debride the second metatarsal head and fuse the second PIP joint.  When to get a CT scan of the right foot for better preoperative planning and we will see her back in about 6 weeks with x-rays of both feet.

## 2024-05-21 ENCOUNTER — HOSPITAL ENCOUNTER (OUTPATIENT)
Dept: CT IMAGING | Facility: HOSPITAL | Age: 59
Discharge: HOME | End: 2024-05-21
Admitting: ORTHOPAEDIC SURGERY
Payer: COMMERCIAL

## 2024-05-21 DIAGNOSIS — M92.71 FREIBERG'S INFRACTION, RIGHT: ICD-10-CM

## 2024-05-21 DIAGNOSIS — M19.071 ARTHRITIS OF FIRST METATARSOPHALANGEAL (MTP) JOINT OF RIGHT FOOT: ICD-10-CM

## 2024-05-21 DIAGNOSIS — M20.11 HALLUX VALGUS OF RIGHT FOOT: ICD-10-CM

## 2024-05-21 PROCEDURE — 76377 3D RENDER W/INTRP POSTPROCES: CPT

## 2024-05-21 PROCEDURE — 73700 CT LOWER EXTREMITY W/O DYE: CPT

## 2024-05-30 ENCOUNTER — OFFICE VISIT (OUTPATIENT)
Dept: ORTHOPEDIC SURGERY | Facility: CLINIC | Age: 59
End: 2024-05-30
Payer: COMMERCIAL

## 2024-05-30 VITALS — HEIGHT: 66 IN | BODY MASS INDEX: 21.38 KG/M2 | WEIGHT: 133 LBS | TEMPERATURE: 98 F

## 2024-05-30 DIAGNOSIS — M19.072 ARTHRITIS OF FIRST METATARSOPHALANGEAL (MTP) JOINT OF LEFT FOOT: ICD-10-CM

## 2024-05-30 DIAGNOSIS — M92.71 FREIBERG'S INFRACTION, RIGHT: ICD-10-CM

## 2024-05-30 DIAGNOSIS — M19.071 ARTHRITIS OF FIRST METATARSOPHALANGEAL (MTP) JOINT OF RIGHT FOOT: ICD-10-CM

## 2024-05-30 DIAGNOSIS — M20.41 HAMMER TOE OF RIGHT FOOT: ICD-10-CM

## 2024-05-30 DIAGNOSIS — R52 PAIN: Primary | ICD-10-CM

## 2024-05-30 DIAGNOSIS — M20.11 HALLUX VALGUS OF RIGHT FOOT: ICD-10-CM

## 2024-05-30 PROCEDURE — 99214 OFFICE O/P EST MOD 30 MIN: CPT | Performed by: ORTHOPAEDIC SURGERY

## 2024-05-30 RX ORDER — GABAPENTIN 300 MG/1
300 CAPSULE ORAL 3 TIMES DAILY
COMMUNITY
Start: 2024-05-07 | End: 2024-11-03

## 2024-05-30 NOTE — PROGRESS NOTES
"Foot Follow Up      Patient: Cinthya Ayoub    YOB: 1965 58 y.o. female    Chief Complaints: Right foot hurts, left foot feels great    History of Present Illness: Patient underwent left first MTP fusion without calcaneal bone graft with bunion correction using medial eminence for bone grafting on 1/16/2024.  She is doing very well with this and is not having any appreciable pain.  She does however have persistent pain in the right first MTP joint and second toe with chronic deformity.  Please see note from 4/25/2024 for details.  At that time she was seen and sent for CT scan of her right foot for further evaluation for preoperative planning.  HPI    ROS: Foot pain  Past Medical History:   Diagnosis Date    Abnormal Pap smear of cervix     Breast cancer metastasized to axillary lymph node, right 03/07/2014    Bunion     BILATERAL    Cervical dysplasia     S/P LEEP, continued abnl, TLH    Colon polyp     Foot pain     Herpes     HSV 1 by blood    History of depression     Itchy skin     RIGHT ARM    Low back pain     Migraines     Peripheral neuropathy     Pinched nerve in neck     PONV (postoperative nausea and vomiting)     Spinal headache     Thyroid cancer     h/o thyroid cancer    Urogenital trichomoniasis      Physical Exam:   Vitals:    05/30/24 1405   Temp: 98 °F (36.7 °C)   Weight: 60.3 kg (133 lb)   Height: 167.6 cm (66\")   PainSc: 10-Worst pain ever     Well developed with normal mood.  Left foot neutral alignment of the first toe and incision is well-healed.  Right foot shows hallux valgus deformity that is quite passively correctable.  She has somewhat limited motion and pain with axial grind testing.  There is palpable osteophytes around the second MTP joint with limited motion.  There is lateral deviation of the PIP and DIP joints with some callus medially.      Radiology: 3 views of both feet ordered evaluate pain and alignment reviewed and compared to previous x-rays.  Right foot " shows hallux valgus deformity with first MTP arthritis as well as deformity to the second MTP joint with arthritic change as well as some arthritis at the PIP joint with deformity.  Left foot shows good alignment to the first MTP fusion appears to be healed and hardware is intact.    CT scan films and report of the right foot dated 5/21/2024 reviewed which shows degenerative subchondral cystic changes of the great toe metatarsal head with chronic subchondral flattening and deformity at the second metatarsal head and second toe proximal phalangeal base.  There is hallux valgus formerly with severe DJD of the great toe MTP joint with chronic well-corticated ossification at the plantar joint line.  There is severe degenerative changes second MTP joint with osteophyte formation and subcentimeter osteochondral bodies as well as severe degenerative change of the second toe PIP joint.      Assessment/Plan:  Status post left foot surgery as outlined above      1.  Right hallux valgus with first MTP arthritis  2.  Right second MTP arthritis with deformity of second metatarsal head and PIP/DIP deformity  3.  Left first MTP arthritis with hallux valgus.    We discussed treatment going forward and she would like to pursue surgical treatment on the right foot understanding that although she had good outcome on the left that does not guarantee we would have good outcome on the right but I think overall she will do well.  Will plan on right first MTP fusion with possible calcaneal bone grafting and hallux valgus correction as well as second MTP debridement and release and PIP and possibly DIP resection/fusion.    Patient voiced clear understanding of the operative procedure and postoperative course with associated risk benefits potential outcomes and complications which can include but not limited to heart attack stroke death pneumonia infection bleeding damage to blood vessels nerves or tendons blood clots pulmonary embolism  persistent or worsening pain stiffness instability malunion nonunion need for subsequent surgery and failure to return to presurgery and precondition levels of activity as well as wound healing complication and loss of the toes.    All of her questions were answered to her full satisfaction and we will plan to proceed with this on outpatient basis at a mutually convenient time.  She was encouraged to call if she has any questions prior to surgery.

## 2024-07-24 ENCOUNTER — PRE-ADMISSION TESTING (OUTPATIENT)
Dept: PREADMISSION TESTING | Facility: HOSPITAL | Age: 59
End: 2024-07-24
Payer: COMMERCIAL

## 2024-07-24 VITALS
HEART RATE: 76 BPM | TEMPERATURE: 97.8 F | HEIGHT: 67 IN | OXYGEN SATURATION: 100 % | WEIGHT: 138.8 LBS | DIASTOLIC BLOOD PRESSURE: 95 MMHG | RESPIRATION RATE: 16 BRPM | SYSTOLIC BLOOD PRESSURE: 163 MMHG | BODY MASS INDEX: 21.79 KG/M2

## 2024-07-24 LAB
ANION GAP SERPL CALCULATED.3IONS-SCNC: 11.4 MMOL/L (ref 5–15)
BUN SERPL-MCNC: 14 MG/DL (ref 6–20)
BUN/CREAT SERPL: 16.5 (ref 7–25)
CALCIUM SPEC-SCNC: 9.8 MG/DL (ref 8.6–10.5)
CHLORIDE SERPL-SCNC: 103 MMOL/L (ref 98–107)
CO2 SERPL-SCNC: 26.6 MMOL/L (ref 22–29)
CREAT SERPL-MCNC: 0.85 MG/DL (ref 0.57–1)
DEPRECATED RDW RBC AUTO: 40.3 FL (ref 37–54)
EGFRCR SERPLBLD CKD-EPI 2021: 79.5 ML/MIN/1.73
ERYTHROCYTE [DISTWIDTH] IN BLOOD BY AUTOMATED COUNT: 12.4 % (ref 12.3–15.4)
GLUCOSE SERPL-MCNC: 79 MG/DL (ref 65–99)
HCT VFR BLD AUTO: 39.1 % (ref 34–46.6)
HGB BLD-MCNC: 13.2 G/DL (ref 12–15.9)
MCH RBC QN AUTO: 30.3 PG (ref 26.6–33)
MCHC RBC AUTO-ENTMCNC: 33.8 G/DL (ref 31.5–35.7)
MCV RBC AUTO: 89.9 FL (ref 79–97)
PLATELET # BLD AUTO: 265 10*3/MM3 (ref 140–450)
PMV BLD AUTO: 10 FL (ref 6–12)
POTASSIUM SERPL-SCNC: 3.7 MMOL/L (ref 3.5–5.2)
RBC # BLD AUTO: 4.35 10*6/MM3 (ref 3.77–5.28)
SODIUM SERPL-SCNC: 141 MMOL/L (ref 136–145)
WBC NRBC COR # BLD AUTO: 5.13 10*3/MM3 (ref 3.4–10.8)

## 2024-07-24 PROCEDURE — 36415 COLL VENOUS BLD VENIPUNCTURE: CPT

## 2024-07-24 PROCEDURE — 85027 COMPLETE CBC AUTOMATED: CPT

## 2024-07-24 PROCEDURE — 80048 BASIC METABOLIC PNL TOTAL CA: CPT

## 2024-07-24 NOTE — DISCHARGE INSTRUCTIONS
Take the following medications the morning of surgery:    LEVOTHYROXINE  GABAPENTIN    If you are on prescription narcotic pain medication to control your pain you may also take that medication the morning of surgery.    General Instructions:  Do not eat solid food after midnight the night before surgery.  You may drink clear liquids day of surgery but must stop at least one hour before your hospital arrival time.  It is beneficial for you to have a clear drink that contains carbohydrates the day of surgery.  We suggest a 12 to 20 ounce bottle of Gatorade or Powerade for non-diabetic patients or a 12 to 20 ounce bottle of G2 or Powerade Zero for diabetic patients. (Pediatric patients, are not advised to drink a 12 to 20 ounce carbohydrate drink)    Clear liquids are liquids you can see through.  Nothing red in color.     Plain water                                  Sports drinks  Sodas                                   Gelatin (Jell-O)  Fruit juices without pulp such as white grape juice and apple juice  Popsicles that contain no fruit or yogurt  Tea or coffee (no cream or milk added)  Gatorade / Powerade  G2 / Powerade Zero    Chicken / beef / pork / vegetable bullion / cubes or any formulation are not considered clear liquids and are not allowed.    Infants may have breast milk up to four hours before surgery.  Infants drinking formula may drink formula up to six hours before surgery.   Patients who avoid smoking, chewing tobacco and alcohol for 4 weeks prior to surgery have a reduced risk of post-operative complications.  Quit smoking as many days before surgery as you can.  Do not smoke, use chewing tobacco or drink alcohol the day of surgery.   If applicable bring your C-PAP/ BI-PAP machine in with you to preop day of surgery.  Bring any papers given to you in the doctor’s office.  Wear clean comfortable clothes.  Do not wear contact lenses, false eyelashes or make-up.  Bring a case for your glasses.   Bring  crutches or walker if applicable.  Remove all piercings.  Leave jewelry and any other valuables at home.  Hair extensions with metal clips must be removed prior to surgery.  The Pre-Admission Testing nurse will instruct you to bring medications if unable to obtain an accurate list in Pre-Admission Testing.        If you were given a blood bank ID arm band remember to bring it with you the day of surgery.    Preventing a Surgical Site Infection:  For 2 to 3 days before surgery, avoid shaving with a razor because the razor can irritate skin and make it easier to develop an infection.    Any areas of open skin can increase the risk of a post-operative wound infection by allowing bacteria to enter and travel throughout the body.  Notify your surgeon if you have any skin wounds / rashes even if it is not near the expected surgical site.  The area will need assessed to determine if surgery should be delayed until it is healed.  The night prior to surgery shower using a fresh bar of anti-bacterial soap (such as Dial) and clean washcloth.  Sleep in a clean bed with clean clothing.  Do not allow pets to sleep with you.  Shower on the morning of surgery using a fresh bar of anti-bacterial soap (such as Dial) and clean washcloth.  Dry with a clean towel and dress in clean clothing.  Ask your surgeon if you will be receiving antibiotics prior to surgery.  Make sure you, your family, and all healthcare providers clean their hands with soap and water or an alcohol based hand  before caring for you or your wound.    Day of surgery:  Your arrival time is approximately two hours before your scheduled surgery time.  Upon arrival, a Pre-op nurse and Anesthesiologist will review your health history, obtain vital signs, and answer questions you may have.  The only belongings needed at this time will be a list of your home medications and if applicable your C-PAP/BI-PAP machine.  A Pre-op nurse will start an IV and you may  receive medication in preparation for surgery, including something to help you relax.     Please be aware that surgery does come with discomfort.  We want to make every effort to control your discomfort so please discuss any uncontrolled symptoms with your nurse.   Your doctor will most likely have prescribed pain medications.      If you are going home after surgery you will receive individualized written care instructions before being discharged.  A responsible adult must drive you to and from the hospital on the day of your surgery and ideally stay with you through the night.   .  Discharge prescriptions can be filled by the hospital pharmacy during regular pharmacy hours.  If you are having surgery late in the day/evening your prescription may be e-prescribed to your pharmacy.  Please verify your pharmacy hours or chose a 24 hour pharmacy to avoid not having access to your prescription because your pharmacy has closed for the day.    If you are staying overnight following surgery, you will be transported to your hospital room following the recovery period.  [facility] has all private rooms.    If you have any questions please call Pre-Admission Testing at [phone].  Deductibles and co-payments are collected on the day of service. Please be prepared to pay the required co-pay, deductible or deposit on the day of service as defined by your plan.    Call your surgeon immediately if you experience any of the following symptoms:  Sore Throat  Shortness of Breath or difficulty breathing  Cough  Chills  Body soreness or muscle pain  Headache  Fever  New loss of taste or smell  Do not arrive for your surgery ill.  Your procedure will need to be rescheduled to another time.  You will need to call your physician before the day of surgery to avoid any unnecessary exposure to hospital staff as well as other patients.

## 2024-07-26 NOTE — H&P
"Patient: Cinthya Ayoub     YOB: 1965 58 y.o. female     Chief Complaints: Right foot hurts, left foot feels great     History of Present Illness: Patient underwent left first MTP fusion without calcaneal bone graft with bunion correction using medial eminence for bone grafting on 1/16/2024.  Patient was seen on 5/30/2024 and she had been doing very well with this and was not having any appreciable pain.  She did however have persistent pain in the right first MTP joint and second toe with chronic deformity.  Please see note from 4/25/2024 for details.  The time of the appointment on 4/25/2024 she was seen and sent for CT scan of her right foot for further evaluation for preoperative planning.  HPI     ROS: Foot pain  Medical History[]Expand by Default        Past Medical History:   Diagnosis Date    Abnormal Pap smear of cervix      Breast cancer metastasized to axillary lymph node, right 03/07/2014    Bunion       BILATERAL    Cervical dysplasia       S/P LEEP, continued abnl, TLH    Colon polyp      Foot pain      Herpes       HSV 1 by blood    History of depression      Itchy skin       RIGHT ARM    Low back pain      Migraines      Peripheral neuropathy      Pinched nerve in neck      PONV (postoperative nausea and vomiting)      Spinal headache      Thyroid cancer       h/o thyroid cancer    Urogenital trichomoniasis           Physical Exam:   Vitals       Vitals:     05/30/24 1405   Temp: 98 °F (36.7 °C)   Weight: 60.3 kg (133 lb)   Height: 167.6 cm (66\")   PainSc: 10-Worst pain ever      5/30/2024  Well developed with normal mood.  Left foot neutral alignment of the first toe and incision is well-healed.  Right foot shows hallux valgus deformity that is quite passively correctable.  She has somewhat limited motion and pain with axial grind testing.  There is palpable osteophytes around the second MTP joint with limited motion.  There is lateral deviation of the PIP and DIP joints with some " callus medially.        Radiology: 3 views of both feet ordered evaluate pain and alignment reviewed and compared to previous x-rays.  Right foot shows hallux valgus deformity with first MTP arthritis as well as deformity to the second MTP joint with arthritic change as well as some arthritis at the PIP joint with deformity.  Left foot shows good alignment to the first MTP fusion appears to be healed and hardware is intact.     CT scan films and report of the right foot dated 5/21/2024 reviewed which shows degenerative subchondral cystic changes of the great toe metatarsal head with chronic subchondral flattening and deformity at the second metatarsal head and second toe proximal phalangeal base.  There is hallux valgus formerly with severe DJD of the great toe MTP joint with chronic well-corticated ossification at the plantar joint line.  There is severe degenerative changes second MTP joint with osteophyte formation and subcentimeter osteochondral bodies as well as severe degenerative change of the second toe PIP joint.        Assessment/Plan:  Status post left foot surgery as outlined above      1.  Right hallux valgus with first MTP arthritis  2.  Right second MTP arthritis with deformity of second metatarsal head and PIP/DIP deformity  3.  Left first MTP arthritis with hallux valgus.     On 5/30/2024 discussed treatment going forward and she would to pursue surgical treatment on the right foot understanding that although she had good outcome on the left that does not guarantee we would have good outcome on the right but I think overall she will do well.  As were made on right first MTP fusion with possible calcaneal bone grafting and hallux valgus correction as well as second MTP debridement and release and PIP and possibly DIP resection/fusion.     Patient voiced clear understanding of the operative procedure and postoperative course with associated risk benefits potential outcomes and complications which can  include but not limited to heart attack stroke death pneumonia infection bleeding damage to blood vessels nerves or tendons blood clots pulmonary embolism persistent or worsening pain stiffness instability malunion nonunion need for subsequent surgery and failure to return to presurgery and precondition levels of activity as well as wound healing complication and loss of the toes.     All of her questions were answered to her full satisfaction and we will plan to proceed with this on outpatient basis at a mutually convenient time.  She was encouraged to call if she had any questions prior to surgery.     Copied text in this note has been reviewed by me and is accurate as of  07/26/24 .

## 2024-07-30 ENCOUNTER — PREP FOR SURGERY (OUTPATIENT)
Dept: OTHER | Facility: HOSPITAL | Age: 59
End: 2024-07-30
Payer: COMMERCIAL

## 2024-07-30 ENCOUNTER — HOSPITAL ENCOUNTER (OUTPATIENT)
Facility: HOSPITAL | Age: 59
Setting detail: HOSPITAL OUTPATIENT SURGERY
Discharge: HOME | End: 2024-07-30
Attending: ORTHOPAEDIC SURGERY | Admitting: ORTHOPAEDIC SURGERY
Payer: COMMERCIAL

## 2024-07-30 ENCOUNTER — ANESTHESIA (OUTPATIENT)
Dept: PERIOP | Facility: HOSPITAL | Age: 59
End: 2024-07-30

## 2024-07-30 ENCOUNTER — ANESTHESIA EVENT (OUTPATIENT)
Dept: PERIOP | Facility: HOSPITAL | Age: 59
End: 2024-07-30

## 2024-07-30 VITALS
HEART RATE: 65 BPM | TEMPERATURE: 98 F | SYSTOLIC BLOOD PRESSURE: 151 MMHG | DIASTOLIC BLOOD PRESSURE: 99 MMHG | HEIGHT: 66 IN | BODY MASS INDEX: 22.32 KG/M2 | OXYGEN SATURATION: 100 % | WEIGHT: 138.89 LBS | RESPIRATION RATE: 16 BRPM

## 2024-07-30 DIAGNOSIS — M20.11 HALLUX VALGUS OF RIGHT FOOT: ICD-10-CM

## 2024-07-30 DIAGNOSIS — M19.071 ARTHRITIS OF FIRST METATARSOPHALANGEAL (MTP) JOINT OF RIGHT FOOT: Primary | ICD-10-CM

## 2024-07-30 DIAGNOSIS — M92.71 FREIBERG'S INFRACTION, RIGHT: ICD-10-CM

## 2024-07-30 DIAGNOSIS — M20.41 HAMMER TOE OF RIGHT FOOT: ICD-10-CM

## 2024-07-30 DIAGNOSIS — M19.071 ARTHRITIS OF FIRST METATARSOPHALANGEAL (MTP) JOINT OF RIGHT FOOT: ICD-10-CM

## 2024-07-30 PROCEDURE — G0463 HOSPITAL OUTPT CLINIC VISIT: HCPCS | Performed by: ORTHOPAEDIC SURGERY

## 2024-07-30 RX ORDER — LIDOCAINE HYDROCHLORIDE 10 MG/ML
0.5 INJECTION, SOLUTION INFILTRATION; PERINEURAL ONCE AS NEEDED
Status: DISCONTINUED | OUTPATIENT
Start: 2024-07-30 | End: 2024-07-30 | Stop reason: HOSPADM

## 2024-07-30 RX ORDER — MIDAZOLAM HYDROCHLORIDE 1 MG/ML
1 INJECTION INTRAMUSCULAR; INTRAVENOUS
Status: DISCONTINUED | OUTPATIENT
Start: 2024-07-30 | End: 2024-07-30 | Stop reason: HOSPADM

## 2024-07-30 RX ORDER — SODIUM CHLORIDE 0.9 % (FLUSH) 0.9 %
3 SYRINGE (ML) INJECTION EVERY 12 HOURS SCHEDULED
Status: DISCONTINUED | OUTPATIENT
Start: 2024-07-30 | End: 2024-07-30 | Stop reason: HOSPADM

## 2024-07-30 RX ORDER — FAMOTIDINE 10 MG/ML
20 INJECTION, SOLUTION INTRAVENOUS ONCE
Status: DISCONTINUED | OUTPATIENT
Start: 2024-07-30 | End: 2024-07-30 | Stop reason: HOSPADM

## 2024-07-30 RX ORDER — FENTANYL CITRATE 50 UG/ML
50 INJECTION, SOLUTION INTRAMUSCULAR; INTRAVENOUS ONCE AS NEEDED
Status: DISCONTINUED | OUTPATIENT
Start: 2024-07-30 | End: 2024-07-30 | Stop reason: HOSPADM

## 2024-07-30 RX ORDER — SODIUM CHLORIDE, SODIUM LACTATE, POTASSIUM CHLORIDE, CALCIUM CHLORIDE 600; 310; 30; 20 MG/100ML; MG/100ML; MG/100ML; MG/100ML
9 INJECTION, SOLUTION INTRAVENOUS CONTINUOUS
Status: DISCONTINUED | OUTPATIENT
Start: 2024-07-30 | End: 2024-07-30 | Stop reason: HOSPADM

## 2024-07-30 RX ORDER — SODIUM CHLORIDE 0.9 % (FLUSH) 0.9 %
3-10 SYRINGE (ML) INJECTION AS NEEDED
Status: DISCONTINUED | OUTPATIENT
Start: 2024-07-30 | End: 2024-07-30 | Stop reason: HOSPADM

## 2024-07-30 NOTE — ANESTHESIA PREPROCEDURE EVALUATION
Anesthesia Evaluation     history of anesthetic complications:  PONV  NPO Solid Status: > 8 hours             Airway   Mallampati: II  TM distance: >3 FB  Neck ROM: full  Dental - normal exam     Pulmonary    (-) wheezes  Cardiovascular     ECG reviewed  Rhythm: regular    (-) murmur      Neuro/Psych  (+) headaches, numbness  GI/Hepatic/Renal/Endo    (+) thyroid problem     Musculoskeletal     Abdominal    Substance History      OB/GYN          Other      history of cancer                  Anesthesia Plan    ASA 2     regional     (Plan regional with propofol sedation if needed.  Pt w severe PONV and post op fogginess w GA)  intravenous induction     Anesthetic plan, risks, benefits, and alternatives have been provided, discussed and informed consent has been obtained with: patient.    CODE STATUS:

## 2024-07-30 NOTE — INTERVAL H&P NOTE
H&P updated. The patient was examined and patient has a small abrasion that is linear over the lateral third of her right first toe nailbed extending about 4 to 5 mm proximally.  She reports that her pet pig lives in her house and uses a litter box and stepped on her foot with itself sometime last week.  She not sure exactly what day but knows it was bleeding on either Friday, 7/25/2024 Saturday, 7/26/2024.  Reviewed with her that although this does not show any sign of infection at this point is a relatively fresh wound and is in the operative field and in order to minimize chance of potential complications best to wait until this is healed to proceed with surgery.  She voiced clear understanding and was in agreement of this and we will cancel surgery for today and schedule this in several weeks.

## 2024-08-19 ENCOUNTER — TELEPHONE (OUTPATIENT)
Dept: ORTHOPEDIC SURGERY | Facility: CLINIC | Age: 59
End: 2024-08-19
Payer: COMMERCIAL

## 2024-08-19 NOTE — TELEPHONE ENCOUNTER
Patient sent me a personal message I called her back.  She evidently injured her right great toe yesterday when she stubbed it on some type of  block thing and had some bleeding from around her toenail yesterday.  She also then subsequent to that dropped a glass in the bathroom and thought she cleaned things up and stepped on something and think she may have a sharp piece of glass between her first and second toes.  She also reports that she had noted the hair coming out of the bottom of her foot that her first webspace and pulled on it and a lot of hair came out and she thinks it was a pink area that had become embedded.  She also noted somewhat of a wound on the dorsum of her fourth toe this morning.    We had a long discussion regarding treatment going forward and she and I were in agreement that is best to postpone her surgery for several weeks till these things have now healed.  She is going to see her PCP tomorrow about the glass in her foot to see if there is anything they can do about that and she will keep me posted if she needs anything further we will reschedule her for several weeks.  She was counseled on local wound care.

## 2024-08-29 ENCOUNTER — OFFICE VISIT (OUTPATIENT)
Dept: ORTHOPEDIC SURGERY | Facility: CLINIC | Age: 59
End: 2024-08-29
Payer: COMMERCIAL

## 2024-08-29 VITALS — BODY MASS INDEX: 21.66 KG/M2 | HEIGHT: 67 IN | TEMPERATURE: 97.7 F | WEIGHT: 138 LBS

## 2024-08-29 DIAGNOSIS — M20.41 HAMMER TOE OF RIGHT FOOT: ICD-10-CM

## 2024-08-29 DIAGNOSIS — M92.71 FREIBERG'S INFRACTION, RIGHT: ICD-10-CM

## 2024-08-29 DIAGNOSIS — M19.072 ARTHRITIS OF FIRST METATARSOPHALANGEAL (MTP) JOINT OF LEFT FOOT: ICD-10-CM

## 2024-08-29 DIAGNOSIS — M19.071 ARTHRITIS OF FIRST METATARSOPHALANGEAL (MTP) JOINT OF RIGHT FOOT: ICD-10-CM

## 2024-08-29 DIAGNOSIS — Z09 FOLLOW-UP EXAM: ICD-10-CM

## 2024-08-29 DIAGNOSIS — S92.354A CLOSED NONDISPLACED FRACTURE OF FIFTH METATARSAL BONE OF RIGHT FOOT, INITIAL ENCOUNTER: ICD-10-CM

## 2024-08-29 DIAGNOSIS — S86.301A INJURY OF PERONEAL TENDON OF RIGHT FOOT, INITIAL ENCOUNTER: ICD-10-CM

## 2024-08-29 DIAGNOSIS — R52 PAIN: Primary | ICD-10-CM

## 2024-08-29 DIAGNOSIS — M20.11 HALLUX VALGUS OF RIGHT FOOT: ICD-10-CM

## 2024-08-29 NOTE — PROGRESS NOTES
Foot Follow Up      Patient: Cinthya Ayoub    YOB: 1965 58 y.o. female    Chief Complaints: Foot is sore    History of Present Illness:Patient underwent left first MTP fusion without calcaneal bone graft with bunion correction using medial eminence for bone grafting on 1/16/2024. She was seen on 5/30/2024 and was doing very well with that and was not having any appreciable pain. She did however have persistent pain in the right first MTP joint and second toe with chronic deformity. Please see note from 4/25/2024 for details.  On 4/25/2024 she was seen and sent for CT scan of her right foot for further evaluation for preoperative planning.    On 5/30/2024 we discussed treatment going forward and plan on right first MTP fusion with possible calcaneal bone grafting as well as second MTP debridement release and PIP and possible DIP resection/fusion.    Surgery was originally scheduled for 7/30/2024 however at the time of surgery she was found to have an abrasion over the distal toe and toenail from where her PIP had stepped on it then surgery was postponed.  Surgery was to be rescheduled for 8/27/2024 however I spoke with patient prior to that and she had struck a  block with her foot injuring her great toe and then subsequently and also stepped on a piece of broken glass and thought she may have some glass in the bottom of her foot and also had bleeding from her fourth toe.  Spoke with her at length and she was going to go see her PCP about the glass in her foot and we decided to postpone surgery until her toe wounds were healed.  Had also indicated that it might be best for her to have an x-ray which she did not end up doing    Patient called morning of 8/29/2024 indicating that she had an issue with the side of her right foot and felt a pop with a subsequent bump.  She was brought in for further evaluation.    Patient reports that on 8/26/2024 she was helping make a bed and her foot was  "somewhat awkwardly aligned between the bed and the wall and felt somewhat of a pop mainly along the proximal aspect of the fifth metatarsal with some pain and swelling to that area.  She reports that the first toe wound has now healed and the fourth toe wound is healing but this has been more painful.  She does not really have pain at the proximal aspect of the hindfoot.  She reports that the piece of glass that she thought she had her foot \"worked itself out\".    HPI    ROS: Foot pain  Past Medical History:   Diagnosis Date    Abnormal Pap smear of cervix     Arthritis     Breast cancer metastasized to axillary lymph node, right 03/07/2014    Bunion     BILATERAL    Cervical dysplasia     S/P LEEP, continued abnl, TLH    Colon polyp     Foot pain     Herpes     HSV 1 by blood    History of depression     Itchy skin     RIGHT ARM    Low back pain     Migraines     Peripheral neuropathy     Pinched nerve in neck     PONV (postoperative nausea and vomiting)     Thyroid cancer     h/o thyroid cancer    Urogenital trichomoniasis      Physical Exam:   Vitals:    08/29/24 1336   Temp: 97.7 °F (36.5 °C)   Weight: 62.6 kg (138 lb)   Height: 168.9 cm (66.5\")   PainSc:   3     Well developed with normal mood.  On exam she has a healing abrasion over the distal dorsal aspect of the fourth toe without sign of infection.  The first toe wounds have now healed there is hallux valgus with first MTP arthritis with pain on range of motion as well as limited motion with pain at the second MTP joint.  There is moderate tenderness at the base of the fifth metatarsal but really not appreciable pain at the inferolateral hindfoot along the peroneus longus tendon did not have exacerbation of pain with resisted eversion.      Radiology: 3 views of the right foot ordered evaluate pain and alignment reviewed and compared to previous x-rays.  There remains hallux valgus deformity with first MTP arthritis.  It also remains arthritis of the " second MTP joint with spurring.  There does appear to be a new small avulsion at the base of the fifth metatarsal without significant retraction compared to previous x-rays.  There may be some slight retraction of the os peroneum      Assessment/Plan:  CT scan films and report of the right foot dated 5/21/2024 reviewed which shows degenerative subchondral cystic changes of the great toe metatarsal head with chronic subchondral flattening and deformity at the second metatarsal head and second toe proximal phalangeal base.  There is hallux valgus formerly with severe DJD of the great toe MTP joint with chronic well-corticated ossification at the plantar joint line.  There is severe degenerative changes second MTP joint with osteophyte formation and subcentimeter osteochondral bodies as well as severe degenerative change of the second toe PIP joint.        Assessment/Plan:  Status post left foot surgery as outlined above      1.  Right hallux valgus with first MTP arthritis  2.  Right second MTP arthritis with deformity of second metatarsal head and PIP/DIP deformity  3.  Left first MTP arthritis with hallux valgus.  4.  New injury with probable right proximal fifth metatarsal avulsion fracture at peroneus brevis insertion and/or possible peroneus longus injury with mild retraction of the os peroneum    We discussed treatment going forward discussed getting an MRI which she wanted to hold off on and I do not feel discussed the going to change her treatment protocol this we would not plan on doing surgery there in conjunction with the remainder of her foot.    She can get back into a boot that she already has limit activity on this is much as possible and will hold off on rescheduling her forefoot surgery until her toe wound is healed.  Will see her back in 3 weeks with x-rays of her right foot.  She was encouraged to call if she has any questions or problems prior to follow-up.

## 2024-09-23 ENCOUNTER — OFFICE VISIT (OUTPATIENT)
Dept: ORTHOPEDIC SURGERY | Facility: CLINIC | Age: 59
End: 2024-09-23
Payer: COMMERCIAL

## 2024-09-23 VITALS — HEIGHT: 66 IN | TEMPERATURE: 97.3 F | WEIGHT: 134 LBS | BODY MASS INDEX: 21.53 KG/M2

## 2024-09-23 DIAGNOSIS — R52 PAIN: ICD-10-CM

## 2024-09-23 DIAGNOSIS — M20.11 HALLUX VALGUS OF RIGHT FOOT: ICD-10-CM

## 2024-09-23 DIAGNOSIS — M92.71 FREIBERG'S INFRACTION, RIGHT: ICD-10-CM

## 2024-09-23 DIAGNOSIS — M19.071 ARTHRITIS OF FIRST METATARSOPHALANGEAL (MTP) JOINT OF RIGHT FOOT: Primary | ICD-10-CM

## 2024-09-23 DIAGNOSIS — M20.41 HAMMER TOE OF RIGHT FOOT: ICD-10-CM

## 2024-09-23 DIAGNOSIS — S92.354D CLOSED NONDISPLACED FRACTURE OF FIFTH METATARSAL BONE OF RIGHT FOOT WITH ROUTINE HEALING, SUBSEQUENT ENCOUNTER: ICD-10-CM

## 2024-09-23 DIAGNOSIS — S86.301D INJURY OF PERONEAL TENDON OF RIGHT FOOT, SUBSEQUENT ENCOUNTER: ICD-10-CM

## 2024-09-23 PROCEDURE — 99214 OFFICE O/P EST MOD 30 MIN: CPT | Performed by: ORTHOPAEDIC SURGERY

## 2024-09-23 PROCEDURE — 73630 X-RAY EXAM OF FOOT: CPT | Performed by: ORTHOPAEDIC SURGERY

## 2024-09-23 RX ORDER — SUMATRIPTAN 100 MG/1
TABLET, FILM COATED ORAL AS NEEDED
COMMUNITY
Start: 2024-08-29

## 2024-09-30 ENCOUNTER — OFFICE VISIT (OUTPATIENT)
Dept: OBSTETRICS AND GYNECOLOGY | Facility: CLINIC | Age: 59
End: 2024-09-30
Payer: COMMERCIAL

## 2024-09-30 ENCOUNTER — TELEPHONE (OUTPATIENT)
Dept: ORTHOPEDIC SURGERY | Facility: CLINIC | Age: 59
End: 2024-09-30

## 2024-09-30 VITALS
HEIGHT: 66 IN | DIASTOLIC BLOOD PRESSURE: 84 MMHG | SYSTOLIC BLOOD PRESSURE: 136 MMHG | BODY MASS INDEX: 22.63 KG/M2 | WEIGHT: 140.8 LBS

## 2024-09-30 DIAGNOSIS — Z11.3 SCREENING EXAMINATION FOR STD (SEXUALLY TRANSMITTED DISEASE): Primary | ICD-10-CM

## 2024-09-30 DIAGNOSIS — Z13.820 SCREENING FOR OSTEOPOROSIS: ICD-10-CM

## 2024-09-30 DIAGNOSIS — Z11.51 SCREENING FOR HUMAN PAPILLOMAVIRUS (HPV): ICD-10-CM

## 2024-09-30 DIAGNOSIS — Z12.11 SCREENING FOR COLON CANCER: ICD-10-CM

## 2024-09-30 DIAGNOSIS — Z01.419 ROUTINE GYNECOLOGICAL EXAMINATION: ICD-10-CM

## 2024-09-30 DIAGNOSIS — N95.2 VAGINAL ATROPHY: ICD-10-CM

## 2024-09-30 RX ORDER — ESTRADIOL 4 UG/1
1 INSERT VAGINAL 2 TIMES WEEKLY
Qty: 8 EACH | Refills: 11 | Status: SHIPPED | OUTPATIENT
Start: 2024-09-30

## 2024-09-30 NOTE — PROGRESS NOTES
GYN Annual Exam     CC- Here for annual exam.     Cinthya Ayoub is a 58 y.o. female previous patient not seen in the last three years who presents for annual well woman exam.  She was last seen in 2021.  She had a LEEP and then had recurrence and then had a TLH.  She then had an interval BSO per GYN oncology.  She had stage III breast cancer and had a bilateral mastectomy.  She has been using Imvexxy 4 mg with the blessing of her oncologist.  She is also on Prolia through oncology.  No postmenopausal bleeding.  No bladder issues      OB History          0    Para   0    Term   0       0    AB   0    Living   0         SAB   0    IAB   0    Ectopic   0    Molar   0    Multiple   0    Live Births   0                Menarche:13  Menopause:48-TLH/USO for dysplasia, interval BSO per GYN oncology  HRT:none, vaginal Imvexxy only  Current contraception: post menopausal status and status post hysterectomy  History of abnormal Pap smear: yes -  s/p LEEP and s/p hysterectomy   History of abnormal mammogram: yes - stage III breast cancer, status post bilateral mastectomy  Family history of uterine, colon or ovarian cancer: yes - dad colon cancer age 74  Family history of breast cancer: yes - in great grandmother  STD's: HSV, trichomoniasis  Last pap smear: 2021-normal Pap/HPV  Gardasil: Missed  KEVIN:  Mom with PE\  Migraines with aura      Health Maintenance   Topic Date Due    MAMMOGRAM  Never done    Pneumococcal Vaccine 0-64 (1 of 2 - PCV) Never done    ZOSTER VACCINE (1 of 2) Never done    ANNUAL PHYSICAL  2019    Annual Gynecologic Pelvic and Breast Exam  2022    COLORECTAL CANCER SCREENING  2023    PAP SMEAR  2024    INFLUENZA VACCINE  Never done    COVID-19 Vaccine (2023- season) 2024    TDAP/TD VACCINES (2 - Td or Tdap) 2032    HEPATITIS C SCREENING  Completed       Past Medical History:   Diagnosis Date    Abnormal Pap smear of cervix     Arthritis      Breast cancer metastasized to axillary lymph node, right 03/07/2014    Bunion     BILATERAL    Cervical dysplasia     S/P LEEP, continued abnl, TLH    Colon polyp     Foot pain     Herpes     HSV 1 by blood    History of depression     Itchy skin     RIGHT ARM    Low back pain     Migraines     With aura    Osteoporosis     Peripheral neuropathy     Pinched nerve in neck     PONV (postoperative nausea and vomiting)     Thyroid cancer     h/o thyroid cancer    Urogenital trichomoniasis        Past Surgical History:   Procedure Laterality Date    APPENDECTOMY      BREAST BIOPSY      CERVICAL BIOPSY  W/ LOOP ELECTRODE EXCISION      CERVICAL SPINE SURGERY      COLONOSCOPY N/A 08/17/2018    Procedure: COLONOSCOPY, polypectomy;  Surgeon: Danielle Morelos MD;  Location: [facility] OR;  Service: Gastroenterology    ENDOSCOPY      HYSTERECTOMY      TLH/USO dysplasia    HYSTEROSCOPY  2008    KNEE ARTHROSCOPY Left 1982    LUMBAR SPINE SURGERY      MASTECTOMY      B mastectomy with TRAM flap reconstruction    OOPHORECTOMY      lsc BSO by gyn onc    OTHER SURGICAL HISTORY      lymph node transplant    TOE FUSION Left 01/16/2024    Procedure: Left first metatarsal phalangeal joint fusion with bone graft from heel;  Surgeon: Mickey Nevarez MD;  [place]  [facility] [place] [facility] [place]  Service: Orthopedics;  Laterality: Left;    TOTAL HIP ARTHROPLASTY Bilateral 04/01/2022 05/01/2022    TOTAL THYROIDECTOMY      follicular cancer         Current Outpatient Medications:     anastrozole (ARIMIDEX) 1 MG tablet, Take 1 tablet by mouth Daily., Disp: , Rfl:     B Complex Vitamins (VITAMIN B COMPLEX) capsule capsule, Take 2 capsules by mouth Daily. HOLD PER MD INSTR, Disp: , Rfl:     BIOTIN PO, Take 1 each by mouth Daily., Disp: , Rfl:     Cholecalciferol (VITAMIN D) 1000 units tablet, Take 1 tablet by mouth Daily., Disp: , Rfl:     diclofenac (VOLTAREN) 75 MG EC tablet, Take 1 tablet by mouth 2 (Two) Times a Day. HOLD PER MD INSTR, Disp: , Rfl:  "    DULoxetine (CYMBALTA) 30 MG capsule, Take 1 capsule by mouth Daily., Disp: , Rfl:     gabapentin (NEURONTIN) 300 MG capsule, Take 1 capsule by mouth 3 (Three) Times a Day., Disp: , Rfl:     levothyroxine sodium (TIROSINT) 88 MCG capsule, Take 100 mcg by mouth Daily., Disp: , Rfl:     multivitamin (THERAGRAN) tablet tablet, Take 1 tablet by mouth Daily. HOLD PER MD INSTR, Disp: , Rfl:     ondansetron ODT (Zofran ODT) 4 MG disintegrating tablet, Place 1 tablet on the tongue Every 6 (Six) Hours As Needed for Nausea or Vomiting for up to 20 doses., Disp: 20 tablet, Rfl: 0    promethazine (PHENERGAN) 25 MG tablet, Take 1 tablet by mouth Every 8 (Eight) Hours As Needed., Disp: , Rfl:     SUMAtriptan (IMITREX) 100 MG tablet, As Needed., Disp: , Rfl:     ZOLMitriptan (ZOMIG) 5 MG nasal solution, Administer 1 spray into the nostril(s) as directed by provider As Needed., Disp: , Rfl:     Estradiol (Imvexxy Maintenance Pack) 4 MCG insert, Insert 1 capsule into the vagina 2 (Two) Times a Week., Disp: 8 each, Rfl: 11    Allergies   Allergen Reactions    Ciprofloxacin Anaphylaxis and Rash     Bradycardia, HYPOTENSION, lost consciousness, low O2      Codeine Nausea And Vomiting, Rash and Confusion     \"Felt hyperactive and out of head, jittery\"      Levofloxacin Rash and Other (See Comments)     HYPOTENSION, Lost consciousness during chemo    Oxycodone-Acetaminophen Other (See Comments)     Makes patient cry and could not eat       Social History     Tobacco Use    Smoking status: Never    Smokeless tobacco: Never   Vaping Use    Vaping status: Never Used   Substance Use Topics    Alcohol use: Not Currently     Comment: socially     Drug use: No       Family History   Problem Relation Age of Onset    Colon cancer Father 74    Pulmonary embolism Mother     No Known Problems Brother     Breast cancer Maternal Great-Grandmother     Ovarian cancer Neg Hx     Deep vein thrombosis Neg Hx     Malig Hyperthermia Neg Hx     Uterine " "cancer Neg Hx        Review of Systems   Constitutional:  Positive for activity change. Negative for appetite change, fatigue, fever and unexpected weight change.   Eyes:  Negative for photophobia and visual disturbance.   Respiratory:  Negative for cough and shortness of breath.    Cardiovascular:  Negative for chest pain and palpitations.   Gastrointestinal:  Negative for abdominal distention, abdominal pain, constipation, diarrhea and nausea.   Endocrine: Negative for cold intolerance and heat intolerance.   Genitourinary:  Negative for dyspareunia, dysuria, menstrual problem, pelvic pain, vaginal bleeding and vaginal discharge.   Musculoskeletal:  Positive for arthralgias, gait problem and joint swelling. Negative for back pain.   Skin:  Negative for color change and rash.   Neurological:  Negative for headaches.   Hematological:  Negative for adenopathy. Does not bruise/bleed easily.   Psychiatric/Behavioral:  Negative for dysphoric mood. The patient is not nervous/anxious.        /84   Ht 167.6 cm (65.98\")   Wt 63.9 kg (140 lb 12.8 oz)   LMP  (LMP Unknown)   BMI 22.74 kg/m²     Physical Exam  Vitals and nursing note reviewed. Exam conducted with a chaperone present.   Constitutional:       Appearance: Normal appearance. She is well-developed and normal weight.   HENT:      Head: Normocephalic and atraumatic.   Eyes:      General: No scleral icterus.     Conjunctiva/sclera: Conjunctivae normal.   Neck:      Thyroid: No thyromegaly.   Cardiovascular:      Rate and Rhythm: Normal rate and regular rhythm.   Pulmonary:      Effort: Pulmonary effort is normal.      Breath sounds: Normal breath sounds.   Chest:   Breasts:     Right: No swelling, bleeding, mass, skin change or tenderness.      Left: No swelling, bleeding, mass, skin change or tenderness.       Abdominal:      General: Bowel sounds are normal. There is no distension.      Palpations: Abdomen is soft. There is no mass.      Tenderness: There " is no abdominal tenderness. There is no guarding or rebound.      Hernia: No hernia is present.   Genitourinary:     Exam position: Supine.      Labia:         Right: No rash, tenderness or lesion.         Left: No rash, tenderness or lesion.       Urethra: Prolapse present. No urethral pain, urethral swelling or urethral lesion.      Vagina: No signs of injury and foreign body. No vaginal discharge, erythema, tenderness or bleeding.      Uterus: Absent.       Adnexa:         Right: No mass, tenderness or fullness.          Left: No mass, tenderness or fullness.        Rectum: Normal.          Comments: Mild to moderate atrophy  Drusen cervix absent  Normal cuff  Musculoskeletal:      Cervical back: Neck supple.   Skin:     General: Skin is warm and dry.   Neurological:      Mental Status: She is alert and oriented to person, place, and time.   Psychiatric:         Behavior: Behavior normal.         Thought Content: Thought content normal.         Judgment: Judgment normal.            Assessment/Plan    1) GYN HM: pap/HPV/C/G/T  SBE demonstrated and encouraged.  2) STD screening: accepts Condoms encouraged.  3) Bone health - Weight bearing exercise, dietary calcium recommendations and vitamin D reviewed.   4) Diet and Exercise discussed  5) Smoking Status: No  6) Social: No issues  7)MMG:  N/A, status post bilateral mastectomy  8) DEXA-UTD 6/2020-osteopenia 13 and 0.3%.  Check DEXA now  9)C scope- due 8/2023, will refer LG  10) Vaginal atrophy and urethral prolapse- ERX Imvexxy 4 mcg x 2/wek.Patient counseled that vaginal estrogen rings, creams and tablets are available and highly effective at treating local vaginal symptoms such as atrophy and vaginal dryness.  Vaginal estrogen does not cause uterine overgrowth and does not require a progestogen to protect the uterus.  Very small amounts of estrogen are absorbed systemically.  For patients with a history of an estrogen dependent cancer such as breast cancer, the  decision to use local estrogen for local vaginal symptoms should be made after consultation with her oncologist.  Possible side effects include local irritation or burning and/or vaginal bleeding and should always be reported.  Patient has the blessing of her oncologist to use this medication  11)  Follow up prn and 1 year annual        Diagnoses and all orders for this visit:    1. Screening examination for STD (sexually transmitted disease) (Primary)  -     Hepatitis B Surface Antigen  -     Hepatitis C Antibody  -     HIV-1 / O / 2 Ag / Antibody  -     HSV 1 & 2 - Specific Antibody, IgG  -     RPR, Rfx Qn RPR / Confirm TP    2. Routine gynecological examination  -     POC Urinalysis Dipstick  -     IGP,CtNgTv,Apt HPV,rfx 16 / 18,45    3. Screening for human papillomavirus (HPV)  -     IGP,CtNgTv,Apt HPV,rfx 16 / 18,45    4. Screening for osteoporosis  -     DEXA Bone Density Axial; Future    5. Screening for colon cancer  -     Ambulatory Referral For Screening Colonoscopy    6. Vaginal atrophy    Other orders  -     Estradiol (Imvexxy Maintenance Pack) 4 MCG insert; Insert 1 capsule into the vagina 2 (Two) Times a Week.  Dispense: 8 each; Refill: 11        Edelmira Aviles MD  09/30/2024    13:39 EDT

## 2024-09-30 NOTE — TELEPHONE ENCOUNTER
ATTEMPTED TO WARM TRANSFER    Provider:  DR. RAJESH CARRION    Caller:  SHANEL CLARKE    Relationship to Patient: SELF      Phone Number: [phone]    Reason for Call:  PATIENT ASKED TO SPEAK WITH [facility]-SURGERY SCHEDULER    When was the patient last seen: 9/23/24

## 2024-10-01 LAB
HBV SURFACE AG SERPL QL IA: NEGATIVE
HCV IGG SERPL QL IA: NON REACTIVE
HIV 1+2 AB+HIV1 P24 AG SERPL QL IA: NON REACTIVE
HSV1 IGG SER IA-ACNC: 32 INDEX (ref 0–0.9)
HSV2 IGG SER IA-ACNC: <0.91 INDEX (ref 0–0.9)
RPR SER QL: NON REACTIVE

## 2024-10-07 ENCOUNTER — PRE-ADMISSION TESTING (OUTPATIENT)
Dept: PREADMISSION TESTING | Facility: HOSPITAL | Age: 59
End: 2024-10-07
Payer: COMMERCIAL

## 2024-10-07 VITALS
BODY MASS INDEX: 21.29 KG/M2 | DIASTOLIC BLOOD PRESSURE: 96 MMHG | TEMPERATURE: 97.8 F | RESPIRATION RATE: 16 BRPM | HEIGHT: 68 IN | SYSTOLIC BLOOD PRESSURE: 148 MMHG | WEIGHT: 140.5 LBS | OXYGEN SATURATION: 99 % | HEART RATE: 81 BPM

## 2024-10-07 LAB
ANION GAP SERPL CALCULATED.3IONS-SCNC: 9.2 MMOL/L (ref 5–15)
BUN SERPL-MCNC: 14 MG/DL (ref 6–20)
BUN/CREAT SERPL: 15.7 (ref 7–25)
C TRACH RRNA CVX QL NAA+PROBE: NEGATIVE
CALCIUM SPEC-SCNC: 9.5 MG/DL (ref 8.6–10.5)
CHLORIDE SERPL-SCNC: 98 MMOL/L (ref 98–107)
CO2 SERPL-SCNC: 28.8 MMOL/L (ref 22–29)
CREAT SERPL-MCNC: 0.89 MG/DL (ref 0.57–1)
CYTOLOGIST CVX/VAG CYTO: ABNORMAL
CYTOLOGY CVX/VAG DOC CYTO: ABNORMAL
CYTOLOGY CVX/VAG DOC THIN PREP: ABNORMAL
DEPRECATED RDW RBC AUTO: 43.6 FL (ref 37–54)
DX ICD CODE: ABNORMAL
EGFRCR SERPLBLD CKD-EPI 2021: 75.3 ML/MIN/1.73
ERYTHROCYTE [DISTWIDTH] IN BLOOD BY AUTOMATED COUNT: 13.1 % (ref 12.3–15.4)
GLUCOSE SERPL-MCNC: 69 MG/DL (ref 65–99)
HCT VFR BLD AUTO: 40.1 % (ref 34–46.6)
HGB BLD-MCNC: 13.6 G/DL (ref 12–15.9)
HPV GENOTYPE REFLEX: ABNORMAL
HPV I/H RISK 4 DNA CVX QL PROBE+SIG AMP: POSITIVE
HPV16 DNA CVX QL PROBE+SIG AMP: NEGATIVE
HPV18+45 E6+E7 MRNA CVX QL NAA+PROBE: NEGATIVE
Lab: ABNORMAL
Lab: ABNORMAL
MCH RBC QN AUTO: 30.6 PG (ref 26.6–33)
MCHC RBC AUTO-ENTMCNC: 33.9 G/DL (ref 31.5–35.7)
MCV RBC AUTO: 90.1 FL (ref 79–97)
N GONORRHOEA RRNA CVX QL NAA+PROBE: NEGATIVE
OTHER STN SPEC: ABNORMAL
PLATELET # BLD AUTO: 270 10*3/MM3 (ref 140–450)
PMV BLD AUTO: 10.2 FL (ref 6–12)
POTASSIUM SERPL-SCNC: 3.6 MMOL/L (ref 3.5–5.2)
RBC # BLD AUTO: 4.45 10*6/MM3 (ref 3.77–5.28)
SODIUM SERPL-SCNC: 136 MMOL/L (ref 136–145)
STAT OF ADQ CVX/VAG CYTO-IMP: ABNORMAL
T VAGINALIS RRNA SPEC QL NAA+PROBE: NEGATIVE
WBC NRBC COR # BLD AUTO: 5.54 10*3/MM3 (ref 3.4–10.8)

## 2024-10-07 PROCEDURE — 80048 BASIC METABOLIC PNL TOTAL CA: CPT

## 2024-10-07 PROCEDURE — 36415 COLL VENOUS BLD VENIPUNCTURE: CPT

## 2024-10-07 PROCEDURE — 85027 COMPLETE CBC AUTOMATED: CPT

## 2024-10-07 NOTE — DISCHARGE INSTRUCTIONS
Take the following medications the morning of surgery: LEVOTHYROXINE, GABAPENTIN    ARRIVE TO ENTRANCE C.      If you are on prescription narcotic pain medication to control your pain you may also take that medication the morning of surgery.      General Instructions:     Do not eat solid food after midnight the night before surgery.  Clear liquids day of surgery are allowed but must be stopped at least two hours before your hospital arrival time.       Allowed clear liquids      Water, sodas, and tea or coffee with no cream or milk added.       12 to 20 ounces of a clear liquid that contains carbohydrates is recommended.  If non-diabetic, have Gatorade or Powerade.  If diabetic, have G2 or Powerade Zero.     Do not have liquids red in color.  Do not consume chicken, beef, pork or vegetable broth or bouillon cubes of any variety as they are not considered clear liquids and are not allowed.      Infants may have breast milk up to four hours before surgery.  Infants drinking formula may drink formula up to six hours before surgery.   Patients who avoid smoking, chewing tobacco and alcohol for 4 weeks prior to surgery have a reduced risk of post-operative complications.  Quit smoking as many days before surgery as you can.  Do not smoke, use chewing tobacco or drink alcohol the day of surgery.   If applicable bring your C-PAP/ BI-PAP machine in with you to preop day of surgery.  Bring any papers given to you in the doctor’s office.  Wear clean comfortable clothes.  Do not wear contact lenses, false eyelashes or make-up.  Bring a case for your glasses.   Bring crutches or walker if applicable.  Remove all piercings.  Leave jewelry and any other valuables at home.  Hair extensions with metal clips must be removed prior to surgery.  The Pre-Admission Testing nurse will instruct you to bring medications if unable to obtain an accurate list in Pre-Admission Testing.        If you were given a blood bank ID arm band remember  to bring it with you the day of surgery.    Preventing a Surgical Site Infection:  For 2 to 3 days before surgery, avoid shaving with a razor because the razor can irritate skin and make it easier to develop an infection.    Any areas of open skin can increase the risk of a post-operative wound infection by allowing bacteria to enter and travel throughout the body.  Notify your surgeon if you have any skin wounds / rashes even if it is not near the expected surgical site.  The area will need assessed to determine if surgery should be delayed until it is healed.  The night prior to surgery shower using a fresh bar of anti-bacterial soap (such as Dial) and clean washcloth.  Sleep in a clean bed with clean clothing.  Do not allow pets to sleep with you.  Shower on the morning of surgery using a fresh bar of anti-bacterial soap (such as Dial) and clean washcloth.  Dry with a clean towel and dress in clean clothing.  Ask your surgeon if you will be receiving antibiotics prior to surgery.  Make sure you, your family, and all healthcare providers clean their hands with soap and water or an alcohol based hand  before caring for you or your wound.    Day of surgery:  Your arrival time is approximately two hours before your scheduled surgery time.  Please note if you have an early arrival time the surgery doors do not open before 5:00 AM.  Upon arrival, a Pre-op nurse and Anesthesiologist will review your health history, obtain vital signs, and answer questions you may have.  The only belongings needed at this time will be a list of your home medications and if applicable your C-PAP/BI-PAP machine.  A Pre-op nurse will start an IV and you may receive medication in preparation for surgery, including something to help you relax.     Please be aware that surgery does come with discomfort.  We want to make every effort to control your discomfort so please discuss any uncontrolled symptoms with your nurse.   Your doctor  will most likely have prescribed pain medications.      If you are going home after surgery you will receive individualized written care instructions before being discharged.  A responsible adult must drive you to and from the hospital on the day of your surgery and ideally stay with you through the night.   .  Discharge prescriptions can be filled by the hospital pharmacy during regular pharmacy hours.  If you are having surgery late in the day/evening your prescription may be e-prescribed to your pharmacy.  Please verify your pharmacy hours or chose a 24 hour pharmacy to avoid not having access to your prescription because your pharmacy has closed for the day.    If you are staying overnight following surgery, you will be transported to your hospital room following the recovery period.  [facility] has all private rooms.    If you have any questions please call Pre-Admission Testing at [phone].  Deductibles and co-payments are collected on the day of service. Please be prepared to pay the required co-pay, deductible or deposit on the day of service as defined by your plan.    Call your surgeon immediately if you experience any of the following symptoms:  Sore Throat  Shortness of Breath or difficulty breathing  Cough  Chills  Body soreness or muscle pain  Headache  Fever  New loss of taste or smell  Do not arrive for your surgery ill.  Your procedure will need to be rescheduled to another time.  You will need to call your physician before the day of surgery to avoid any unnecessary exposure to hospital staff as well as other patients.

## 2024-10-29 ENCOUNTER — TELEPHONE (OUTPATIENT)
Dept: ORTHOPEDIC SURGERY | Facility: CLINIC | Age: 59
End: 2024-10-29
Payer: COMMERCIAL

## 2024-10-29 NOTE — TELEPHONE ENCOUNTER
Called and spoke to patient to apologize for the late notice on having to cancel her surgery today due to Dr. Nevarez being out. Explained to patient we are working with Dr. Nevarez to get her rescheduled as soon as possible and we would be reaching out to her as soon as we can and know next steps. Patient was very understanding and thanked me for the call. Patient did not need anything else at this time. Patient states she will be performing and [place] and no longer wants to have this surgery done this year and would rather have this surgery in January of 2025.      CH

## 2024-11-27 ENCOUNTER — APPOINTMENT (OUTPATIENT)
Dept: BONE DENSITY | Facility: HOSPITAL | Age: 59
End: 2024-11-27
Payer: COMMERCIAL

## 2024-11-27 DIAGNOSIS — Z13.820 SCREENING FOR OSTEOPOROSIS: ICD-10-CM

## 2024-11-27 PROCEDURE — 77080 DXA BONE DENSITY AXIAL: CPT

## 2024-12-09 ENCOUNTER — TELEPHONE (OUTPATIENT)
Dept: ORTHOPEDIC SURGERY | Facility: CLINIC | Age: 59
End: 2024-12-09

## 2024-12-09 NOTE — TELEPHONE ENCOUNTER
"Caller: Cinthya Ayoub \"ARIANA\"    Relationship to patient: Self    Best call back number: [phone] (home)     Patient is needing: PATIENT WANTS TO GET HER R FOOT SX RESCHEDULED - PLEASE ADVISE         "

## 2024-12-10 NOTE — TELEPHONE ENCOUNTER
Somehow overlooked this message. I have printed the patient's case request and will follow up on referral for further scheduling communication.

## 2024-12-16 ENCOUNTER — OFFICE VISIT (OUTPATIENT)
Dept: ORTHOPEDIC SURGERY | Facility: CLINIC | Age: 59
End: 2024-12-16
Payer: COMMERCIAL

## 2024-12-16 VITALS — TEMPERATURE: 96.4 F | WEIGHT: 137 LBS | HEIGHT: 67 IN | BODY MASS INDEX: 21.5 KG/M2

## 2024-12-16 DIAGNOSIS — S92.315A CLOSED NONDISPLACED FRACTURE OF FIRST METATARSAL BONE OF LEFT FOOT, INITIAL ENCOUNTER: ICD-10-CM

## 2024-12-16 DIAGNOSIS — M79.672 LEFT FOOT PAIN: Primary | ICD-10-CM

## 2024-12-16 PROCEDURE — 99214 OFFICE O/P EST MOD 30 MIN: CPT | Performed by: ORTHOPAEDIC SURGERY

## 2024-12-16 PROCEDURE — 73630 X-RAY EXAM OF FOOT: CPT | Performed by: ORTHOPAEDIC SURGERY

## 2024-12-16 RX ORDER — LOSARTAN POTASSIUM 25 MG/1
25 TABLET ORAL DAILY
COMMUNITY

## 2024-12-16 NOTE — PROGRESS NOTES
Foot Follow Up      Patient: Cinthya Ayoub    YOB: 1965 59 y.o. female    Chief Complaints: I hurt my foot    History of Present Illness:Patient underwent left first MTP fusion without calcaneal bone graft with bunion correction using medial eminence for bone grafting on 1/16/2024. She was seen on 5/30/2024 and was doing very well with that and was not having any appreciable pain. She did however have persistent pain in the right first MTP joint and second toe with chronic deformity. Please see note from 4/25/2024 for details.  On 4/25/2024 she was seen and sent for CT scan of her right foot for further evaluation for preoperative planning.     On 5/30/2024 we discussed treatment going forward and plan on right first MTP fusion with possible calcaneal bone grafting as well as second MTP debridement release and PIP and possible DIP resection/fusion.     Surgery was originally scheduled for 7/30/2024 however at the time of surgery she was found to have an abrasion over the distal toe and toenail from where her PIP had stepped on it then surgery was postponed.  Surgery was to be rescheduled for 8/27/2024 however I spoke with patient prior to that and she had struck a  block with her foot injuring her great toe and then subsequently and also stepped on a piece of broken glass and thought she may have some glass in the bottom of her foot and also had bleeding from her fourth toe.  Spoke with her at length and she was going to go see her PCP about the glass in her foot and we decided to postpone surgery until her toe wounds were healed.  Had also indicated that it might be best for her to have an x-ray which she did not end up doing     Patient called morning of 8/29/2024 indicating that she had an issue with the side of her right foot and felt a pop with a subsequent bump.  She was brought in for further evaluation.     Patient seen on 8/29/2024 reporting that on was 8/26/2024 she was helping  "make a bed and her foot was somewhat awkwardly aligned between the bed and the wall and felt somewhat of a pop mainly along the proximal aspect of the fifth metatarsal with some pain and swelling to that area.  She reported that the first toe wound had healed and the fourth toe wound was healing but this had been more painful.  She did not really have pain at the proximal aspect of the hindfoot.  She did that the piece of glass that she thought she had been her foot \"worked itself out \"     At that time felt that she had new injury with probable right proximal fifth metatarsal avulsion fracture of the peroneus brevis insertion for possible peroneus longus injury with mild retraction of the os peroneum.  She will hold off on MRI and I did not feel that it was going to change her treatment protocol as we would not plan on doing any surgery there in conjunction with the other surgery on her foot.  She was going get back into the boot that she already had and limit activity is much as possible we held off on rescheduling her forefoot surgery until her toe wound was healed.     Patient was seen on 9/23/2024 stating that the area in her right lateral foot felt much better and was not really having appreciable pain there.  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  She still had the same symptoms that she had originally around her right first and second MTP joints with pain there of.    We discussed treatment going forward and plan on proceeding with right first and second toe surgery with first MTP fusion with possible cadaver graft and hallux valgus correction as well as second MTP debridement with possible interpositional arthroplasty and PIP and possibly DIP joint fusion    Her surgery has been scheduled for mid to late October but due to an issue that had surgery was canceled and we mutually decided to postpone it until " "January.    Patient is seen today reporting that she injured her left foot where she had previous first MTP fusion on 12/15/2024.  She was trying to cover her car in [place] with a tarp and bungee beneath it.  She twisted and she said her foot moved but her shoe did not work and felt a pop with onset of persistent worsening severe pain more proximal aspect of the left first metatarsal then distally.  She has had to walk on the side of her foot and used some hydrocodone that she had from previous surgery.  HPI    ROS: Foot pain  Past Medical History:   Diagnosis Date    Abnormal Pap smear of cervix     Arthritis     Breast cancer metastasized to axillary lymph node, right 03/07/2014    Bunion     BILATERAL    Cervical dysplasia     S/P LEEP, continued abnl, TLH    Colon polyp     Foot fracture, right     Foot pain     Herpes     HSV 1 by blood    History of depression     Itchy skin     RIGHT ARM    Low back pain     Migraines     With aura    Osteoporosis     Peripheral neuropathy     Pinched nerve in neck     PONV (postoperative nausea and vomiting)     Sleep apnea treated with continuous positive airway pressure (CPAP)     Thyroid cancer     h/o thyroid cancer    Urogenital trichomoniasis      Physical Exam:   Vitals:    12/16/24 1458   Temp: 96.4 °F (35.8 °C)   Weight: 62.1 kg (137 lb)   Height: 170.2 cm (67\")   PainSc:   8   PainLoc: Foot     Well developed with normal mood.  On exam her left foot shows her incision remains well-healed with neutral alignment to the first toe.  She had minimal tenderness around the first MTP joint mild to moderate tenderness more proximally of the first metatarsal without gross instability at the first TMT joint.      Radiology:3 views left foot ordered evaluate pain and alignment reviewed and compared to prior x-rays from 5/30/2024.  Today's x-rays show no change in alignment to the first MTP fusion compared with previous x-rays.  There is some cortical irregularity proximal to " the plate that is consistent with a nondisplaced fracture about 1 to 2 cm proximal to the plate.  Nothing that appears displaced.      Assessment/Plan: CT scan films and report of the right foot dated 5/21/2024 reviewed which shows degenerative subchondral cystic changes of the great toe metatarsal head with chronic subchondral flattening and deformity at the second metatarsal head and second toe proximal phalangeal base.  There is hallux valgus formerly with severe DJD of the great toe MTP joint with chronic well-corticated ossification at the plantar joint line.  There is severe degenerative changes second MTP joint with osteophyte formation and subcentimeter osteochondral bodies as well as severe degenerative change of the second toe PIP joint.        Assessment/Plan:  Status post left foot surgery as outlined above      1.  Right hallux valgus with first MTP arthritis  2.  Right second MTP arthritis with deformity of second metatarsal head and PIP/DIP deformity  3.  Left first MTP arthritis with hallux valgus.  4.  New injury with probable right proximal fifth metatarsal avulsion fracture at peroneus brevis insertion and/or possible peroneus longus injury with mild retraction of the os peroneum  5.  New onset left first metatarsal fracture proximal to previous fusion plate without displacement    We discussed treatment going forward and we can get a CT scan but it feels good to change her treatment protocol at this time as this does seem to be the area of irregularity on x-ray corresponds to her area of pain.  I do not see any clear evidence of disruption at the fusion site and certainly nothing that is displaced.    We had her fitted with a short boot and she will limit weight on this using the foot flat with a walker and get an accommodative good cushioned arch support to go in there.    She will limit her activity on this and sleep in a postoperative shoe    If she has any worsening symptoms she will let me  know otherwise I will see her back in 3 weeks with x-rays of her left foot.

## 2025-02-03 ENCOUNTER — OFFICE VISIT (OUTPATIENT)
Dept: ORTHOPEDIC SURGERY | Facility: CLINIC | Age: 60
End: 2025-02-03
Payer: COMMERCIAL

## 2025-02-03 VITALS — TEMPERATURE: 96.8 F | WEIGHT: 137 LBS | BODY MASS INDEX: 21.5 KG/M2 | HEIGHT: 67 IN

## 2025-02-03 DIAGNOSIS — M20.41 HAMMER TOE OF RIGHT FOOT: ICD-10-CM

## 2025-02-03 DIAGNOSIS — R52 PAIN: ICD-10-CM

## 2025-02-03 DIAGNOSIS — S92.315D CLOSED NONDISPLACED FRACTURE OF FIRST METATARSAL BONE OF LEFT FOOT WITH ROUTINE HEALING, SUBSEQUENT ENCOUNTER: Primary | ICD-10-CM

## 2025-02-03 DIAGNOSIS — M19.071 ARTHRITIS OF FIRST METATARSOPHALANGEAL (MTP) JOINT OF RIGHT FOOT: ICD-10-CM

## 2025-02-03 DIAGNOSIS — M20.11 HALLUX VALGUS OF RIGHT FOOT: ICD-10-CM

## 2025-02-03 DIAGNOSIS — M92.71 FREIBERG'S INFRACTION, RIGHT: ICD-10-CM

## 2025-02-03 PROBLEM — S92.315A CLOSED NONDISPLACED FRACTURE OF FIRST LEFT METATARSAL BONE: Status: ACTIVE | Noted: 2025-02-03

## 2025-02-03 PROCEDURE — 73630 X-RAY EXAM OF FOOT: CPT | Performed by: ORTHOPAEDIC SURGERY

## 2025-02-03 PROCEDURE — 99214 OFFICE O/P EST MOD 30 MIN: CPT | Performed by: ORTHOPAEDIC SURGERY

## 2025-02-03 RX ORDER — GABAPENTIN 300 MG/1
300 CAPSULE ORAL 3 TIMES DAILY
COMMUNITY
Start: 2025-01-27

## 2025-02-03 NOTE — PROGRESS NOTES
Foot Follow Up      Patient: Cinthya Ayoub    YOB: 1965 59 y.o. female    Chief Complaints: Left foot feels okay.  Right foot hurts    History of Present Illness:Patient underwent left first MTP fusion without calcaneal bone graft with bunion correction using medial eminence for bone grafting on 1/16/2024. She was seen on 5/30/2024 and was doing very well with that and was not having any appreciable pain. She did however have persistent pain in the right first MTP joint and second toe with chronic deformity. Please see note from 4/25/2024 for details.  On 4/25/2024 she was seen and sent for CT scan of her right foot for further evaluation for preoperative planning.     On 5/30/2024 we discussed treatment going forward and plan on right first MTP fusion with possible calcaneal bone grafting as well as second MTP debridement release and PIP and possible DIP resection/fusion.     Surgery was originally scheduled for 7/30/2024 however at the time of surgery she was found to have an abrasion over the distal toe and toenail from where her PIP had stepped on it then surgery was postponed.  Surgery was to be rescheduled for 8/27/2024 however I spoke with patient prior to that and she had struck a  block with her foot injuring her great toe and then subsequently and also stepped on a piece of broken glass and thought she may have some glass in the bottom of her foot and also had bleeding from her fourth toe.  Spoke with her at length and she was going to go see her PCP about the glass in her foot and we decided to postpone surgery until her toe wounds were healed.  Had also indicated that it might be best for her to have an x-ray which she did not end up doing     Patient called morning of 8/29/2024 indicating that she had an issue with the side of her right foot and felt a pop with a subsequent bump.  She was brought in for further evaluation.     Patient seen on 8/29/2024 reporting that on was  "8/26/2024 she was helping make a bed and her foot was somewhat awkwardly aligned between the bed and the wall and felt somewhat of a pop mainly along the proximal aspect of the fifth metatarsal with some pain and swelling to that area.  She reported that the first toe wound had healed and the fourth toe wound was healing but this had been more painful.  She did not really have pain at the proximal aspect of the hindfoot.  She did that the piece of glass that she thought she had been her foot \"worked itself out \"     At that time felt that she had new injury with probable right proximal fifth metatarsal avulsion fracture of the peroneus brevis insertion for possible peroneus longus injury with mild retraction of the os peroneum.  She will hold off on MRI and I did not feel that it was going to change her treatment protocol as we would not plan on doing any surgery there in conjunction with the other surgery on her foot.  She was going get back into the boot that she already had and limit activity is much as possible we held off on rescheduling her forefoot surgery until her toe wound was healed.     Patient was seen on 9/23/2024 stating that the area in her right lateral foot felt much better and was not really having appreciable pain there.  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  She still had the same symptoms that she had originally around her right first and second MTP joints with pain there of.     We discussed treatment going forward and plan on proceeding with right first and second toe surgery with first MTP fusion with possible cadaver graft and hallux valgus correction as well as second MTP debridement with possible interpositional arthroplasty and PIP and possibly DIP joint fusion     Her surgery has been scheduled for mid to late October but due to an issue that I had surgery was canceled and we mutually decided to " postpone it until January.     Patient was seen on 12/16/2024 reporting that she injured her left foot where she had previous first MTP fusion on 12/15/2024.  She was trying to cover her car in [place] with a tarp and bungee beneath it.  She twisted and she said her foot moved but her shoe did not work and felt a pop with onset of persistent worsening severe pain more proximal aspect of the left first metatarsal then distally.  She had had to walk on the side of her foot and used some hydrocodone that she had from previous surgery.    She is felt to have new onset left first metatarsal fracture proximal to previous fusion plate without displacement.  We discussed treatment going forward and discussed a CT scan but did not feel would change her treatment protocol at that time and did seem to be the area of irregularity on x-ray that corresponds to her area of pain.  I did not see any evidence of disruption of the fusion site and no fracture was displaced.  She was fitted with a short boot and instructed on limiting weightbearing and doing foot flat weightbearing with a walker and using accommodative arch support in there.  She was to sleep in a postoperative shoe.  She was to been seen back 3 weeks thereafter.  Her appointment on 1/6/2024 was canceled due to weather.    Patient is seen back today reporting that her right foot feels okay.  She used her boot for about 4 weeks and then used her postoperative shoe for another 2 weeks and then going without it for about the last 10 days using sturdy stiff soled shoes reports that her left foot is feeling better.  She reports persistent pain around the right first MTP joint and second MTP joint with some deviation pain to the second toe at PIP and DIP joints.  HPI    ROS: Foot pain  Past Medical History:   Diagnosis Date    Abnormal Pap smear of cervix     Arthritis     Breast cancer metastasized to axillary lymph node, right 03/07/2014    Bunion     BILATERAL    Cervical  "dysplasia     S/P LEEP, continued abnl, TLH    Colon polyp     Foot fracture, right     Foot pain     Herpes     HSV 1 by blood    History of depression     Itchy skin     RIGHT ARM    Low back pain     Migraines     With aura    Osteoporosis     Peripheral neuropathy     Pinched nerve in neck     PONV (postoperative nausea and vomiting)     Sleep apnea treated with continuous positive airway pressure (CPAP)     Thyroid cancer     h/o thyroid cancer    Urogenital trichomoniasis      Physical Exam:   Vitals:    02/03/25 0829   Temp: 96.8 °F (36 °C)   Weight: 62.1 kg (137 lb)   Height: 170.2 cm (67\")   PainSc:   3     Well developed with normal mood.  On exam she was nontender over the left foot along first metatarsal.  Right foot shows hallux valgus deformity that is passively correctable with pain with global pain around the first MTP joint with range of motion.  Does have some hammering of the second toe with tenderness and lateral deviation of the PIP more so the DIP joint with some callus over the medial aspect of the PIP joint without ulceration.  There was limited motion of the second MTP joint with mild discomfort.      Radiology: 3 views of both feet ordered evaluate pain and alignment reviewed and compared to previous x-rays left foot shows good alignment to the fusion and alignment of the fusion hardware the fracture proximal to this appears to be healing and is without displacement there is increased callus formation    Right foot continues to show hallux valgus with first MTP joint narrowing as well as significant loss of joint space at the second MTP joint with irregularity of the second metatarsal head and lateral deviation at the PIP joint of the second toe.  y    CT scan films and report of the right foot dated 5/21/2024 reviewed which shows degenerative subchondral cystic changes of the great toe metatarsal head with chronic subchondral flattening and deformity at the second metatarsal head and " second toe proximal phalangeal base.  There is hallux valgus formerly with severe DJD of the great toe MTP joint with chronic well-corticated ossification at the plantar joint line.  There is severe degenerative changes second MTP joint with osteophyte formation and subcentimeter osteochondral bodies as well as severe degenerative change of the second toe PIP joint.        Assessment/Plan:  Status post left foot surgery as outlined above      1.  Right hallux valgus with first MTP arthritis  2.  Right second MTP arthritis with deformity of second metatarsal head and PIP/DIP deformity  3.  Left first MTP arthritis with hallux valgus.  4. probable right proximal fifth metatarsal avulsion fracture at peroneus brevis insertion and/or possible peroneus longus injury with mild retraction of the os peroneum  5.  left first metatarsal fracture proximal to previous fusion plate without displacement improved    I am encouraged that her left foot is improved and she will continue with sturdy accommodative shoes.    Will plan to proceed with right foot surgery as outlined previously and again reviewed with her in detail today with associated risk benefits potential outcomes and complications.    We discussed treatment going forward and she would like to pursue surgical treatment on the right foot understanding that although she had good outcome on the left that does not guarantee we would have good outcome on the right but I think overall she will do well.  Will plan on right first MTP hallux valgus correction and fusion with possible calcaneal bone grafting and hallux valgus correction as well as second MTP debridement and release and PIP and possibly DIP resection/fusion.     Patient voiced clear understanding of the operative procedure and postoperative course with associated risk benefits potential outcomes and complications which can include but not limited to heart attack stroke death pneumonia infection bleeding damage  to blood vessels nerves or tendons blood clots pulmonary embolism persistent or worsening pain stiffness instability malunion nonunion need for subsequent surgery and failure to return to presurgery and precondition levels of activity as well as wound healing complication and loss of the toes.     All of her questions were answered to her full satisfaction and we will plan to proceed with this on outpatient basis at a mutually convenient time.  She was encouraged to call if she has any questions prior to surgery.  She would like to do this sometime after therapy.

## 2025-04-29 ENCOUNTER — PRE-ADMISSION TESTING (OUTPATIENT)
Dept: PREADMISSION TESTING | Facility: HOSPITAL | Age: 60
End: 2025-04-29
Payer: COMMERCIAL

## 2025-04-29 VITALS
HEART RATE: 82 BPM | DIASTOLIC BLOOD PRESSURE: 79 MMHG | RESPIRATION RATE: 16 BRPM | HEIGHT: 68 IN | BODY MASS INDEX: 21.58 KG/M2 | TEMPERATURE: 97.8 F | OXYGEN SATURATION: 100 % | SYSTOLIC BLOOD PRESSURE: 121 MMHG | WEIGHT: 142.4 LBS

## 2025-04-29 LAB
ANION GAP SERPL CALCULATED.3IONS-SCNC: 8 MMOL/L (ref 5–15)
BUN SERPL-MCNC: 9 MG/DL (ref 6–20)
BUN/CREAT SERPL: 14.1 (ref 7–25)
CALCIUM SPEC-SCNC: 9.5 MG/DL (ref 8.6–10.5)
CHLORIDE SERPL-SCNC: 104 MMOL/L (ref 98–107)
CO2 SERPL-SCNC: 28 MMOL/L (ref 22–29)
CREAT SERPL-MCNC: 0.64 MG/DL (ref 0.57–1)
DEPRECATED RDW RBC AUTO: 44.3 FL (ref 37–54)
EGFRCR SERPLBLD CKD-EPI 2021: 101.9 ML/MIN/1.73
ERYTHROCYTE [DISTWIDTH] IN BLOOD BY AUTOMATED COUNT: 12.9 % (ref 12.3–15.4)
GLUCOSE SERPL-MCNC: 68 MG/DL (ref 65–99)
HCT VFR BLD AUTO: 41.1 % (ref 34–46.6)
HGB BLD-MCNC: 13.5 G/DL (ref 12–15.9)
MCH RBC QN AUTO: 31 PG (ref 26.6–33)
MCHC RBC AUTO-ENTMCNC: 32.8 G/DL (ref 31.5–35.7)
MCV RBC AUTO: 94.3 FL (ref 79–97)
PLATELET # BLD AUTO: 294 10*3/MM3 (ref 140–450)
PMV BLD AUTO: 9.9 FL (ref 6–12)
POTASSIUM SERPL-SCNC: 3.7 MMOL/L (ref 3.5–5.2)
RBC # BLD AUTO: 4.36 10*6/MM3 (ref 3.77–5.28)
SODIUM SERPL-SCNC: 140 MMOL/L (ref 136–145)
WBC NRBC COR # BLD AUTO: 5.77 10*3/MM3 (ref 3.4–10.8)

## 2025-04-29 PROCEDURE — 80048 BASIC METABOLIC PNL TOTAL CA: CPT

## 2025-04-29 PROCEDURE — 85027 COMPLETE CBC AUTOMATED: CPT

## 2025-04-29 PROCEDURE — 36415 COLL VENOUS BLD VENIPUNCTURE: CPT

## 2025-04-29 NOTE — DISCHARGE INSTRUCTIONS
Take the following medications the morning of surgery:    NEURONTIN, LEVOTHYROXINE, DULOXETINE  If you are on prescription narcotic pain medication to control your pain you may also take that medication the morning of surgery.      General Instructions:     Do not eat solid food after midnight the night before surgery.  Clear liquids day of surgery are allowed but must be stopped at least two hours before your hospital arrival time.       Allowed clear liquids      Water, sodas, and tea or coffee with no cream or milk added.       12 to 20 ounces of a clear liquid that contains carbohydrates is recommended.  If non-diabetic, have Gatorade or Powerade.  If diabetic, have G2 or Powerade Zero.     Do not have liquids red in color.  Do not consume chicken, beef, pork or vegetable broth or bouillon cubes of any variety as they are not considered clear liquids and are not allowed.      Patients who avoid smoking, chewing tobacco and alcohol for 4 weeks prior to surgery have a reduced risk of post-operative complications.    Do not smoke, use chewing tobacco or drink alcohol the day of surgery.   Bring any papers given to you in the doctor’s office.  Wear clean comfortable clothes.  Do not wear contact lenses, false eyelashes or make-up.  Bring a case for your glasses.   Bring crutches  if applicable.  Remove all piercings.  Leave jewelry and any other valuables at home.  Hair extensions with metal clips must be removed prior to surgery.  The Pre-Admission Testing nurse will instruct you to bring medications if unable to obtain an accurate list in Pre-Admission Testing.    Day of surgery you will need to let the preoperative nurse know the last time you took each of your medications.  To ensure a safe environment for patients and staff, we kindly ask that children under the age of 16 not accompany patients.  If you must bring a dependent child or dependent adult please ensure a responsible adult, other than yourself, is  present to supervise them.          Preventing a Surgical Site Infection:  For 2 to 3 days before surgery, avoid shaving with a razor because the razor can irritate skin and make it easier to develop an infection.    Any areas of open skin can increase the risk of a post-operative wound infection by allowing bacteria to enter and travel throughout the body.  Notify your surgeon if you have any skin wounds / rashes even if it is not near the expected surgical site.  The area will need assessed to determine if surgery should be delayed until it is healed.  The night prior to surgery shower using a fresh bar of anti-bacterial soap (such as Dial) and clean washcloth.  Sleep in a clean bed with clean clothing.  Do not allow pets to sleep with you.  Shower on the morning of surgery using a fresh bar of anti-bacterial soap (such as Dial) and clean washcloth.  Dry with a clean towel and dress in clean clothing.  Ask your surgeon if you will be receiving antibiotics prior to surgery.  Make sure you, your family, and all healthcare providers clean their hands with soap and water or an alcohol based hand  before caring for you or your wound.    Day of surgery:  Your arrival time is approximately two hours before your scheduled surgery time.  Please note if you have an early arrival time the surgery doors do not open before 5:00 AM.  Upon arrival, a Pre-op nurse and Anesthesiologist will review your health history, obtain vital signs, and answer questions you may have.  The only belongings needed at this time will be a list of your home medications and if applicable your C-PAP/BI-PAP machine.  A Pre-op nurse will start an IV and you may receive medication in preparation for surgery, including something to help you relax.     Please be aware that surgery does come with discomfort.  We want to make every effort to control your discomfort so please discuss any uncontrolled symptoms with your nurse.   Your doctor will most  likely have prescribed pain medications.      If you are going home after surgery you will receive individualized written care instructions before being discharged.  A responsible adult must drive you to and from the hospital on the day of your surgery and ideally stay with you through the night.   .  Discharge prescriptions can be filled by the hospital pharmacy during regular pharmacy hours.  If you are having surgery late in the day/evening your prescription may be e-prescribed to your pharmacy.  Please verify your pharmacy hours or chose a 24 hour pharmacy to avoid not having access to your prescription because your pharmacy has closed for the day.      If you have any questions please call Pre-Admission Testing at [phone].  Deductibles and co-payments are collected on the day of service. Please be prepared to pay the required co-pay, deductible or deposit on the day of service as defined by your plan.    Call your surgeon immediately if you experience any of the following symptoms:  Sore Throat  Shortness of Breath or difficulty breathing  Cough  Chills  Body soreness or muscle pain  Headache  Fever  New loss of taste or smell  Do not arrive for your surgery ill.  Your procedure will need to be rescheduled to another time.  You will need to call your physician before the day of surgery to avoid any unnecessary exposure to hospital staff as well as other patients.

## 2025-05-02 NOTE — H&P
Patient: Cinthya Ayoub     YOB: 1965 59 y.o. female     Chief Complaints: Left foot feels okay.  Right foot hurts     History of Present Illness:Patient underwent left first MTP fusion without calcaneal bone graft with bunion correction using medial eminence for bone grafting on 1/16/2024. She was seen on 5/30/2024 and was doing very well with that and was not having any appreciable pain. She did however have persistent pain in the right first MTP joint and second toe with chronic deformity. Please see note from 4/25/2024 for details.  On 4/25/2024 she was seen and sent for CT scan of her right foot for further evaluation for preoperative planning.     On 5/30/2024 we discussed treatment going forward and plan on right first MTP fusion with possible calcaneal bone grafting as well as second MTP debridement release and PIP and possible DIP resection/fusion.     Surgery was originally scheduled for 7/30/2024 however at the time of surgery she was found to have an abrasion over the distal toe and toenail from where her PIP had stepped on it then surgery was postponed.  Surgery was to be rescheduled for 8/27/2024 however I spoke with patient prior to that and she had struck a  block with her foot injuring her great toe and then subsequently and also stepped on a piece of broken glass and thought she may have some glass in the bottom of her foot and also had bleeding from her fourth toe.  Spoke with her at length and she was going to go see her PCP about the glass in her foot and we decided to postpone surgery until her toe wounds were healed.  Had also indicated that it might be best for her to have an x-ray which she did not end up doing     Patient called morning of 8/29/2024 indicating that she had an issue with the side of her right foot and felt a pop with a subsequent bump.  She was brought in for further evaluation.     Patient seen on 8/29/2024 reporting that on was 8/26/2024 she was  "helping make a bed and her foot was somewhat awkwardly aligned between the bed and the wall and felt somewhat of a pop mainly along the proximal aspect of the fifth metatarsal with some pain and swelling to that area.  She reported that the first toe wound had healed and the fourth toe wound was healing but this had been more painful.  She did not really have pain at the proximal aspect of the hindfoot.  She did that the piece of glass that she thought she had been her foot \"worked itself out \"     At that time felt that she had new injury with probable right proximal fifth metatarsal avulsion fracture of the peroneus brevis insertion for possible peroneus longus injury with mild retraction of the os peroneum.  She will hold off on MRI and I did not feel that it was going to change her treatment protocol as we would not plan on doing any surgery there in conjunction with the other surgery on her foot.  She was going get back into the boot that she already had and limit activity is much as possible we held off on rescheduling her forefoot surgery until her toe wound was healed.     Patient was seen on 9/23/2024 stating that the area in her right lateral foot felt much better and was not really having appreciable pain there.  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  She still had the same symptoms that she had originally around her right first and second MTP joints with pain there of.     We discussed treatment going forward and plan on proceeding with right first and second toe surgery with first MTP fusion with possible cadaver graft and hallux valgus correction as well as second MTP debridement with possible interpositional arthroplasty and PIP and possibly DIP joint fusion     Her surgery has been scheduled for mid to late October but due to an issue that I had surgery was canceled and we mutually decided to postpone it until " January.     Patient was seen on 12/16/2024 reporting that she injured her left foot where she had previous first MTP fusion on 12/15/2024.  She was trying to cover her car in [place] with a tarp and bungee beneath it.  She twisted and she said her foot moved but her shoe did not work and felt a pop with onset of persistent worsening severe pain more proximal aspect of the left first metatarsal then distally.  She had had to walk on the side of her foot and used some hydrocodone that she had from previous surgery.     She is felt to have new onset left first metatarsal fracture proximal to previous fusion plate without displacement.  We discussed treatment going forward and discussed a CT scan but did not feel would change her treatment protocol at that time and did seem to be the area of irregularity on x-ray that corresponds to her area of pain.  I did not see any evidence of disruption of the fusion site and no fracture was displaced.  She was fitted with a short boot and instructed on limiting weightbearing and doing foot flat weightbearing with a walker and using accommodative arch support in there.  She was to sleep in a postoperative shoe.  She was to been seen back 3 weeks thereafter.  Her appointment on 1/6/2024 was canceled due to weather.     Patient seen on 2/3/2025 reporting that her right foot felt okay.  She had her boot for about 4 weeks and then used her postoperative shoe for another 2 weeks and then had been going without it for about the previous 10 days using sturdy stiff soled shoes and reported that her left foot was feeling better.  She reported persistent pain around the right first MTP joint and second MTP joint with some deviation and pain to the second toe at PIP and DIP joints.  HPI     ROS: Foot pain  Medical History        Past Medical History:   Diagnosis Date    Abnormal Pap smear of cervix      Arthritis      Breast cancer metastasized to axillary lymph node, right 03/07/2014     "Bunion       BILATERAL    Cervical dysplasia       S/P LEEP, continued abnl, TLH    Colon polyp      Foot fracture, right      Foot pain      Herpes       HSV 1 by blood    History of depression      Itchy skin       RIGHT ARM    Low back pain      Migraines       With aura    Osteoporosis      Peripheral neuropathy      Pinched nerve in neck      PONV (postoperative nausea and vomiting)      Sleep apnea treated with continuous positive airway pressure (CPAP)      Thyroid cancer       h/o thyroid cancer    Urogenital trichomoniasis           Physical Exam:   Vitals       Vitals:     02/03/25 0829   Temp: 96.8 °F (36 °C)   Weight: 62.1 kg (137 lb)   Height: 170.2 cm (67\")   PainSc:   3      Performed 2/3/2025  Well developed with normal mood.  On exam she was nontender over the left foot along first metatarsal.  Right foot shows hallux valgus deformity that is passively correctable with pain with global pain around the first MTP joint with range of motion.  Does have some hammering of the second toe with tenderness and lateral deviation of the PIP more so the DIP joint with some callus over the medial aspect of the PIP joint without ulceration.  There was limited motion of the second MTP joint with mild discomfort.        Radiology: 3 views of both feet ordered evaluate pain and alignment reviewed and compared to previous x-rays left foot shows good alignment to the fusion and alignment of the fusion hardware the fracture proximal to this appears to be healing and is without displacement there is increased callus formation     Right foot continues to show hallux valgus with first MTP joint narrowing as well as significant loss of joint space at the second MTP joint with irregularity of the second metatarsal head and lateral deviation at the PIP joint of the second toe.      CT scan films and report of the right foot dated 5/21/2024 reviewed which shows degenerative subchondral cystic changes of the great toe " metatarsal head with chronic subchondral flattening and deformity at the second metatarsal head and second toe proximal phalangeal base.  There is hallux valgus formerly with severe DJD of the great toe MTP joint with chronic well-corticated ossification at the plantar joint line.  There is severe degenerative changes second MTP joint with osteophyte formation and subcentimeter osteochondral bodies as well as severe degenerative change of the second toe PIP joint.        Assessment/Plan:  Status post left foot surgery as outlined above      1.  Right hallux valgus with first MTP arthritis  2.  Right second MTP arthritis with deformity of second metatarsal head and PIP/DIP deformity  3.  Left first MTP arthritis with hallux valgus.  4. probable right proximal fifth metatarsal avulsion fracture at peroneus brevis insertion and/or possible peroneus longus injury with mild retraction of the os peroneum  5.  left first metatarsal fracture proximal to previous fusion plate without displacement improved     I am encouraged that her left foot is improved and she will continue with sturdy accommodative shoes.     2/3/2025 reviewed that we would plan to proceed with right foot surgery as outlined previously and again reviewed with her in detail that they with associated risk benefits potential outcomes and complications.     On 2/3/2025 discussed treatment going forward and she wished to pursue surgical treatment on the right foot understanding that although she had good outcome on the left that does not guarantee we would have good outcome on the right but I felt that she would do well.  Reviewed that we would plan on right first MTP hallux valgus correction and fusion with possible calcaneal bone grafting and hallux valgus correction as well as second MTP debridement and release and PIP and possibly DIP resection/fusion.     Patient voiced clear understanding of the operative procedure and postoperative course with  associated risk benefits potential outcomes and complications which can include but not limited to heart attack stroke death pneumonia infection bleeding damage to blood vessels nerves or tendons blood clots pulmonary embolism persistent or worsening pain stiffness instability malunion nonunion need for subsequent surgery and failure to return to presurgery and precondition levels of activity as well as wound healing complication and loss of the toes.     All of her questions were answered to her full satisfaction and we will plan to proceed with this on outpatient basis at a mutually convenient time.  She was encouraged to call if she has any questions prior to surgery.  She expressed that would like to do this sometime after [place].     Copied text in this note has been reviewed by me and is accurate as of  05/02/25 .

## 2025-05-05 ENCOUNTER — TELEPHONE (OUTPATIENT)
Dept: ORTHOPEDIC SURGERY | Facility: CLINIC | Age: 60
End: 2025-05-05

## 2025-05-05 ENCOUNTER — ANESTHESIA EVENT (OUTPATIENT)
Dept: PERIOP | Facility: HOSPITAL | Age: 60
End: 2025-05-05
Payer: COMMERCIAL

## 2025-05-05 NOTE — TELEPHONE ENCOUNTER
Hub staff attempted to follow warm transfer process and was unsuccessful     Caller: MANJINDER     Relationship to patient: [facility]     Best call back number: [phone]    Patient is needing: THIS PATIENT HAS SURGERY TOMORROW AND THERE IS NOT AN ARRIVAL TIME IN THE COMPUTER. SHE HAS BEEN TRYING TO REACH SOMEONE IN THE OFFICE WITHOUT SUCCESS. PLEASE CALL TO CONFIRM.

## 2025-05-06 ENCOUNTER — ANESTHESIA (OUTPATIENT)
Dept: PERIOP | Facility: HOSPITAL | Age: 60
End: 2025-05-06
Payer: COMMERCIAL

## 2025-05-06 ENCOUNTER — APPOINTMENT (OUTPATIENT)
Dept: GENERAL RADIOLOGY | Facility: HOSPITAL | Age: 60
End: 2025-05-06
Payer: COMMERCIAL

## 2025-05-06 ENCOUNTER — HOSPITAL ENCOUNTER (OUTPATIENT)
Facility: HOSPITAL | Age: 60
Setting detail: HOSPITAL OUTPATIENT SURGERY
Discharge: HOME | End: 2025-05-06
Attending: ORTHOPAEDIC SURGERY | Admitting: ORTHOPAEDIC SURGERY
Payer: COMMERCIAL

## 2025-05-06 VITALS
SYSTOLIC BLOOD PRESSURE: 138 MMHG | TEMPERATURE: 97.7 F | WEIGHT: 136.91 LBS | BODY MASS INDEX: 21.49 KG/M2 | HEIGHT: 67 IN | HEART RATE: 80 BPM | DIASTOLIC BLOOD PRESSURE: 84 MMHG | RESPIRATION RATE: 17 BRPM | OXYGEN SATURATION: 99 %

## 2025-05-06 DIAGNOSIS — M19.071 ARTHRITIS OF FIRST METATARSOPHALANGEAL (MTP) JOINT OF RIGHT FOOT: ICD-10-CM

## 2025-05-06 DIAGNOSIS — M92.71 FREIBERG'S INFRACTION, RIGHT: ICD-10-CM

## 2025-05-06 DIAGNOSIS — M20.41 HAMMER TOE OF RIGHT FOOT: ICD-10-CM

## 2025-05-06 DIAGNOSIS — M20.11 HALLUX VALGUS OF RIGHT FOOT: ICD-10-CM

## 2025-05-06 PROCEDURE — 25010000002 CEFAZOLIN PER 500 MG: Performed by: ORTHOPAEDIC SURGERY

## 2025-05-06 PROCEDURE — C1713 ANCHOR/SCREW BN/BN,TIS/BN: HCPCS | Performed by: ORTHOPAEDIC SURGERY

## 2025-05-06 PROCEDURE — 25010000002 FAMOTIDINE 10 MG/ML SOLUTION: Performed by: ANESTHESIOLOGY

## 2025-05-06 PROCEDURE — C1762 CONN TISS, HUMAN(INC FASCIA): HCPCS | Performed by: ORTHOPAEDIC SURGERY

## 2025-05-06 PROCEDURE — C1769 GUIDE WIRE: HCPCS | Performed by: ORTHOPAEDIC SURGERY

## 2025-05-06 PROCEDURE — 25010000002 ROPIVACAINE PER 1 MG: Performed by: ANESTHESIOLOGY

## 2025-05-06 PROCEDURE — 25010000002 LIDOCAINE 2% SOLUTION: Performed by: NURSE ANESTHETIST, CERTIFIED REGISTERED

## 2025-05-06 PROCEDURE — 25010000002 PROPOFOL 10 MG/ML EMULSION: Performed by: NURSE ANESTHETIST, CERTIFIED REGISTERED

## 2025-05-06 PROCEDURE — 25010000002 BUPIVACAINE PF 0.75 % SOLUTION: Performed by: ANESTHESIOLOGY

## 2025-05-06 PROCEDURE — 25010000002 MIDAZOLAM PER 1 MG: Performed by: ANESTHESIOLOGY

## 2025-05-06 PROCEDURE — 25010000002 ONDANSETRON PER 1 MG: Performed by: NURSE ANESTHETIST, CERTIFIED REGISTERED

## 2025-05-06 PROCEDURE — 76000 FLUOROSCOPY <1 HR PHYS/QHP: CPT

## 2025-05-06 PROCEDURE — 25010000002 DEXAMETHASONE PER 1 MG: Performed by: ANESTHESIOLOGY

## 2025-05-06 PROCEDURE — 25810000003 LACTATED RINGERS PER 1000 ML: Performed by: ANESTHESIOLOGY

## 2025-05-06 DEVICE — SCRW COMPR KREULOCK VA LK FUL/THRD TI 3X16MM: Type: IMPLANTABLE DEVICE | Site: TOES | Status: FUNCTIONAL

## 2025-05-06 DEVICE — SCRW CORT 3X16MM: Type: IMPLANTABLE DEVICE | Site: TOES | Status: FUNCTIONAL

## 2025-05-06 DEVICE — IMPLANTABLE DEVICE: Type: IMPLANTABLE DEVICE | Site: TOES | Status: FUNCTIONAL

## 2025-05-06 DEVICE — SCRW COMPR KREULOCK VA LK FUL/THRD TI 3X18MM: Type: IMPLANTABLE DEVICE | Site: TOES | Status: FUNCTIONAL

## 2025-05-06 DEVICE — SCRW COMPR NOHD FUL/THRD TI MICRO 2.5X24MM: Type: IMPLANTABLE DEVICE | Site: TOES | Status: FUNCTIONAL

## 2025-05-06 DEVICE — SCRW COMPR KREULOCK VA LK FUL/THRD TI 3X14MM: Type: IMPLANTABLE DEVICE | Site: TOES | Status: FUNCTIONAL

## 2025-05-06 DEVICE — SCRW COMPR KREULOCK VA LK FUL/THRD TI 3X12MM: Type: IMPLANTABLE DEVICE | Site: TOES | Status: FUNCTIONAL

## 2025-05-06 RX ORDER — ONDANSETRON 2 MG/ML
4 INJECTION INTRAMUSCULAR; INTRAVENOUS ONCE AS NEEDED
Status: DISCONTINUED | OUTPATIENT
Start: 2025-05-06 | End: 2025-05-06 | Stop reason: HOSPADM

## 2025-05-06 RX ORDER — FAMOTIDINE 10 MG/ML
20 INJECTION, SOLUTION INTRAVENOUS ONCE
Status: COMPLETED | OUTPATIENT
Start: 2025-05-06 | End: 2025-05-06

## 2025-05-06 RX ORDER — HYDROCODONE BITARTRATE AND ACETAMINOPHEN 5; 325 MG/1; MG/1
1 TABLET ORAL ONCE AS NEEDED
Status: DISCONTINUED | OUTPATIENT
Start: 2025-05-06 | End: 2025-05-06 | Stop reason: HOSPADM

## 2025-05-06 RX ORDER — IPRATROPIUM BROMIDE AND ALBUTEROL SULFATE 2.5; .5 MG/3ML; MG/3ML
3 SOLUTION RESPIRATORY (INHALATION) ONCE AS NEEDED
Status: DISCONTINUED | OUTPATIENT
Start: 2025-05-06 | End: 2025-05-06 | Stop reason: HOSPADM

## 2025-05-06 RX ORDER — LIDOCAINE HYDROCHLORIDE 20 MG/ML
INJECTION, SOLUTION INFILTRATION; PERINEURAL AS NEEDED
Status: DISCONTINUED | OUTPATIENT
Start: 2025-05-06 | End: 2025-05-06 | Stop reason: SURG

## 2025-05-06 RX ORDER — SODIUM CHLORIDE, SODIUM LACTATE, POTASSIUM CHLORIDE, CALCIUM CHLORIDE 600; 310; 30; 20 MG/100ML; MG/100ML; MG/100ML; MG/100ML
9 INJECTION, SOLUTION INTRAVENOUS CONTINUOUS
Status: DISCONTINUED | OUTPATIENT
Start: 2025-05-06 | End: 2025-05-06 | Stop reason: HOSPADM

## 2025-05-06 RX ORDER — HYDROCODONE BITARTRATE AND ACETAMINOPHEN 7.5; 325 MG/1; MG/1
1 TABLET ORAL ONCE AS NEEDED
Refills: 0 | Status: DISCONTINUED | OUTPATIENT
Start: 2025-05-06 | End: 2025-05-06 | Stop reason: HOSPADM

## 2025-05-06 RX ORDER — SODIUM CHLORIDE 0.9 % (FLUSH) 0.9 %
3 SYRINGE (ML) INJECTION EVERY 12 HOURS SCHEDULED
Status: DISCONTINUED | OUTPATIENT
Start: 2025-05-06 | End: 2025-05-06 | Stop reason: HOSPADM

## 2025-05-06 RX ORDER — FENTANYL CITRATE 50 UG/ML
50 INJECTION, SOLUTION INTRAMUSCULAR; INTRAVENOUS
Status: DISCONTINUED | OUTPATIENT
Start: 2025-05-06 | End: 2025-05-06 | Stop reason: HOSPADM

## 2025-05-06 RX ORDER — KETOROLAC TROMETHAMINE 5 MG/ML
1 SOLUTION OPHTHALMIC EVERY 4 HOURS PRN
Status: DISCONTINUED | OUTPATIENT
Start: 2025-05-06 | End: 2025-05-06 | Stop reason: HOSPADM

## 2025-05-06 RX ORDER — MIDAZOLAM HYDROCHLORIDE 1 MG/ML
1 INJECTION, SOLUTION INTRAMUSCULAR; INTRAVENOUS
Status: DISCONTINUED | OUTPATIENT
Start: 2025-05-06 | End: 2025-05-06 | Stop reason: HOSPADM

## 2025-05-06 RX ORDER — FENTANYL CITRATE 50 UG/ML
50 INJECTION, SOLUTION INTRAMUSCULAR; INTRAVENOUS ONCE AS NEEDED
Status: DISCONTINUED | OUTPATIENT
Start: 2025-05-06 | End: 2025-05-06

## 2025-05-06 RX ORDER — PROPOFOL 10 MG/ML
VIAL (ML) INTRAVENOUS AS NEEDED
Status: DISCONTINUED | OUTPATIENT
Start: 2025-05-06 | End: 2025-05-06 | Stop reason: SURG

## 2025-05-06 RX ORDER — HYDROCODONE BITARTRATE AND ACETAMINOPHEN 7.5; 325 MG/1; MG/1
1 TABLET ORAL EVERY 6 HOURS PRN
Qty: 42 TABLET | Refills: 0 | Status: SHIPPED | OUTPATIENT
Start: 2025-05-06

## 2025-05-06 RX ORDER — DROPERIDOL 2.5 MG/ML
0.62 INJECTION, SOLUTION INTRAMUSCULAR; INTRAVENOUS
Status: DISCONTINUED | OUTPATIENT
Start: 2025-05-06 | End: 2025-05-06 | Stop reason: HOSPADM

## 2025-05-06 RX ORDER — NALOXONE HCL 0.4 MG/ML
0.2 VIAL (ML) INJECTION AS NEEDED
Status: DISCONTINUED | OUTPATIENT
Start: 2025-05-06 | End: 2025-05-06 | Stop reason: HOSPADM

## 2025-05-06 RX ORDER — FLUMAZENIL 0.1 MG/ML
0.2 INJECTION INTRAVENOUS AS NEEDED
Status: DISCONTINUED | OUTPATIENT
Start: 2025-05-06 | End: 2025-05-06 | Stop reason: HOSPADM

## 2025-05-06 RX ORDER — FAMOTIDINE 10 MG/ML
20 INJECTION, SOLUTION INTRAVENOUS ONCE
Status: DISCONTINUED | OUTPATIENT
Start: 2025-05-06 | End: 2025-05-06 | Stop reason: HOSPADM

## 2025-05-06 RX ORDER — SODIUM CHLORIDE 0.9 % (FLUSH) 0.9 %
3-10 SYRINGE (ML) INJECTION AS NEEDED
Status: DISCONTINUED | OUTPATIENT
Start: 2025-05-06 | End: 2025-05-06 | Stop reason: HOSPADM

## 2025-05-06 RX ORDER — LIDOCAINE HYDROCHLORIDE 10 MG/ML
0.5 INJECTION, SOLUTION INFILTRATION; PERINEURAL ONCE AS NEEDED
Status: DISCONTINUED | OUTPATIENT
Start: 2025-05-06 | End: 2025-05-06 | Stop reason: HOSPADM

## 2025-05-06 RX ORDER — ROPIVACAINE HYDROCHLORIDE 5 MG/ML
INJECTION, SOLUTION EPIDURAL; INFILTRATION; PERINEURAL
Status: COMPLETED | OUTPATIENT
Start: 2025-05-06 | End: 2025-05-06

## 2025-05-06 RX ORDER — LABETALOL HYDROCHLORIDE 5 MG/ML
5 INJECTION, SOLUTION INTRAVENOUS
Status: DISCONTINUED | OUTPATIENT
Start: 2025-05-06 | End: 2025-05-06 | Stop reason: HOSPADM

## 2025-05-06 RX ORDER — EPHEDRINE SULFATE 50 MG/ML
5 INJECTION, SOLUTION INTRAVENOUS ONCE AS NEEDED
Status: DISCONTINUED | OUTPATIENT
Start: 2025-05-06 | End: 2025-05-06 | Stop reason: HOSPADM

## 2025-05-06 RX ORDER — OXYCODONE AND ACETAMINOPHEN 7.5; 325 MG/1; MG/1
1 TABLET ORAL EVERY 4 HOURS PRN
Status: DISCONTINUED | OUTPATIENT
Start: 2025-05-06 | End: 2025-05-06 | Stop reason: HOSPADM

## 2025-05-06 RX ORDER — FENTANYL CITRATE 50 UG/ML
25 INJECTION, SOLUTION INTRAMUSCULAR; INTRAVENOUS ONCE AS NEEDED
Status: DISCONTINUED | OUTPATIENT
Start: 2025-05-06 | End: 2025-05-06

## 2025-05-06 RX ORDER — PROPARACAINE HYDROCHLORIDE 5 MG/ML
1 SOLUTION/ DROPS OPHTHALMIC ONCE
Status: COMPLETED | OUTPATIENT
Start: 2025-05-06 | End: 2025-05-06

## 2025-05-06 RX ORDER — HYDRALAZINE HYDROCHLORIDE 20 MG/ML
5 INJECTION INTRAMUSCULAR; INTRAVENOUS
Status: DISCONTINUED | OUTPATIENT
Start: 2025-05-06 | End: 2025-05-06 | Stop reason: HOSPADM

## 2025-05-06 RX ORDER — DIPHENHYDRAMINE HYDROCHLORIDE 50 MG/ML
12.5 INJECTION, SOLUTION INTRAMUSCULAR; INTRAVENOUS
Status: DISCONTINUED | OUTPATIENT
Start: 2025-05-06 | End: 2025-05-06 | Stop reason: HOSPADM

## 2025-05-06 RX ORDER — ONDANSETRON 2 MG/ML
INJECTION INTRAMUSCULAR; INTRAVENOUS AS NEEDED
Status: DISCONTINUED | OUTPATIENT
Start: 2025-05-06 | End: 2025-05-06 | Stop reason: SURG

## 2025-05-06 RX ORDER — GENTAMICIN SULFATE 3 MG/ML
1 SOLUTION/ DROPS OPHTHALMIC
Status: DISCONTINUED | OUTPATIENT
Start: 2025-05-06 | End: 2025-05-06 | Stop reason: HOSPADM

## 2025-05-06 RX ORDER — CEPHALEXIN 500 MG/1
500 CAPSULE ORAL EVERY 6 HOURS
Qty: 8 CAPSULE | Refills: 0 | Status: SHIPPED | OUTPATIENT
Start: 2025-05-06 | End: 2025-05-08

## 2025-05-06 RX ORDER — DEXAMETHASONE SODIUM PHOSPHATE 4 MG/ML
INJECTION, SOLUTION INTRA-ARTICULAR; INTRALESIONAL; INTRAMUSCULAR; INTRAVENOUS; SOFT TISSUE
Status: COMPLETED | OUTPATIENT
Start: 2025-05-06 | End: 2025-05-06

## 2025-05-06 RX ORDER — ATROPINE SULFATE 0.4 MG/ML
0.4 INJECTION, SOLUTION INTRAMUSCULAR; INTRAVENOUS; SUBCUTANEOUS ONCE AS NEEDED
Status: DISCONTINUED | OUTPATIENT
Start: 2025-05-06 | End: 2025-05-06 | Stop reason: HOSPADM

## 2025-05-06 RX ORDER — HYDROMORPHONE HYDROCHLORIDE 1 MG/ML
0.5 INJECTION, SOLUTION INTRAMUSCULAR; INTRAVENOUS; SUBCUTANEOUS
Status: DISCONTINUED | OUTPATIENT
Start: 2025-05-06 | End: 2025-05-06 | Stop reason: HOSPADM

## 2025-05-06 RX ORDER — PROMETHAZINE HYDROCHLORIDE 25 MG/1
25 SUPPOSITORY RECTAL ONCE AS NEEDED
Status: DISCONTINUED | OUTPATIENT
Start: 2025-05-06 | End: 2025-05-06 | Stop reason: HOSPADM

## 2025-05-06 RX ORDER — SENNOSIDES 8.6 MG
325 CAPSULE ORAL DAILY
Qty: 30 TABLET | Refills: 0 | Status: SHIPPED | OUTPATIENT
Start: 2025-05-07

## 2025-05-06 RX ORDER — BUPIVACAINE HYDROCHLORIDE 7.5 MG/ML
INJECTION, SOLUTION EPIDURAL; RETROBULBAR
Status: COMPLETED | OUTPATIENT
Start: 2025-05-06 | End: 2025-05-06

## 2025-05-06 RX ORDER — PROMETHAZINE HYDROCHLORIDE 25 MG/1
25 TABLET ORAL ONCE AS NEEDED
Status: DISCONTINUED | OUTPATIENT
Start: 2025-05-06 | End: 2025-05-06 | Stop reason: HOSPADM

## 2025-05-06 RX ADMIN — PROPOFOL 100 MCG/KG/MIN: 10 INJECTION, EMULSION INTRAVENOUS at 08:32

## 2025-05-06 RX ADMIN — PROPOFOL 50 MG: 10 INJECTION, EMULSION INTRAVENOUS at 08:32

## 2025-05-06 RX ADMIN — KETOROLAC TROMETHAMINE 1 DROP: 5 SOLUTION/ DROPS OPHTHALMIC at 13:47

## 2025-05-06 RX ADMIN — ONDANSETRON 4 MG: 2 INJECTION, SOLUTION INTRAMUSCULAR; INTRAVENOUS at 11:16

## 2025-05-06 RX ADMIN — DEXAMETHASONE SODIUM PHOSPHATE 4 MG: 4 INJECTION, SOLUTION INTRA-ARTICULAR; INTRALESIONAL; INTRAMUSCULAR; INTRAVENOUS; SOFT TISSUE at 08:09

## 2025-05-06 RX ADMIN — GENTAMICIN SULFATE 1 DROP: 3 SOLUTION OPHTHALMIC at 12:55

## 2025-05-06 RX ADMIN — LIDOCAINE HYDROCHLORIDE 40 MG: 20 INJECTION, SOLUTION INFILTRATION; PERINEURAL at 08:32

## 2025-05-06 RX ADMIN — PROPARACAINE HYDROCHLORIDE 1 DROP: 5 SOLUTION/ DROPS OPHTHALMIC at 12:30

## 2025-05-06 RX ADMIN — ROPIVACAINE HYDROCHLORIDE 25 ML: 5 INJECTION EPIDURAL; INFILTRATION; PERINEURAL at 08:09

## 2025-05-06 RX ADMIN — SODIUM CHLORIDE, POTASSIUM CHLORIDE, SODIUM LACTATE AND CALCIUM CHLORIDE: 600; 310; 30; 20 INJECTION, SOLUTION INTRAVENOUS at 08:32

## 2025-05-06 RX ADMIN — HYDROCODONE BITARTRATE AND ACETAMINOPHEN 1 TABLET: 7.5; 325 TABLET ORAL at 12:02

## 2025-05-06 RX ADMIN — FAMOTIDINE 20 MG: 10 INJECTION INTRAVENOUS at 07:53

## 2025-05-06 RX ADMIN — CEFAZOLIN 2 G: 2 INJECTION, POWDER, FOR SOLUTION INTRAMUSCULAR; INTRAVENOUS at 08:14

## 2025-05-06 RX ADMIN — MIDAZOLAM 1 MG: 1 INJECTION INTRAMUSCULAR; INTRAVENOUS at 08:04

## 2025-05-06 RX ADMIN — BUPIVACAINE HYDROCHLORIDE 1.6 ML: 7.5 INJECTION, SOLUTION EPIDURAL; RETROBULBAR at 08:28

## 2025-05-06 NOTE — ANESTHESIA POSTPROCEDURE EVALUATION
Patient: Cinthya Ayoub    Procedure Summary       Date: 05/06/25 Room / Location: [facility] OR [address] [place] [address] [facility]    Anesthesia Start: 0821 Anesthesia Stop:     Procedure: Right foot bunion correction and first metatarsophalangeal joint fusion.  Right second metatarsophalangeal joint release and debridement with interpositional arthroplasty.  Right second proximal and distal interphalangeal joint resection/fusion - Right (Right: Toes) Diagnosis:       Arthritis of first metatarsophalangeal (MTP) joint of right foot      Freiberg's infraction, right      Hammer toe of right foot      Hallux valgus of right foot      (Arthritis of first metatarsophalangeal (MTP) joint of right foot [M19.071])      (Freiberg's infraction, right [M92.71])      (Hammer toe of right foot [M20.41])      (Hallux valgus of right foot [M20.11])    Surgeons: Mickey Nevarez MD Provider: Dann Matthews MD    Anesthesia Type: spinal ASA Status: 2            Anesthesia Type: spinal    Vitals  Vitals Value Taken Time   /91 05/06/25 13:00   Temp 36.5 °C (97.7 °F) 05/06/25 11:45   Pulse 74 05/06/25 13:09   Resp 11 05/06/25 12:00   SpO2 99 % 05/06/25 13:09   Vitals shown include unfiled device data.        Post Anesthesia Care and Evaluation    Patient location during evaluation: PACU  Patient participation: complete - patient participated  Level of consciousness: awake  Pain management: adequate    Airway patency: patent  Anesthetic complications: No anesthetic complications  PONV Status: none  Cardiovascular status: acceptable  Respiratory status: acceptable  Hydration status: acceptable

## 2025-05-06 NOTE — ANESTHESIA PROCEDURE NOTES
Spinal Block      Patient reassessed immediately prior to procedure    Patient location during procedure: OR  Start Time: 5/6/2025 8:25 AM  Stop Time: 5/6/2025 8:28 AM  Performed By  Anesthesiologist: Tania Moran MD  Preanesthetic Checklist  Completed: patient identified, IV checked, site marked, risks and benefits discussed, surgical consent, monitors and equipment checked, pre-op evaluation and timeout performed  Spinal Block Prep:  Patient Position:sitting  Sterile Tech:cap, gloves, mask and sterile barriers  Prep:Chloraprep  Patient Monitoring:blood pressure monitoring, continuous pulse oximetry and EKG    Spinal Block Procedure  Approach:midline  Guidance:palpation technique  Location:L4-L5  Needle Type:Jarrett  Needle Gauge:25 G  Placement of Spinal needle event:cerebrospinal fluid aspirated  Paresthesia: no  Fluid Appearance:clear  Medications: bupivacaine PF (MARCAINE) 0.75 % injection - Spinal   1.6 mL - 5/6/2025 8:28:00 AM   Post Assessment  Patient Tolerance:patient tolerated the procedure well with no apparent complications  Complications no  Additional Notes  Significant scoliosis, no paresthesias with accessing IT space, clear CSF asp x 2 swirl

## 2025-05-06 NOTE — ANESTHESIA PREPROCEDURE EVALUATION
Anesthesia Evaluation     Patient summary reviewed and Nursing notes reviewed   history of anesthetic complications (severe PONV, corneal abrasion last surgery):  PONV  NPO Solid Status: > 8 hours  NPO Liquid Status: > 2 hours           Airway   Mallampati: I  TM distance: >3 FB  Neck ROM: full  No difficulty expected  Dental - normal exam     Pulmonary    (+) ,sleep apnea  Cardiovascular     ECG reviewed        Neuro/Psych  (+) headaches, numbness (periopheral nueropathy)  GI/Hepatic/Renal/Endo    (+) thyroid problem thyroid cancer    Musculoskeletal     Abdominal    Substance History      OB/GYN          Other   arthritis,   history of cancer (right breast, thyroid)    ROS/Med Hx Other: 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 I have explained the risks of anesthesia including but not limited to dental damage, corneal abrasion, nerve injury, MI, stroke, aspiration, and death. Patient has agreed to proceed.      Risks of peripheral nerve block were discussed with patient including but not limited to: inadequate block, bleeding, infection, persistent numbness or weakness, nerve damage, painful dysasthesia and need to protect limb while numb.      /85 (BP Location: Left arm, Patient Position: Sitting)   Pulse 79   Temp 36.3 °C (97.3 °F) (Oral)   Resp 18   LMP  (LMP Unknown)   SpO2 95%   )  intravenous induction     Anesthetic plan, risks, benefits, and alternatives have been provided, discussed and informed consent has been obtained with: patient.    CODE STATUS:

## 2025-05-06 NOTE — DISCHARGE INSTRUCTIONS
Corneal Abrasion Discharge Instructions        An eye patch is not needed.  Do not rub the affected eye.    Gentamicin 1-2 drops every 4 hours into affected eye for 1 day  If you have no improvement within the next 24 hours, please contact your eye doctor.

## 2025-05-06 NOTE — ANESTHESIA PROCEDURE NOTES
Popliteal nerve block      Patient reassessed immediately prior to procedure    Patient location during procedure: pre-op  Start time: 5/6/2025 8:05 AM  Stop time: 5/6/2025 8:09 AM  Reason for block: at surgeon's request and post-op pain management  Performed by  Anesthesiologist: Tania Moran MD  Preanesthetic Checklist  Completed: patient identified, IV checked, site marked, risks and benefits discussed, surgical consent, monitors and equipment checked, pre-op evaluation and timeout performed  Prep:  Pt Position: sitting  Sterile barriers:cap, gloves and mask  Prep: ChloraPrep  Patient monitoring: blood pressure monitoring, continuous pulse oximetry and EKG  Procedure    Sedation: yes  Performed under: local infiltration  Guidance:ultrasound guided    ULTRASOUND INTERPRETATION.  Using ultrasound guidance a 21 G gauge needle was placed in close proximity to the nerve, at which point, under ultrasound guidance anesthetic was injected in the area of the nerve and spread of the anesthesia was seen on ultrasound in close proximity thereto.  There were no abnormalities seen on ultrasound; a digital image was taken; and the patient tolerated the procedure with no complications. Images:still images obtained, printed/placed on chart    Laterality:right  Block Type:popliteal  Injection Technique:single-shot  Needle Type:short-bevel and echogenic  Needle Gauge:21 G  Resistance on Injection: none    Medications Used: ropivacaine (NAROPIN) 0.5 % injection - Injection   25 mL - 5/6/2025 8:09:00 AM  dexamethasone (DECADRON) injection - Injection   4 mg - 5/6/2025 8:09:00 AM      Medications  Comment:Ultrasound Interpretation:  Using ultrasound guidance the needle was placed in close proximity to the target nerve and anesthetic was injected in the area of the target nerve and/or bundles, and spread of the anesthetic was seen on ultrasound in close proximity thereto.  There were no abnormalities seen on ultrasound; a  digital image was taken; and the patient tolerated the procedure with no complications.    Block placed for postoperative pain control per surgeon request.       Post Assessment  Injection Assessment: negative aspiration for heme, no paresthesia on injection and incremental injection  Patient Tolerance:comfortable throughout block  Complications:no  Performed by: Tania Moran MD

## 2025-05-06 NOTE — BRIEF OP NOTE
TOE INTERPHALANGEAL JOINT/METATARSOPHALANGEAL JOINT FUSION  Progress Note    Cinthya Ayoub  5/6/2025    Pre-op Diagnosis:   Arthritis of first metatarsophalangeal (MTP) joint of right foot [M19.071]  Freiberg's infraction, right [M92.71]  Hammer toe of right foot [M20.41]  Hallux valgus of right foot [M20.11]       Post-Op Diagnosis Codes:     * Arthritis of first metatarsophalangeal (MTP) joint of right foot [M19.071]     * Freiberg's infraction, right [M92.71]     * Hammer toe of right foot [M20.41]     * Hallux valgus of right foot [M20.11]    Procedure(s):      Procedure(s):  Right foot bunion correction and first metatarsophalangeal joint fusion.  Right second metatarsophalangeal joint release and debridement with interpositional arthroplasty.  Right second proximal and distal interphalangeal joint resection/fusion - Right              Surgeon(s):  Mickey Nevarez MD    Anesthesia: General with Block    Staff:   Circulator: Jayme Hernández RN  Radiology Technologist: Glenn Perez Cynthia M  Scrub Person: Myles Cyr  Vendor Representative: [place] Ibrahim; Mickey Pandey       Estimated Blood Loss:  15 ml    Urine Voided: * No values recorded between 5/6/2025  8:21 AM and 5/6/2025 11:43 AM *    Specimens:                None      Drains: * No LDAs found *    Findings: see dict      Complications: none     min          Mickey Nevarez MD     Date: 5/6/2025  Time: 11:46 EDT

## 2025-05-11 NOTE — OP NOTE
Operative Note      [facility]  Patient Name: Cinthya Ayoub  YOB: 1965  Date: 5/6/25  Medical Record Number: [id number]      Pre-op Diagnosis:   1.  Right hallux valgus  2.  Right first metatarsal phalangeal joint arthritis  3.  Right second MTP contracture  4.  Right second metatarsophalangeal joint arthritis  5.  Right second hammertoe      Post-op Diagnosis:    1.  Right hallux valgus  2.  Right first metatarsal phalangeal joint arthritis  3.  Right second MTP contracture  4.  Right second metatarsophalangeal joint arthritis  5.  Right second hammertoe with PIP joint arthritis      Procedure(s):  1.  Right hallux valgus correction with distal lateral soft tissue release and medial eminence resection  2.  Right first metatarsophalangeal joint arthritis using Arthrex MaxForce compression plate  3.  Right second MTP release with extensor tendon lengthening  4.  Partial excision right second metatarsal head including dorsal osteophytes and plantar condylectomy  5.  Partial excision base right second proximal phalanx consisting of dorsal medial spur  6.  Right second metatarsal phalangeal joint interpositional arthroplasty using Arthrex amnion  7.  Right second PIP resection/fusion    Surgeon(s):  Mickey Nevarez MD    Anesthesia: General with Block  Anesthesiologist: Dann Matthews MD  CRNA: Florencia Duffy CRNA    Staff:   Circulator: Jayme Hernández RN; Christen Littlejohn RN  Radiology Technologist: Odalys Perez; Kimberlee Bailey  Scrub Person: Myles Cyr  Vendor Representative: [place] Ibrahim; Mickey Pandey  Assistants : none      Tourniquet time: 116 minutes        Estimated Blood Loss:   15 ml  Drains: None  Specimens: * No orders in the log *  Findings: See Dictation    Complications: None      Indications for Procedure: Patient has had a history of progressive hallux valgus deformity of the right first metatarsophalangeal joint as well as  arthritis.  She has had previous fusion on the contralateral side and did well with that with mutual decision made to proceed with treatment in a similar fashion on this side.  Also has had persistent worsening arthritis with multiple osteophytes and loose bodies in the right second metatarsophalangeal joint with contracture as well as deformity of the PIP and DIP joints of the second toe.  She presents for operative treatment with clear understanding of risk benefits potential outcomes and complications having failed to improve with conservative measures.              Description of Procedure: Preoperative informed consent and anesthesia evaluation were obtained.  IV antibiotics were administered and the surgical site was marked.  Block was administered by anesthesia.  Patient was brought to the operating room placed in supine position.  Anesthesia had decided on a spinal with IV sedation per patient request.  Spinal anesthesia was induced and IV sedation was administered.  Well-padded tourniquet was placed right proximal thigh.  Right foot and leg were prepped and draped in sterile fashion and surgical timeout was performed.    Right leg was exsanguinated and pneumatic tourniquet inflated to 250 mmHg.  Incision made over the dorsum of the first MTP joint after delineation of bony landmarks radiographically and clinically.  Full-thickness flaps were carefully elevated with care taken to mobilize protect subcutaneous veins and nerves were possible.  EHL tendon was identified and Within the sheath.  Capsulotomy was performed medial to this and full-thickness capsular flaps were elevated.  There was noted to be degenerative changes over the first metatarsal head especially plantarly as well as some mild degenerative change at the base of the first proximal phalanx.    The soft tissues were released from the lateral base of the first proximal phalanx and lateral sesamoid allowing for full soft tissue release laterally.   The medial eminence was then resected from the first metatarsal head in line with the medial shaft of the phalanx.  This was then contoured with a rongeur.  These bony fragments were passed off for later use.  I was then able to correct the hallux valgus deformity without residual need for further release or shortening.  However given the arthritic change it was best to proceed with fusion.    The remaining cartilage was removed from the first metatarsal head using a curette and rongeur down to subchondral bone.  Guidewire was then passed with the reamer and confirmed radiographically.  This was then reamed hemispherical he to 20 mm creating a nice hemispherical surface below the subchondral bone, guidewire was removed.  Wound was thoroughly irrigated.  Remaining cartilage was then removed from the base of the proximal phalanx in a similar fashion down to subchondral bone.  Guidewire was then passed and confirmed radiographically.  This was then sequentially reamed up to 20 mm with care taken to maintain containment.  Guidewire was then removed and wound was irrigated.  I was then able to oppose the fusion surfaces nicely with good hemispherical contact in nearly neutral alignment with about 5 degrees residual valgus so is not overcorrect this.    Joint was then again distracted and prepared using multiple fenestrations with a 0.062 mm K wire and osteotome heavily fenestrating and feathering the fusion surfaces.  I was able to harvest bone from the medial eminence and from some of the previous debridement did not feel she needed calcaneal bone graft.  The fusion site was then irrigated and sprayed with PRP.  Bone graft was packed within this and the toe was then provisionally aligned such that there was neutral rotation.  There was dorsiflexion such that there was about 3 mm of excursion of the pulp with placement of plantar flatplate and simulated standing position and about 5 to 7 degrees of valgus so did not  overcorrect this.  This was then provisionally pinned in this position and found to be well aligned in all planes.  The Arthrex 5 degree valgus 5 degree dorsiflexion MaxForce plate was then applied across this and contoured and sit nicely and was secured provisionally.  X-rays showed good alignment.  This was then secured distally with multiple locking screws and then proximally with the proprietary compression mechanism will removing provisional fixation and secured with a bicortical nonlocking screw as well as 2 additional locking screws.  X-rays showed good containment and alignment and clinically there was markedly improved alignment to the toe.  The guidewire for the Arthrex headless 2.5 millimeter screw was passed from the proximal medial aspect of the proximal phalanx into the lateral metatarsal head crossing the fusion site.  This was confirmed radiographically and secured with a screw after overreaming this providing additional compression and fixation across the joint.  X-rays show good alignment and containment of the hardware and clinically there was excellent alignment and compression across the fusion site wound was thoroughly irrigated    Attention was then turned to the second MTP joint.  Incision was made over this and carried down through subcutaneous tissue elevating full-thickness flaps with care taken to mobilize protect subcutaneous veins and nerves.  The extensor tendons were identified and mobilized.  Brevis was resected proximally and longus was lengthened in a Z fashion.  Dorsal capsulotomy was then performed improving the dorsal contracture.  There was however noted to be a loose ossicle over the dorsum of the MTP joint that was removed.  The metatarsal phalangeal joint was inspected there was found to be significant arthritic change over about two thirds of the metatarsal head with large dorsal osteophyte as well as hypertrophic cartilaginous tissue over the medial and lateral aspects of  the head.  This is all debrided with a rongeur down to to smoother surfaces.  There was still bony prominence plantarly.  The toe was plantarflexed and plantar condylectomy was performed using sagittal saw removing the fragment.  This decompressed the joint relatively well.  Also then removed a portion of the base of the proximal phalanx consisting of a large dorsal lateral osteophyte using a rongeur such that this was decompressed as well.  I did not want to do any further shortening osteotomy or further head resection so as not to induce transfer metatarsalgia if possible.  The wounds then thoroughly irrigated and I then made preparations for placement of an interpositional graft.    Prior to doing this I addressed the second PIP joint.  Skin and capsular ellipses were removed and full-thickness flaps were carefully elevated the collaterals were released and expose the distal aspect of the proximal phalanx with there was significant arthritic change with loss of cartilage there and at the base of the middle phalanx which was fairly sclerotic.  The distal portion of the second PIP joint was resected just below the condylar flare and a slightly angled bevel this was helped correct alignment.  Also resected small portion of the base of the middle phalanx to get down to better bone and for better alignment.  The toe was then aligned such that there was good contact across the PIP joint.  Although there had been some deformity to the DIP joint this was relatively corrected and I did not feel that it was necessary to do a separate fusion there and this also would minimize potential complications.  The wound was thoroughly irrigated.    Attention was then turned back to the MTP joint.  The amnion graft was then balled up into an anchovy type graft and sewed over all the surfaces with 4-0 FiberWire suture.  I left 2 tails of this and this was then passed through the plantar tissues and then tightened such that this  through the graft into the joint while holding distraction to this.  This was then tied over multiple areas and secured medially and laterally to the capsule.  The distraction was released and there was good alignment to the second PIP joint and had improved range of motion without extrusion of the graft.    A 0.062 mm K wire was then passed through the base of the middle phalanx out through the tip of the toe.  Second PIP joint was then aligned clinically and the PIP joint was then crossed pinned.  X-rays showed acceptable alignment and containment.  This was then passed across the MTP joint into the second metatarsal to maintain alignment while holding the toe in a simulated standing position.  Overall x-rays showed acceptable alignment and clinically there was good alignment to the toe.  Wounds were thoroughly irrigated.  The tourniquet was released and there was prompt return of capillary refill to the toes and hemostasis was obtained.  All wounds were thoroughly irrigated with dilute Betadine solution as had been done throughout the case    Attention was then turned to the first MTP wound.  The fusion site was sprayed with PRP and capsule was closed with 3-0 Vicryl suture.  Skin was closed with 3-0 nylon.    The second MTP wound was irrigated and the extensor longus was repaired with the foot in a similar to standing position with 3-0 Vicryl suture.  Skin was closed with 3-0 Vicryl and 3-0 nylon.  The PIP wound was irrigated and closed with 3-0 Vicryl using a central roll stitch incorporating extensor tendon and medial and lateral interrupted sutures.  The pin was bent clipped and capped.    Sterile dressing was applied and patient was placed into a forefoot and ankle bunion hammertoe spica dressing and postoperative shoe.  Patient was then awakened transferred to stretcher and taken recovery in stable condition

## 2025-05-13 ENCOUNTER — TELEPHONE (OUTPATIENT)
Dept: ORTHOPEDIC SURGERY | Facility: CLINIC | Age: 60
End: 2025-05-13

## 2025-05-13 ENCOUNTER — OFFICE VISIT (OUTPATIENT)
Dept: ORTHOPEDIC SURGERY | Facility: CLINIC | Age: 60
End: 2025-05-13
Payer: COMMERCIAL

## 2025-05-13 VITALS — BODY MASS INDEX: 21.66 KG/M2 | HEIGHT: 67 IN | TEMPERATURE: 97.1 F | WEIGHT: 138 LBS

## 2025-05-13 DIAGNOSIS — M20.41 HAMMER TOE OF RIGHT FOOT: ICD-10-CM

## 2025-05-13 DIAGNOSIS — R52 PAIN: ICD-10-CM

## 2025-05-13 DIAGNOSIS — M20.11 HALLUX VALGUS OF RIGHT FOOT: ICD-10-CM

## 2025-05-13 DIAGNOSIS — M92.71 FREIBERG'S INFRACTION, RIGHT: ICD-10-CM

## 2025-05-13 DIAGNOSIS — M19.071 ARTHRITIS OF FIRST METATARSOPHALANGEAL (MTP) JOINT OF RIGHT FOOT: Primary | ICD-10-CM

## 2025-05-13 NOTE — PROGRESS NOTES
"Foot Follow Up      Patient: Cinthya Ayoub    YOB: 1965 59 y.o. female    Chief Complaints: Foot     History of Present Illness: Patient underwent right foot hallux valgus correction and first MTP fusion without calcaneal bone graft as well as second MTP release with debridement of dorsal and plantar osteophytes with interpositional arthroplasty and PIP joint fusion on 5/6/2025.    Please see note from 2/3/2025 for details including previous left first MTP fusion, right proximal fifth metatarsal avulsion fracture with retraction of os peroneum and left first metatarsal fracture proximal to previous plate fixation    Patient was initially scheduled for follow-up appointment on 5/19/2025.  She notified me last night of some increased swelling and feeling of warmth and tingling in her toes after being at work.  We spoke again today and she is having some persistent symptoms after elevating overnight was brought in for further evaluation.    Patient reports today that she is not having appreciable pain but had an electric shock feeling over the anterior aspect of her foot and felt like it was just \"tight \".  She had been elevating and nonweightbearing has not had any fevers or chills.  Pain is rated at 0 out of 10  HPI    ROS: No foot pain  Past Medical History:   Diagnosis Date    Abnormal Pap smear of cervix     Arthritis     Breast cancer metastasized to axillary lymph node, right 03/07/2014    Bunion     RIGHT    Cervical dysplasia     S/P LEEP, continued abnl, TLH    Colon polyp     Foot fracture, right     Foot pain     Herpes     HSV 1 by blood    History of depression     Itchy skin     RIGHT ARM    Low back pain     Migraines     With aura    Osteoporosis     Peripheral neuropathy     Pinched nerve in neck     PONV (postoperative nausea and vomiting)     Sleep apnea treated with continuous positive airway pressure (CPAP)     UNABLE TO USE MACHINE    Thyroid cancer     h/o thyroid cancer    " "Urogenital trichomoniasis      Physical Exam:   Vitals:    05/13/25 1317   Temp: 97.1 °F (36.2 °C)   Weight: 62.6 kg (138 lb)   Height: 170.2 cm (67\")   PainSc: 0-No pain   PainLoc: Foot     Well developed with normal mood.  On exam there was some dried blood on her dressing that was removed.  Her incisions are healing without sign of infection that was no erythema and toes demonstrated good cap refill and were grossly sensate to light touch.  Calf was nontender without sign of DVT          Radiology: 3 views right foot ordered evaluate postoperative alignment reviewed and compared to previous x-rays these show good alignment to the first metatarsophalangeal joint fusion hardware is intact.  There is neutral alignment to the second MTP joint and PIP joint and hardware appears to remain intact.      Assessment/Plan: Post right foot surgery as outlined above    1.  Right hallux valgus with first MTP arthritis  2.  Right second MTP arthritis with deformity of second metatarsal head and PIP/DIP deformity  3.  Left first MTP arthritis with hallux valgus status post fusion  4. probable right proximal fifth metatarsal avulsion fracture at peroneus brevis insertion and/or possible peroneus longus injury with mild retraction of the os peroneum  5.  left first metatarsal fracture proximal to previous fusion plate without displacement improved    We discussed treatment going forward overall she seems pleased with her outcome and alignment to her toes.    Reassured her that I do not see any clear sign of infection at this time.  Wounds were cleaned with Betadine and sterile dressings were applied with new forefoot and ankle bunion hammertoe spica dressing.  She will continue with elevation and nonweightbearing and will switch to a square toe postoperative shoe    She has any recurrent persistent problems she will let me know otherwise I will see her back as scheduled next week with x-rays of her right foot.     "

## 2025-05-13 NOTE — TELEPHONE ENCOUNTER
"  Hub staff attempted to follow warm transfer process and was unsuccessful - NOT ABLE TO WARM TRANSFER     Caller: Cinthya Ayoub \"ARIANA\"    Relationship to patient: Self    Best call back number: [phone]    Patient is needing: PATIENT IS POST OP RIGHT FOOT SURGERY 05/06/2025 - PATIENT STATES IT IS WARM TO THE TOUCH AND VERY SWOLLEN, SHE TEXTED THE PROVIDER LAST NIGHT AND HE WOULD LIKE TO SEE HER TODAY AFTER SURGERY-     "

## 2025-05-19 ENCOUNTER — OFFICE VISIT (OUTPATIENT)
Dept: ORTHOPEDIC SURGERY | Facility: CLINIC | Age: 60
End: 2025-05-19
Payer: COMMERCIAL

## 2025-05-19 VITALS — BODY MASS INDEX: 20.92 KG/M2 | HEIGHT: 68 IN | TEMPERATURE: 97.7 F | WEIGHT: 138 LBS

## 2025-05-19 DIAGNOSIS — M19.071 ARTHRITIS OF FIRST METATARSOPHALANGEAL (MTP) JOINT OF RIGHT FOOT: Primary | ICD-10-CM

## 2025-05-19 DIAGNOSIS — M20.11 HALLUX VALGUS OF RIGHT FOOT: ICD-10-CM

## 2025-05-19 DIAGNOSIS — M92.71 FREIBERG'S INFRACTION, RIGHT: ICD-10-CM

## 2025-05-19 DIAGNOSIS — M20.41 HAMMER TOE OF RIGHT FOOT: ICD-10-CM

## 2025-05-19 DIAGNOSIS — R52 PAIN: ICD-10-CM

## 2025-05-19 RX ORDER — SENNOSIDES 8.6 MG
650 CAPSULE ORAL EVERY 8 HOURS PRN
COMMUNITY

## 2025-05-19 NOTE — PROGRESS NOTES
"   Foot follow-up      Patient: Cinthya Ayoub  YOB: 1965 59 y.o. female  Medical Record Number: [id number]    Chief Complaints: Foot swelling has gone down    History of Present Illness:    Patient underwent right foot hallux valgus correction and first MTP fusion without calcaneal bone graft as well as second MTP release with debridement of dorsal and plantar osteophytes with interpositional arthroplasty and PIP joint fusion on 5/6/2025.     Please see note from 2/3/2025 for details including previous left first MTP fusion, right proximal fifth metatarsal avulsion fracture with retraction of os peroneum and left first metatarsal fracture proximal to previous plate fixation     Patient was initially scheduled for follow-up appointment on 5/19/2025.  She notified me the night the night of 5/12/2025 of some increased swelling and feeling of warmth and tingling in her toes after being at work.  We spoke again on 5/13/2025 and she was having some persistent symptoms after elevating overnight was brought in for further evaluation.     Patient forted on 5/13/2025 that she was not having appreciable pain but had had an electric shock feeling over the anterior aspect of her foot and felt like it was just \"tight \".  She had been elevating and nonweightbearing and had not had any fevers or chills.  Pain was rated at 0 out of 10.    At that time her foot did not show any sign of infection and felt better after we changed her dressing.  There was no sign of any vascular compromise.  She was advised to continue with elevation and nonweightbearing and was switched to a square toe postoperative shoe.    Patient is seen back today reporting that her foot swelling has diminished and she is felt better after having her dressings changed at the previous visit.  She has been continue to elevate and has remained nonweightbearing.  Pain is rated at 1 out of 10    HPI    Allergies:   Allergies   Allergen Reactions    " "Ciprofloxacin Anaphylaxis, Rash and Unknown - High Severity     Bradycardia, HYPOTENSION, lost consciousness, low O2    Codeine Nausea And Vomiting, Rash and Confusion     \"Felt hyperactive and out of head, jittery\"      Levofloxacin Rash and Other (See Comments)     HYPOTENSION, Lost consciousness during chemo    Hydrocodone-Acetaminophen Nausea Only     MAKES HER DEPRESSED    Oxycodone-Acetaminophen Other (See Comments) and Mental Status Change     Makes patient cry and could not eat       Medications:   Current Outpatient Medications on File Prior to Visit   Medication Sig    anastrozole (ARIMIDEX) 1 MG tablet Take 1 tablet by mouth Daily.    aspirin 325 MG EC tablet Take 1 tablet by mouth Daily.    B Complex Vitamins (VITAMIN B COMPLEX) capsule capsule Take 2 capsules by mouth Daily. HOLD PER MD INSTR    BIOTIN PO Take 1 each by mouth Daily. HOLD PRIOR TO SURGERY    Cholecalciferol (VITAMIN D) 1000 units tablet Take 1 tablet by mouth Daily. HOLD PRIOR TO SURGERY    COLLAGEN PO Take 1 capsule by mouth Daily. HOLD PRIOR TO SURGERY    DULoxetine (CYMBALTA) 30 MG capsule Take 1 capsule by mouth Daily.    Estradiol (Imvexxy Maintenance Pack) 4 MCG insert Insert 1 capsule into the vagina 2 (Two) Times a Week.    gabapentin (NEURONTIN) 300 MG capsule Take 1 capsule by mouth 3 (Three) Times a Day.    levothyroxine sodium (TIROSINT) 88 MCG capsule Take 100 mcg by mouth Daily.    losartan (COZAAR) 25 MG tablet Take 1 tablet by mouth Daily. DO NOT TAKE FOR 24 HOURS BEFORE SURGERY    multivitamin (THERAGRAN) tablet tablet Take 1 tablet by mouth Daily. HOLD PER MD INSTR    promethazine (PHENERGAN) 25 MG tablet Take 1 tablet by mouth Every 8 (Eight) Hours As Needed.    SUMAtriptan (IMITREX) 100 MG tablet Take 1 tablet by mouth As Needed for Migraine.    ZOLMitriptan (ZOMIG) 5 MG nasal solution Administer 1 spray into the nostril(s) as directed by provider As Needed.    acetaminophen (TYLENOL) 650 MG 8 hr tablet Take 1 tablet " by mouth Every 8 (Eight) Hours As Needed for Mild Pain.    HYDROcodone-acetaminophen (NORCO) 7.5-325 MG per tablet Take 1 tablet by mouth Every 6 (Six) Hours As Needed for Moderate Pain. (Patient not taking: Reported on 5/19/2025)    ondansetron ODT (Zofran ODT) 4 MG disintegrating tablet Place 1 tablet on the tongue Every 6 (Six) Hours As Needed for Nausea or Vomiting for up to 20 doses. (Patient not taking: Reported on 5/19/2025)     No current facility-administered medications on file prior to visit.       Past Medical History:   Diagnosis Date    Abnormal Pap smear of cervix     Arthritis     Breast cancer metastasized to axillary lymph node, right 03/07/2014    Bunion     RIGHT    Cervical dysplasia     S/P LEEP, continued abnl, TLH    Colon polyp     Foot fracture, right     Foot pain     Herpes     HSV 1 by blood    History of depression     Itchy skin     RIGHT ARM    Low back pain     Migraines     With aura    Osteoporosis     Peripheral neuropathy     Pinched nerve in neck     PONV (postoperative nausea and vomiting)     Sleep apnea treated with continuous positive airway pressure (CPAP)     UNABLE TO USE MACHINE    Thyroid cancer     h/o thyroid cancer    Urogenital trichomoniasis      Past Surgical History:   Procedure Laterality Date    APPENDECTOMY      BREAST BIOPSY      CERVICAL BIOPSY  W/ LOOP ELECTRODE EXCISION      CERVICAL SPINE SURGERY      COLONOSCOPY N/A 08/17/2018    Procedure: COLONOSCOPY, polypectomy;  Surgeon: Danielle Morelos MD;  Location: [facility];  Service: Gastroenterology    ENDOSCOPY      HYSTERECTOMY      TLH/USO dysplasia    HYSTEROSCOPY  2008    KNEE ARTHROSCOPY Left 1982    LUMBAR SPINE SURGERY      MASTECTOMY      B mastectomy with TRAM flap reconstruction    OOPHORECTOMY      lsc BSO by gyn onc    OTHER SURGICAL HISTORY      lymph node transplant    TOE FUSION Left 01/16/2024    Procedure: Left first metatarsal phalangeal joint fusion with bone graft from heel;  Surgeon:  "Mickey Nevarez MD;  Location: [facility];  Service: Orthopedics;  Laterality: Left;    TOE FUSION Right 5/6/2025    Procedure: Right foot bunion correction and first metatarsophalangeal joint fusion.  Right second metatarsophalangeal joint release and debridement with interpositional arthroplasty.  Right second proximal and distal interphalangeal joint resection/fusion - Right;  Surgeon: Mickey Nevarez MD;  Location: [facility] [place];  Service: Orthopedics;  Laterality: Right;    TOTAL HIP ARTHROPLASTY Bilateral 04/01/2022 05/01/2022    TOTAL THYROIDECTOMY      follicular cancer     Social History     Occupational History    Occupation: ELECTED PROSECUTOR,    Tobacco Use    Smoking status: Never    Smokeless tobacco: Never   Vaping Use    Vaping status: Never Used   Substance and Sexual Activity    Alcohol use: Not Currently     Comment: socially     Drug use: No    Sexual activity: Yes     Partners: Male     Birth control/protection: Post-menopausal, Hysterectomy     Comment: hysterectomy      Social History     Social History Narrative    LIVES ALONE     Family History   Problem Relation Age of Onset    Colon cancer Father 74    Pulmonary embolism Mother     No Known Problems Brother     Breast cancer Maternal Great-Grandmother     Ovarian cancer Neg Hx     Deep vein thrombosis Neg Hx     Malig Hyperthermia Neg Hx     Uterine cancer Neg Hx        Review of Systems: 14 point review of systems performed, positive pertinent findings identified in HPI. All remaining systems negative     Review of Systems      Physical Exam:   Vitals:    05/19/25 0852   Temp: 97.7 °F (36.5 °C)   TempSrc: Temporal   Weight: 62.6 kg (138 lb)   Height: 172.7 cm (68\")   PainSc: 1    PainLoc: Foot     Physical Exam   Constitutional: pleasant, well developed   Eyes: sclera non icteric  Hearing : adequate for exam  Cardiovascular: palpable pulses in right foot, right calf/ thigh NT without sign of " DVT  Respiratoy: breathing unlabored   Neurological: grossly sensate to LT throughout right LE  Psychiatric: oriented with normal mood and affect.   Lymphatic: No palpable popliteal lymphadenopathy right LE  Skin: intact throughout right leg/foot  Musculoskeletal: On exam her right foot as well as 1st and 2nd toe swelling have diminished and there is no sign of infection.  There is neutral alignment to the 1st and 2nd toes      Physical Exam  Ortho Exam    Radiology: 3 views right foot ordered evaluate postoperative alignment reviewed and compared to previous x-rays good alignment to the first metatarsal phalangeal joint fusion and at the second toe at the MTP and PIP joints.  Nothing is changed in alignment compared with previous x-rays.  Hardware remains intact.    Assessment/Plan: Status post right foot surgery as outlined above     1.  Right hallux valgus with first MTP arthritis  2.  Right second MTP arthritis with deformity of second metatarsal head and PIP/DIP deformity  3.  Left first MTP arthritis with hallux valgus status post fusion  4. probable right proximal fifth metatarsal avulsion fracture at peroneus brevis insertion and/or possible peroneus longus injury with mild retraction of the os peroneum  5.  left first metatarsal fracture proximal to previous fusion plate without displacement improved    We discussed treatment going forward overall seems to be doing well.  Sutures removed and Steri-Strips were applied.  Sterile dressings were placed and she was placed into a well-padded forefoot and ankle bunion hammertoe spica dressing    She will continue with elevation and nonweightbearing and anything worsens she will let me know otherwise I will see her back in 2 weeks x-rays of her right foot.

## 2025-06-05 ENCOUNTER — OFFICE VISIT (OUTPATIENT)
Dept: ORTHOPEDIC SURGERY | Facility: CLINIC | Age: 60
End: 2025-06-05
Payer: COMMERCIAL

## 2025-06-05 VITALS — WEIGHT: 138 LBS | TEMPERATURE: 96.9 F | HEIGHT: 68 IN | BODY MASS INDEX: 20.92 KG/M2

## 2025-06-05 DIAGNOSIS — M20.41 HAMMER TOE OF RIGHT FOOT: ICD-10-CM

## 2025-06-05 DIAGNOSIS — M19.071 ARTHRITIS OF FIRST METATARSOPHALANGEAL (MTP) JOINT OF RIGHT FOOT: ICD-10-CM

## 2025-06-05 DIAGNOSIS — M92.71 FREIBERG'S INFRACTION, RIGHT: ICD-10-CM

## 2025-06-05 DIAGNOSIS — M20.11 HALLUX VALGUS OF RIGHT FOOT: ICD-10-CM

## 2025-06-05 NOTE — PROGRESS NOTES
"Foot Follow Up      Patient: Cinthya Ayoub    YOB: 1965 59 y.o. female    Chief Complaints: Foot \"feels great\"    History of Present Illness: Patient underwent right foot hallux valgus correction and first MTP fusion without calcaneal bone graft as well as second MTP release with debridement of dorsal and plantar osteophytes with interpositional arthroplasty and PIP joint fusion on 5/6/2025.     Please see note from 2/3/2025 for details including previous left first MTP fusion, right proximal fifth metatarsal avulsion fracture with retraction of os peroneum and left first metatarsal fracture proximal to previous plate fixation     Patient was initially scheduled for follow-up appointment on 5/19/2025.  She notified me the night the night of 5/12/2025 of some increased swelling and feeling of warmth and tingling in her toes after being at work.  We spoke again on 5/13/2025 and she was having some persistent symptoms after elevating overnight was brought in for further evaluation.     Patient reported on 5/13/2025 that she was not having appreciable pain but had had an electric shock feeling over the anterior aspect of her foot and felt like it was just \"tight \".  She had been elevating and nonweightbearing and had not had any fevers or chills.  Pain was rated at 0 out of 10.     At that time her foot did not show any sign of infection and felt better after we changed her dressing.  There was no sign of any vascular compromise.  She was advised to continue with elevation and nonweightbearing and was switched to a square toe postoperative shoe.     Patient seen on 5/19/2025 reporting that her foot swelling had diminished and she had felt better after having her dressings changed at the previous visit.  She had been continuing to elevate and had remained nonweightbearing.  Pain was rated at 1 out of 10.    At that time her wounds were healing without sign of infection and toes were viable.  Sutures " "removed and Steri-Strips were applied she was placed into a well-padded forefoot and ankle bunion hammertoe spica dressing advised continued elevation and nonweightbearing.    Patient is seen back today reporting that her foot feels great her swelling has continued to diminish and she has been nonweightbearing and elevating.  On 5/31/2025 her wrapping had come off and I was not made aware of this but she rewrapped this several times and has remained nonweightbearing.  She reports 0 out of 10 pain  HPI    ROS: Foot pain  Past Medical History:   Diagnosis Date    Abnormal Pap smear of cervix     Arthritis     Breast cancer metastasized to axillary lymph node, right 03/07/2014    Bunion     RIGHT    Cervical dysplasia     S/P LEEP, continued abnl, TLH    Colon polyp     Foot fracture, right     Foot pain     Herpes     HSV 1 by blood    History of depression     Itchy skin     RIGHT ARM    Low back pain     Migraines     With aura    Osteoporosis     Peripheral neuropathy     Pinched nerve in neck     PONV (postoperative nausea and vomiting)     Sleep apnea treated with continuous positive airway pressure (CPAP)     UNABLE TO USE MACHINE    Thyroid cancer     h/o thyroid cancer    Urogenital trichomoniasis      Physical Exam:   Vitals:    06/05/25 0820   Temp: 96.9 °F (36.1 °C)   Weight: 62.6 kg (138 lb)   Height: 172.7 cm (68\")   PainSc: 0-No pain     Well developed with normal mood.  On exam her right foot incisions are healing without sign of infection swelling is diminishing.  There is good capillary refill to the toes.          Radiology: 3 views right foot ordered evaluate postoperative alignment reviewed and compared to previous x-rays these show good alignment of the first metatarsophalangeal joint fusion hardware is well aligned.  There is good alignment to the second MTP joint and PIP joint and second MTP joint appears congruent.      Assessment/Plan:  Status post right foot surgery as outlined above     1.  " Right hallux valgus with first MTP arthritis  2.  Right second MTP arthritis with deformity of second metatarsal head and PIP/DIP deformity  3.  Left first MTP arthritis with hallux valgus status post fusion  4. probable right proximal fifth metatarsal avulsion fracture at peroneus brevis insertion and/or possible peroneus longus injury with mild retraction of the os peroneum  5.  left first metatarsal fracture proximal to previous fusion plate without displacement improved    We discussed treatment going forward and overall seems to be doing well and pleased with her outcome and alignment.  Her pin was removed without difficulty and pin site was hemostatic.  Wounds were cleaned and she was placed back into a forefoot and ankle bunion hammertoe spica dressing.  She may do partial foot flat weightbearing with her postoperative shoe or boot but understands to keep this dry and elevate is much as possible.    Anything worsens she will let me know otherwise we will see her back in about 2 weeks with x-rays of her right foot.

## 2025-06-19 ENCOUNTER — OFFICE VISIT (OUTPATIENT)
Dept: ORTHOPEDIC SURGERY | Facility: CLINIC | Age: 60
End: 2025-06-19
Payer: COMMERCIAL

## 2025-06-19 VITALS — WEIGHT: 138 LBS | TEMPERATURE: 96.2 F | BODY MASS INDEX: 20.92 KG/M2 | HEIGHT: 68 IN

## 2025-06-19 DIAGNOSIS — R52 PAIN: ICD-10-CM

## 2025-06-19 DIAGNOSIS — M92.71 FREIBERG'S INFRACTION, RIGHT: ICD-10-CM

## 2025-06-19 DIAGNOSIS — M20.41 HAMMER TOE OF RIGHT FOOT: ICD-10-CM

## 2025-06-19 DIAGNOSIS — M19.071 ARTHRITIS OF FIRST METATARSOPHALANGEAL (MTP) JOINT OF RIGHT FOOT: Primary | ICD-10-CM

## 2025-06-19 DIAGNOSIS — M20.11 HALLUX VALGUS OF RIGHT FOOT: ICD-10-CM

## 2025-06-19 PROCEDURE — 99024 POSTOP FOLLOW-UP VISIT: CPT | Performed by: ORTHOPAEDIC SURGERY

## 2025-06-19 NOTE — PROGRESS NOTES
"Foot Follow Up      Patient: Cinthya Ayoub    YOB: 1965 59 y.o. female    Chief Complaints: Foot \"doing great\"    History of Present Illness:Patient underwent right foot hallux valgus correction and first MTP fusion without calcaneal bone graft as well as second MTP release with debridement of dorsal and plantar osteophytes with interpositional arthroplasty and PIP joint fusion on 5/6/2025.     Please see note from 2/3/2025 for details including previous left first MTP fusion, right proximal fifth metatarsal avulsion fracture with retraction of os peroneum and left first metatarsal fracture proximal to previous plate fixation     Patient was initially scheduled for follow-up appointment on 5/19/2025.  She notified me the night the night of 5/12/2025 of some increased swelling and feeling of warmth and tingling in her toes after being at work.  We spoke again on 5/13/2025 and she was having some persistent symptoms after elevating overnight was brought in for further evaluation.     Patient reported on 5/13/2025 that she was not having appreciable pain but had had an electric shock feeling over the anterior aspect of her foot and felt like it was just \"tight \".  She had been elevating and nonweightbearing and had not had any fevers or chills.  Pain was rated at 0 out of 10.     At that time her foot did not show any sign of infection and felt better after we changed her dressing.  There was no sign of any vascular compromise.  She was advised to continue with elevation and nonweightbearing and was switched to a square toe postoperative shoe.     Patient seen on 5/19/2025 reporting that her foot swelling had diminished and she had felt better after having her dressings changed at the previous visit.  She had been continuing to elevate and had remained nonweightbearing.  Pain was rated at 1 out of 10.     At that time her wounds were healing without sign of infection and toes were viable.  Sutures " "removed and Steri-Strips were applied she was placed into a well-padded forefoot and ankle bunion hammertoe spica dressing advised continued elevation and nonweightbearing.     Patient was seen on 6/5/2025 reporting that her foot felt great.  Her swelling had continued to diminish and she had been nonweightbearing and elevating.  On 5/31/2025 her wrapping had come off and I was not made aware of this but she rewrapped this several times and the pad remained nonweightbearing.  She reported 0 out of 10 pain.    At that time wound seem to be healing without sign of infection and overall completed her outcome.  Pin was removed without difficulty.  She was placed into a forefoot and ankle bunion hammertoe spica dressing.  She was allowed partial foot flat weightbearing with postoperative shoe    Patient is seen back today for injury placed in great \"thanks to me\" she had a partial weightbearing with crutches and postoperative shoe without appreciable pain in the right foot.  Pain is rated 0 out of 10  HPI    ROS: No foot pain  Past Medical History:   Diagnosis Date    Abnormal Pap smear of cervix     Arthritis     Breast cancer metastasized to axillary lymph node, right 03/07/2014    Bunion     RIGHT    Cervical dysplasia     S/P LEEP, continued abnl, TLH    Colon polyp     Foot fracture, right     Foot pain     Herpes     HSV 1 by blood    History of depression     Itchy skin     RIGHT ARM    Low back pain     Migraines     With aura    Osteoporosis     Peripheral neuropathy     Pinched nerve in neck     PONV (postoperative nausea and vomiting)     Sleep apnea treated with continuous positive airway pressure (CPAP)     UNABLE TO USE MACHINE    Thyroid cancer     h/o thyroid cancer    Urogenital trichomoniasis      Physical Exam:   Vitals:    06/19/25 0840   Temp: 96.2 °F (35.7 °C)   Weight: 62.6 kg (138 lb)   Height: 172.7 cm (68\")   PainSc: 0-No pain     Well developed with normal mood.  On exam her right foot wounds " are continuing to improve.  There is neutral alignment to her toes which demonstrated capillary refill.          Radiology: 3 views right foot ordered by postoperative alignment reviewed and compared to previous x-rays these show good alignment to the first metatarsal phalangeal joint fusion appears to be healing well hardware is intact.  There remains some distraction as expected of the second MTP joint without subluxation.      Status post right foot surgery as outlined above     1.  Right hallux valgus with first MTP arthritis  2.  Right second MTP arthritis with deformity of second metatarsal head and PIP/DIP deformity  3.  Left first MTP arthritis with hallux valgus status post fusion  4. probable right proximal fifth metatarsal avulsion fracture at peroneus brevis insertion and/or possible peroneus longus injury with mild retraction of the os peroneum  5.  left first metatarsal fracture proximal to previous fusion plate without displacement improved      We discussed treatment going forward and overall seems to be doing well and pleased with her outcome.  She may let this get wet in the shower but understands not to immerse and to stay out of her pool.  She may begin some gentle range of motion exercises to the second toe.  However continue with crutches and postoperative shoe doing partial weightbearing for the next 2 weeks and then start transitioning to a cane.    Anything worsens to let me know otherwise I will see her back in 3 weeks with x-rays of her right foot.

## 2025-07-10 ENCOUNTER — OFFICE VISIT (OUTPATIENT)
Dept: ORTHOPEDIC SURGERY | Facility: CLINIC | Age: 60
End: 2025-07-10
Payer: COMMERCIAL

## 2025-07-10 VITALS — BODY MASS INDEX: 20.92 KG/M2 | HEIGHT: 68 IN | TEMPERATURE: 98 F | WEIGHT: 138 LBS

## 2025-07-10 DIAGNOSIS — Z09 FOLLOW-UP EXAM: Primary | ICD-10-CM

## 2025-07-10 PROCEDURE — 99024 POSTOP FOLLOW-UP VISIT: CPT | Performed by: ORTHOPAEDIC SURGERY

## 2025-07-10 NOTE — PROGRESS NOTES
"Foot Follow Up      Patient: Cinthya Ayoub    YOB: 1965 59 y.o. female    Chief Complaints: Foot \"feels great\"    History of Present Illness:Patient underwent right foot hallux valgus correction and first MTP fusion without calcaneal bone graft as well as second MTP release with debridement of dorsal and plantar osteophytes with interpositional arthroplasty and PIP joint fusion on 5/6/2025.     Please see note from 2/3/2025 for details including previous left first MTP fusion, right proximal fifth metatarsal avulsion fracture with retraction of os peroneum and left first metatarsal fracture proximal to previous plate fixation     Patient was initially scheduled for follow-up appointment on 5/19/2025.  She notified me the night the night of 5/12/2025 of some increased swelling and feeling of warmth and tingling in her toes after being at work.  We spoke again on 5/13/2025 and she was having some persistent symptoms after elevating overnight was brought in for further evaluation.     Patient reported on 5/13/2025 that she was not having appreciable pain but had had an electric shock feeling over the anterior aspect of her foot and felt like it was just \"tight \".  She had been elevating and nonweightbearing and had not had any fevers or chills.  Pain was rated at 0 out of 10.     At that time her foot did not show any sign of infection and felt better after we changed her dressing.  There was no sign of any vascular compromise.  She was advised to continue with elevation and nonweightbearing and was switched to a square toe postoperative shoe.     Patient seen on 5/19/2025 reporting that her foot swelling had diminished and she had felt better after having her dressings changed at the previous visit.  She had been continuing to elevate and had remained nonweightbearing.  Pain was rated at 1 out of 10.     At that time her wounds were healing without sign of infection and toes were viable.  Sutures " "removed and Steri-Strips were applied she was placed into a well-padded forefoot and ankle bunion hammertoe spica dressing advised continued elevation and nonweightbearing.     Patient was seen on 6/5/2025 reporting that her foot felt great.  Her swelling had continued to diminish and she had been nonweightbearing and elevating.  On 5/31/2025 her wrapping had come off and I was not made aware of this but she rewrapped this several times and the pad remained nonweightbearing.  She reported 0 out of 10 pain.     At that time wound seem to be healing without sign of infection and overall completed her outcome.  Pin was removed without difficulty.  She was placed into a forefoot and ankle bunion hammertoe spica dressing.  She was allowed partial foot flat weightbearing with postoperative shoe     Patient was seen on 6/19/2025 reporting that her foot was great \"thanks to me\".  She had been partial weightbearing with crutches and postoperative shoe without appreciable pain in the right foot.  Pain was rated 0 out of 10.  Pain at that time seem to be doing well and pleased with the outcome.  She was allowed to let this get wet in the shower but not immerse or get into her pool.  She was advised on gentle range of motion exercises to the second toe and is advised to continue with crutches and postoperative shoe doing partial weightbearing for the next 2 weeks and then can transition to a cane.    Patient is seen back today reporting that her foot feels great.  She been using her boot she is at the office and postoperative shoe around the house she intermittently uses a crutch.  She has gone occasionally around the house and a stiff soled shoe without appreciable pain.  She has gotten in her pool some but keeping it elevated on the raft.  Pain is rated at 0 out of 10  HPI    ROS: No foot pain  Past Medical History:   Diagnosis Date    Abnormal Pap smear of cervix     Arthritis     Breast cancer metastasized to axillary lymph " "node, right 03/07/2014    Bunion     RIGHT    Cervical dysplasia     S/P LEEP, continued abnl, TLH    Colon polyp     Foot fracture, right     Foot pain     Herpes     HSV 1 by blood    History of depression     Itchy skin     RIGHT ARM    Low back pain     Migraines     With aura    Osteoporosis     Peripheral neuropathy     Pinched nerve in neck     PONV (postoperative nausea and vomiting)     Sleep apnea treated with continuous positive airway pressure (CPAP)     UNABLE TO USE MACHINE    Thyroid cancer     h/o thyroid cancer    Urogenital trichomoniasis      Physical Exam:   Vitals:    07/10/25 1518   Temp: 98 °F (36.7 °C)   Weight: 62.6 kg (138 lb)   Height: 172.7 cm (68\")   PainSc: 0-No pain     Well developed with normal mood.  Right foot incisions are well-healed there is neutral llama to the 1st and 2nd toes there is some stiffness to the second MTP joint as expected but no pain with range of motion.  There is neutral alignment.          Radiology: 3 views right foot ordered evaluate postoperative alignment reviewed and compared to previous x-rays these show good alignment of the first metatarsal phalangeal joint fusion and still has decent alignment to the second MTP joint with obvious clear residual arthritic changes.  There is good alignment of the second PIP joint but does not appear to be fused.      Assessment/Plan:  Status post right foot surgery as outlined above     1.  Right hallux valgus with first MTP arthritis  2.  Right second MTP arthritis with deformity of second metatarsal head and PIP/DIP deformity  3.  Left first MTP arthritis with hallux valgus status post fusion  4. probable right proximal fifth metatarsal avulsion fracture at peroneus brevis insertion and/or possible peroneus longus injury with mild retraction of the os peroneum  5.  left first metatarsal fracture proximal to previous fusion plate without displacement improved    We discussed treatment going forward and overall she " seems very pleased with her outcome.    I will start weaning off of her crutches over the next week to 10 days and continue with boot into a postoperative shoe during that time for another week or so and if doing well may then start weaning out of those initially around the house out of the house but use a stiff soled shoe while doing as such    Anything worsens shortly know otherwise I will see her back in 4 weeks x-rays of her right foot.

## 2025-08-13 ENCOUNTER — TRANSCRIBE ORDERS (OUTPATIENT)
Dept: ADMINISTRATIVE | Facility: HOSPITAL | Age: 60
End: 2025-08-13
Payer: COMMERCIAL

## 2025-08-13 ENCOUNTER — OFFICE VISIT (OUTPATIENT)
Dept: ORTHOPEDIC SURGERY | Facility: CLINIC | Age: 60
End: 2025-08-13
Payer: COMMERCIAL

## 2025-08-13 VITALS — HEIGHT: 68 IN | TEMPERATURE: 97.1 F | BODY MASS INDEX: 20.92 KG/M2 | WEIGHT: 138 LBS

## 2025-08-13 DIAGNOSIS — R52 PAIN: ICD-10-CM

## 2025-08-13 DIAGNOSIS — T85.49XA BREAST IMPLANT DEFLATION, INITIAL ENCOUNTER: Primary | ICD-10-CM

## 2025-08-13 DIAGNOSIS — M20.41 HAMMER TOE OF RIGHT FOOT: ICD-10-CM

## 2025-08-13 DIAGNOSIS — M20.11 HALLUX VALGUS OF RIGHT FOOT: ICD-10-CM

## 2025-08-13 DIAGNOSIS — M92.71 FREIBERG'S INFRACTION, RIGHT: ICD-10-CM

## 2025-08-13 DIAGNOSIS — Z85.3 PERSONAL HISTORY OF BREAST CANCER: ICD-10-CM

## 2025-08-13 DIAGNOSIS — M19.071 ARTHRITIS OF FIRST METATARSOPHALANGEAL (MTP) JOINT OF RIGHT FOOT: Primary | ICD-10-CM

## 2025-08-13 PROCEDURE — 99213 OFFICE O/P EST LOW 20 MIN: CPT | Performed by: ORTHOPAEDIC SURGERY

## (undated) DEVICE — SKIN PREP TRAY W/CHG: Brand: MEDLINE INDUSTRIES, INC.

## (undated) DEVICE — REAMR PHALANGL 18MM

## (undated) DEVICE — ANTIBACTERIAL UNDYED BRAIDED (POLYGLACTIN 910), SYNTHETIC ABSORBABLE SUTURE: Brand: COATED VICRYL

## (undated) DEVICE — APPL CHLORAPREP HI/LITE 26ML ORNG

## (undated) DEVICE — REAMR PHALANGL 22MM

## (undated) DEVICE — SUT ETHLN 4/0 PS2 18IN 1667H

## (undated) DEVICE — REAMR METETRSL 20MM

## (undated) DEVICE — Device

## (undated) DEVICE — Device: Brand: DEFENDO AIR/WATER/SUCTION AND BIOPSY VALVE

## (undated) DEVICE — WEBRIL* CAST PADDING: Brand: DEROYAL

## (undated) DEVICE — DRP C/ARM 41X74IN

## (undated) DEVICE — BNDG GZ SOF-FORM CONFRM 2X75IN LF STRL

## (undated) DEVICE — VIAL FORMALIN CAP 10P 40ML

## (undated) DEVICE — REAMR METATRSL 22MM

## (undated) DEVICE — UNDERCAST PADDING: Brand: DEROYAL

## (undated) DEVICE — GLV SURG BIOGEL LTX PF 8

## (undated) DEVICE — SUT VIC 4/0 SH 27IN J415H

## (undated) DEVICE — TRAP FLD MINIVAC MEGADYNE 100ML

## (undated) DEVICE — DRESSING,GAUZE,XEROFORM,CURAD,1"X8",ST: Brand: CURAD

## (undated) DEVICE — BLANKT WARM UPPR/BDY ARM/OUT 57X196CM

## (undated) DEVICE — PREP SOL POVIDONE/IODINE BT 4OZ

## (undated) DEVICE — PK ORTHO MINOR TOWER 40

## (undated) DEVICE — SUCTION CANISTER, 3000CC,SAFELINER: Brand: DEROYAL

## (undated) DEVICE — IMPLANTABLE DEVICE
Type: IMPLANTABLE DEVICE | Site: TOES | Status: NON-FUNCTIONAL
Removed: 2024-01-16

## (undated) DEVICE — INTENDED FOR TISSUE SEPARATION, AND OTHER PROCEDURES THAT REQUIRE A SHARP SURGICAL BLADE TO PUNCTURE OR CUT.: Brand: BARD-PARKER ® CARBON RIB-BACK BLADES

## (undated) DEVICE — DRP C/ARMOR

## (undated) DEVICE — SHOE P/OP/CAST O/T FML MD 6/8

## (undated) DEVICE — GOWN,NON-REINFORCED,SIRUS,SET IN SLV,XXL: Brand: MEDLINE

## (undated) DEVICE — PATIENT RETURN ELECTRODE, SINGLE-USE, CONTACT QUALITY MONITORING, ADULT, WITH 9FT CORD, FOR PATIENTS WEIGING OVER 33LBS. (15KG): Brand: MEGADYNE

## (undated) DEVICE — DRAPE,U/ SHT,SPLIT,PLAS,STERIL: Brand: MEDLINE

## (undated) DEVICE — ELECTRD NDL EZ CLN MOD 2.75IN

## (undated) DEVICE — 3M™ IOBAN™ 2 ANTIMICROBIAL INCISE DRAPE 6650EZ: Brand: IOBAN™ 2

## (undated) DEVICE — REAMR PHALANGL 20MM

## (undated) DEVICE — SUT VIC 3/0 SH 27IN J416H

## (undated) DEVICE — IMPLANTABLE DEVICE
Type: IMPLANTABLE DEVICE | Site: TOES | Status: NON-FUNCTIONAL
Removed: 2025-05-06

## (undated) DEVICE — GLV SURG SIGNATURE ESSENTIAL PF LTX SZ8

## (undated) DEVICE — COVER,C-ARM,41X74: Brand: MEDLINE

## (undated) DEVICE — BLD SCLPL BEAVR MINI STR 2BVL 180D LF

## (undated) DEVICE — SYS PERFUS SEP PLATLT W TIPS CUST

## (undated) DEVICE — GOWN ISOL W/THUMB UNIV BLU BX/15

## (undated) DEVICE — GOWN,SIRUS,NONRNF,SETINSLV,2XL,18/CS: Brand: MEDLINE

## (undated) DEVICE — PRECISION THIN (9.0 X 0.38 X 25.0MM)

## (undated) DEVICE — FRCP BX RADJAW4 NDL 2.8 240CM LG OG BX40

## (undated) DEVICE — BNDG ELAS CO-FLEX SLF ADHR 2IN 5YD LF STRL

## (undated) DEVICE — PENCL SMOKE/EVAC MEGADYNE TELESCP 10FT

## (undated) DEVICE — DISPOSABLE TOURNIQUET CUFF SINGLE BLADDER, SINGLE PORT AND QUICK CONNECT CONNECTOR: Brand: COLOR CUFF

## (undated) DEVICE — BIT DRL FOR USE W LOCK SCRW3MM 2.2MM

## (undated) DEVICE — HYBRID TUBING/CAP SET FOR OLYMPUS® SCOPES: Brand: ERBE

## (undated) DEVICE — PIN BB TAK MTP

## (undated) DEVICE — ENDO. PORT CONNECTOR W/VALVE FOR OLYMPUS® SCOPES: Brand: ERBE

## (undated) DEVICE — SUT ETHLN 3/0 PC5 18IN 1893G

## (undated) DEVICE — BW-412T DISP COMBO CLEANING BRUSH: Brand: SINGLE USE COMBINATION CLEANING BRUSH

## (undated) DEVICE — SYR LL 3CC

## (undated) DEVICE — JACKT LAB KNIT COLR LG BLU

## (undated) DEVICE — TBG PENCL TELESCP MEGADYNE SMOKE EVAC 10FT

## (undated) DEVICE — SPNG GZ WOVN 4X4IN 12PLY 10/BX STRL

## (undated) DEVICE — MASK,FACE,FLUID RESIST,SHLD,EARLOOP: Brand: MEDLINE